# Patient Record
Sex: MALE | Race: WHITE | HISPANIC OR LATINO | Employment: OTHER | URBAN - METROPOLITAN AREA
[De-identification: names, ages, dates, MRNs, and addresses within clinical notes are randomized per-mention and may not be internally consistent; named-entity substitution may affect disease eponyms.]

---

## 2021-12-21 ENCOUNTER — HOSPITAL ENCOUNTER (OUTPATIENT)
Facility: HOSPITAL | Age: 36
Discharge: HOME | End: 2021-12-23
Attending: FAMILY MEDICINE | Admitting: INTERNAL MEDICINE
Payer: MEDICAID

## 2021-12-21 DIAGNOSIS — I10 ACCELERATED HYPERTENSION: ICD-10-CM

## 2021-12-21 DIAGNOSIS — I21.4 NSTEMI (NON-ST ELEVATED MYOCARDIAL INFARCTION): ICD-10-CM

## 2021-12-21 DIAGNOSIS — R79.89 ELEVATED TROPONIN: Primary | ICD-10-CM

## 2021-12-21 DIAGNOSIS — I10 HTN (HYPERTENSION): ICD-10-CM

## 2021-12-21 LAB
ALBUMIN SERPL BCP-MCNC: 4.5 G/DL (ref 3.5–5.2)
ALP SERPL-CCNC: 94 U/L (ref 55–135)
ALT SERPL W/O P-5'-P-CCNC: 40 U/L (ref 10–44)
ANION GAP SERPL CALC-SCNC: 11 MMOL/L (ref 8–16)
AST SERPL-CCNC: 44 U/L (ref 10–40)
BASOPHILS # BLD AUTO: 0.04 K/UL (ref 0–0.2)
BASOPHILS NFR BLD: 0.4 % (ref 0–1.9)
BILIRUB SERPL-MCNC: 0.4 MG/DL (ref 0.1–1)
BUN SERPL-MCNC: 14 MG/DL (ref 6–20)
CALCIUM SERPL-MCNC: 9 MG/DL (ref 8.7–10.5)
CHLORIDE SERPL-SCNC: 102 MMOL/L (ref 95–110)
CO2 SERPL-SCNC: 24 MMOL/L (ref 23–29)
CREAT SERPL-MCNC: 0.9 MG/DL (ref 0.5–1.4)
CTP QC/QA: YES
CTP QC/QA: YES
DIFFERENTIAL METHOD: ABNORMAL
EOSINOPHIL # BLD AUTO: 0.1 K/UL (ref 0–0.5)
EOSINOPHIL NFR BLD: 1 % (ref 0–8)
ERYTHROCYTE [DISTWIDTH] IN BLOOD BY AUTOMATED COUNT: 13.1 % (ref 11.5–14.5)
EST. GFR  (AFRICAN AMERICAN): >60 ML/MIN/1.73 M^2
EST. GFR  (NON AFRICAN AMERICAN): >60 ML/MIN/1.73 M^2
GLUCOSE SERPL-MCNC: 91 MG/DL (ref 70–110)
GROUP A STREP, MOLECULAR: NEGATIVE
HCT VFR BLD AUTO: 47.6 % (ref 40–54)
HGB BLD-MCNC: 16.3 G/DL (ref 14–18)
IMM GRANULOCYTES # BLD AUTO: 0.05 K/UL (ref 0–0.04)
IMM GRANULOCYTES NFR BLD AUTO: 0.5 % (ref 0–0.5)
LYMPHOCYTES # BLD AUTO: 2.1 K/UL (ref 1–4.8)
LYMPHOCYTES NFR BLD: 19.1 % (ref 18–48)
MCH RBC QN AUTO: 29.8 PG (ref 27–31)
MCHC RBC AUTO-ENTMCNC: 34.2 G/DL (ref 32–36)
MCV RBC AUTO: 87 FL (ref 82–98)
MONOCYTES # BLD AUTO: 0.9 K/UL (ref 0.3–1)
MONOCYTES NFR BLD: 8.4 % (ref 4–15)
NEUTROPHILS # BLD AUTO: 7.8 K/UL (ref 1.8–7.7)
NEUTROPHILS NFR BLD: 70.6 % (ref 38–73)
NRBC BLD-RTO: 0 /100 WBC
PLATELET # BLD AUTO: 271 K/UL (ref 150–450)
PMV BLD AUTO: 9.8 FL (ref 9.2–12.9)
POC MOLECULAR INFLUENZA A AGN: NEGATIVE
POC MOLECULAR INFLUENZA B AGN: NEGATIVE
POTASSIUM SERPL-SCNC: 4 MMOL/L (ref 3.5–5.1)
PROT SERPL-MCNC: 8.2 G/DL (ref 6–8.4)
RBC # BLD AUTO: 5.47 M/UL (ref 4.6–6.2)
SARS-COV-2 RDRP RESP QL NAA+PROBE: NEGATIVE
SODIUM SERPL-SCNC: 137 MMOL/L (ref 136–145)
TROPONIN I SERPL DL<=0.01 NG/ML-MCNC: 0.05 NG/ML (ref 0–0.03)
WBC # BLD AUTO: 11.08 K/UL (ref 3.9–12.7)

## 2021-12-21 PROCEDURE — 99291 CRITICAL CARE FIRST HOUR: CPT | Mod: 25

## 2021-12-21 PROCEDURE — 84484 ASSAY OF TROPONIN QUANT: CPT | Performed by: FAMILY MEDICINE

## 2021-12-21 PROCEDURE — G0378 HOSPITAL OBSERVATION PER HR: HCPCS

## 2021-12-21 PROCEDURE — 93010 EKG 12-LEAD: ICD-10-PCS | Mod: ,,, | Performed by: INTERNAL MEDICINE

## 2021-12-21 PROCEDURE — 25000003 PHARM REV CODE 250: Performed by: NURSE PRACTITIONER

## 2021-12-21 PROCEDURE — U0002 COVID-19 LAB TEST NON-CDC: HCPCS | Performed by: FAMILY MEDICINE

## 2021-12-21 PROCEDURE — 87651 STREP A DNA AMP PROBE: CPT | Performed by: FAMILY MEDICINE

## 2021-12-21 PROCEDURE — 93005 ELECTROCARDIOGRAM TRACING: CPT

## 2021-12-21 PROCEDURE — 25000003 PHARM REV CODE 250: Performed by: FAMILY MEDICINE

## 2021-12-21 PROCEDURE — 80053 COMPREHEN METABOLIC PANEL: CPT | Performed by: FAMILY MEDICINE

## 2021-12-21 PROCEDURE — 93010 ELECTROCARDIOGRAM REPORT: CPT | Mod: ,,, | Performed by: INTERNAL MEDICINE

## 2021-12-21 PROCEDURE — 85025 COMPLETE CBC W/AUTO DIFF WBC: CPT | Performed by: FAMILY MEDICINE

## 2021-12-21 RX ORDER — ASPIRIN 81 MG/1
81 TABLET ORAL DAILY
Status: DISCONTINUED | OUTPATIENT
Start: 2021-12-22 | End: 2021-12-23 | Stop reason: HOSPADM

## 2021-12-21 RX ORDER — ACETAMINOPHEN 325 MG/1
650 TABLET ORAL EVERY 6 HOURS PRN
Status: DISCONTINUED | OUTPATIENT
Start: 2021-12-21 | End: 2021-12-23 | Stop reason: HOSPADM

## 2021-12-21 RX ORDER — ONDANSETRON 4 MG/1
4 TABLET, ORALLY DISINTEGRATING ORAL EVERY 6 HOURS PRN
Status: DISCONTINUED | OUTPATIENT
Start: 2021-12-21 | End: 2021-12-23 | Stop reason: HOSPADM

## 2021-12-21 RX ORDER — DIPHENHYDRAMINE HCL 25 MG
25 CAPSULE ORAL EVERY 6 HOURS PRN
Status: DISCONTINUED | OUTPATIENT
Start: 2021-12-21 | End: 2021-12-23 | Stop reason: HOSPADM

## 2021-12-21 RX ORDER — ENOXAPARIN SODIUM 100 MG/ML
40 INJECTION SUBCUTANEOUS EVERY 24 HOURS
Status: DISCONTINUED | OUTPATIENT
Start: 2021-12-22 | End: 2021-12-23 | Stop reason: HOSPADM

## 2021-12-21 RX ORDER — CLONIDINE HYDROCHLORIDE 0.2 MG/1
0.2 TABLET ORAL
Status: COMPLETED | OUTPATIENT
Start: 2021-12-21 | End: 2021-12-21

## 2021-12-21 RX ORDER — ASPIRIN 325 MG
325 TABLET, DELAYED RELEASE (ENTERIC COATED) ORAL ONCE
Status: COMPLETED | OUTPATIENT
Start: 2021-12-21 | End: 2021-12-21

## 2021-12-21 RX ORDER — NITROGLYCERIN 0.4 MG/1
0.4 TABLET SUBLINGUAL EVERY 5 MIN PRN
Status: DISCONTINUED | OUTPATIENT
Start: 2021-12-21 | End: 2021-12-23 | Stop reason: HOSPADM

## 2021-12-21 RX ORDER — CLONIDINE HYDROCHLORIDE 0.1 MG/1
0.1 TABLET ORAL EVERY 6 HOURS PRN
Status: DISCONTINUED | OUTPATIENT
Start: 2021-12-21 | End: 2021-12-23 | Stop reason: HOSPADM

## 2021-12-21 RX ADMIN — CLONIDINE HYDROCHLORIDE 0.1 MG: 0.1 TABLET ORAL at 11:12

## 2021-12-21 RX ADMIN — NITROGLYCERIN 1 INCH: 20 OINTMENT TOPICAL at 09:12

## 2021-12-21 RX ADMIN — ASPIRIN 325 MG: 325 TABLET, COATED ORAL at 11:12

## 2021-12-21 RX ADMIN — CLONIDINE HYDROCHLORIDE 0.2 MG: 0.2 TABLET ORAL at 08:12

## 2021-12-22 LAB
CHOLEST SERPL-MCNC: 204 MG/DL (ref 120–199)
CHOLEST/HDLC SERPL: 5.5 {RATIO} (ref 2–5)
HDLC SERPL-MCNC: 37 MG/DL (ref 40–75)
HDLC SERPL: 18.1 % (ref 20–50)
LDLC SERPL CALC-MCNC: 148.8 MG/DL (ref 63–159)
NONHDLC SERPL-MCNC: 167 MG/DL
TRIGL SERPL-MCNC: 91 MG/DL (ref 30–150)
TROPONIN I SERPL DL<=0.01 NG/ML-MCNC: 0.04 NG/ML (ref 0–0.03)
TROPONIN I SERPL DL<=0.01 NG/ML-MCNC: 0.04 NG/ML (ref 0–0.03)

## 2021-12-22 PROCEDURE — 80061 LIPID PANEL: CPT | Performed by: NURSE PRACTITIONER

## 2021-12-22 PROCEDURE — G0378 HOSPITAL OBSERVATION PER HR: HCPCS

## 2021-12-22 PROCEDURE — 36415 COLL VENOUS BLD VENIPUNCTURE: CPT | Performed by: NURSE PRACTITIONER

## 2021-12-22 PROCEDURE — 25000003 PHARM REV CODE 250: Performed by: INTERNAL MEDICINE

## 2021-12-22 PROCEDURE — 63600175 PHARM REV CODE 636 W HCPCS: Performed by: NURSE PRACTITIONER

## 2021-12-22 PROCEDURE — 25000003 PHARM REV CODE 250: Performed by: NURSE PRACTITIONER

## 2021-12-22 PROCEDURE — 84484 ASSAY OF TROPONIN QUANT: CPT | Mod: 91 | Performed by: NURSE PRACTITIONER

## 2021-12-22 PROCEDURE — 99220 PR INITIAL OBSERVATION CARE,LEVL III: ICD-10-PCS | Mod: ,,, | Performed by: INTERNAL MEDICINE

## 2021-12-22 PROCEDURE — 99220 PR INITIAL OBSERVATION CARE,LEVL III: CPT | Mod: ,,, | Performed by: INTERNAL MEDICINE

## 2021-12-22 PROCEDURE — 96372 THER/PROPH/DIAG INJ SC/IM: CPT

## 2021-12-22 RX ORDER — HYDROCHLOROTHIAZIDE 25 MG/1
25 TABLET ORAL DAILY
Status: DISCONTINUED | OUTPATIENT
Start: 2021-12-22 | End: 2021-12-23 | Stop reason: HOSPADM

## 2021-12-22 RX ORDER — LOSARTAN POTASSIUM 50 MG/1
50 TABLET ORAL DAILY
Status: DISCONTINUED | OUTPATIENT
Start: 2021-12-22 | End: 2021-12-23 | Stop reason: HOSPADM

## 2021-12-22 RX ORDER — AMLODIPINE BESYLATE 10 MG/1
10 TABLET ORAL DAILY
Status: DISCONTINUED | OUTPATIENT
Start: 2021-12-22 | End: 2021-12-23 | Stop reason: HOSPADM

## 2021-12-22 RX ORDER — AMLODIPINE BESYLATE 10 MG/1
10 TABLET ORAL DAILY
Status: DISCONTINUED | OUTPATIENT
Start: 2021-12-22 | End: 2021-12-22

## 2021-12-22 RX ADMIN — ENOXAPARIN SODIUM 40 MG: 40 INJECTION SUBCUTANEOUS at 05:12

## 2021-12-22 RX ADMIN — ACETAMINOPHEN 650 MG: 325 TABLET ORAL at 07:12

## 2021-12-22 RX ADMIN — ASPIRIN 81 MG: 81 TABLET, COATED ORAL at 09:12

## 2021-12-22 RX ADMIN — LOSARTAN POTASSIUM 50 MG: 50 TABLET, FILM COATED ORAL at 06:12

## 2021-12-22 RX ADMIN — HYDROCHLOROTHIAZIDE 25 MG: 25 TABLET ORAL at 06:12

## 2021-12-22 RX ADMIN — ACETAMINOPHEN 650 MG: 325 TABLET ORAL at 02:12

## 2021-12-22 RX ADMIN — AMLODIPINE BESYLATE 10 MG: 10 TABLET ORAL at 05:12

## 2021-12-23 VITALS
WEIGHT: 216 LBS | BODY MASS INDEX: 35.99 KG/M2 | TEMPERATURE: 98 F | HEIGHT: 65 IN | SYSTOLIC BLOOD PRESSURE: 164 MMHG | RESPIRATION RATE: 20 BRPM | DIASTOLIC BLOOD PRESSURE: 95 MMHG | OXYGEN SATURATION: 95 % | HEART RATE: 72 BPM

## 2021-12-23 PROBLEM — I10 ACCELERATED HYPERTENSION: Status: RESOLVED | Noted: 2021-12-21 | Resolved: 2021-12-23

## 2021-12-23 PROBLEM — R79.89 ELEVATED TROPONIN: Status: RESOLVED | Noted: 2021-12-21 | Resolved: 2021-12-23

## 2021-12-23 LAB
AORTIC ROOT ANNULUS: 4.12 CM
ASCENDING AORTA: 3.91 CM
AV INDEX (PROSTH): 0.65
AV MEAN GRADIENT: 9 MMHG
AV PEAK GRADIENT: 15 MMHG
AV REGURGITATION PRESSURE HALF TIME: 659.11 MS
AV VALVE AREA: 2.37 CM2
AV VELOCITY RATIO: 0.6
BSA FOR ECHO PROCEDURE: 2.12 M2
CV ECHO LV RWT: 0.64 CM
DOP CALC AO PEAK VEL: 1.94 M/S
DOP CALC AO VTI: 33.8 CM
DOP CALC LVOT AREA: 3.6 CM2
DOP CALC LVOT DIAMETER: 2.15 CM
DOP CALC LVOT PEAK VEL: 1.17 M/S
DOP CALC LVOT STROKE VOLUME: 80.19 CM3
DOP CALC RVOT PEAK VEL: 0.93 M/S
DOP CALC RVOT VTI: 18.5 CM
DOP CALCLVOT PEAK VEL VTI: 22.1 CM
E WAVE DECELERATION TIME: 262.59 MSEC
E/A RATIO: 1.1
E/E' RATIO: 15.08 M/S
ECHO EF ESTIMATED: 31 %
ECHO LV POSTERIOR WALL: 1.71 CM (ref 0.6–1.1)
EJECTION FRACTION: 50 %
FRACTIONAL SHORTENING: 15 % (ref 28–44)
INTERVENTRICULAR SEPTUM: 1.57 CM (ref 0.6–1.1)
IVC DIAMETER: 1.81 CM
IVRT: 94.2 MSEC
LA MAJOR: 5.97 CM
LA MINOR: 3.67 CM
LA WIDTH: 3.26 CM
LEFT ATRIUM SIZE: 4.2 CM
LEFT ATRIUM VOLUME INDEX: 25.9 ML/M2
LEFT ATRIUM VOLUME: 52.9 CM3
LEFT INTERNAL DIMENSION IN SYSTOLE: 4.56 CM (ref 2.1–4)
LEFT VENTRICLE DIASTOLIC VOLUME INDEX: 68.15 ML/M2
LEFT VENTRICLE DIASTOLIC VOLUME: 139.03 ML
LEFT VENTRICLE MASS INDEX: 201 G/M2
LEFT VENTRICLE SYSTOLIC VOLUME INDEX: 46.9 ML/M2
LEFT VENTRICLE SYSTOLIC VOLUME: 95.59 ML
LEFT VENTRICULAR INTERNAL DIMENSION IN DIASTOLE: 5.36 CM (ref 3.5–6)
LEFT VENTRICULAR MASS: 409.1 G
LV LATERAL E/E' RATIO: 14 M/S
LV SEPTAL E/E' RATIO: 16.33 M/S
LVOT MG: 3.17 MMHG
LVOT MV: 0.85 CM/S
MV PEAK A VEL: 0.89 M/S
MV PEAK E VEL: 0.98 M/S
MV STENOSIS PRESSURE HALF TIME: 76.15 MS
MV VALVE AREA P 1/2 METHOD: 2.89 CM2
PISA AR MAX VEL: 5.95 M/S
PISA TR MAX VEL: 3.53 M/S
PV MEAN GRADIENT: 1.84 MMHG
RA MAJOR: 4.12 CM
RA PRESSURE: 3 MMHG
RA WIDTH: 3 CM
SINUS: 4.26 CM
STJ: 3.87 CM
TDI LATERAL: 0.07 M/S
TDI SEPTAL: 0.06 M/S
TDI: 0.07 M/S
TR MAX PG: 50 MMHG
TRICUSPID ANNULAR PLANE SYSTOLIC EXCURSION: 3.01 CM
TV REST PULMONARY ARTERY PRESSURE: 53 MMHG

## 2021-12-23 PROCEDURE — 25000003 PHARM REV CODE 250: Performed by: INTERNAL MEDICINE

## 2021-12-23 PROCEDURE — 99224 PR SUBSEQUENT OBSERVATION CARE,LEVEL I: ICD-10-PCS | Mod: 25,,, | Performed by: INTERNAL MEDICINE

## 2021-12-23 PROCEDURE — 99224 PR SUBSEQUENT OBSERVATION CARE,LEVEL I: CPT | Mod: 25,,, | Performed by: INTERNAL MEDICINE

## 2021-12-23 PROCEDURE — 25000003 PHARM REV CODE 250: Performed by: NURSE PRACTITIONER

## 2021-12-23 PROCEDURE — G0378 HOSPITAL OBSERVATION PER HR: HCPCS

## 2021-12-23 RX ORDER — AMLODIPINE BESYLATE 10 MG/1
10 TABLET ORAL DAILY
Qty: 30 TABLET | Refills: 1 | Status: SHIPPED | OUTPATIENT
Start: 2021-12-24 | End: 2022-12-24

## 2021-12-23 RX ORDER — LOSARTAN POTASSIUM 50 MG/1
100 TABLET ORAL DAILY
Qty: 30 TABLET | Refills: 1 | Status: SHIPPED | OUTPATIENT
Start: 2021-12-24 | End: 2022-12-24

## 2021-12-23 RX ORDER — ASPIRIN 81 MG/1
81 TABLET ORAL DAILY
Qty: 30 TABLET | Refills: 1 | Status: SHIPPED | OUTPATIENT
Start: 2021-12-24 | End: 2022-12-24

## 2021-12-23 RX ORDER — HYDROCHLOROTHIAZIDE 25 MG/1
25 TABLET ORAL DAILY
Qty: 30 TABLET | Refills: 1 | Status: SHIPPED | OUTPATIENT
Start: 2021-12-24 | End: 2022-12-24

## 2021-12-23 RX ADMIN — LOSARTAN POTASSIUM 50 MG: 50 TABLET, FILM COATED ORAL at 09:12

## 2021-12-23 RX ADMIN — AMLODIPINE BESYLATE 10 MG: 10 TABLET ORAL at 09:12

## 2021-12-23 RX ADMIN — HYDROCHLOROTHIAZIDE 25 MG: 25 TABLET ORAL at 09:12

## 2021-12-23 RX ADMIN — ACETAMINOPHEN 650 MG: 325 TABLET ORAL at 06:12

## 2021-12-23 RX ADMIN — ASPIRIN 81 MG: 81 TABLET, COATED ORAL at 09:12

## 2022-01-26 ENCOUNTER — TELEPHONE (OUTPATIENT)
Dept: CARDIOLOGY | Facility: CLINIC | Age: 37
End: 2022-01-26

## 2022-01-26 NOTE — TELEPHONE ENCOUNTER
I attempted to contact the patient and left a voicemail for him to call back to reschedule his appointment.

## 2025-05-27 ENCOUNTER — HOSPITAL ENCOUNTER (INPATIENT)
Facility: HOSPITAL | Age: 40
LOS: 3 days | Discharge: HOME | DRG: 286 | End: 2025-05-30
Attending: EMERGENCY MEDICINE | Admitting: HOSPITALIST
Payer: MEDICAID

## 2025-05-27 ENCOUNTER — DOCUMENTATION ONLY (OUTPATIENT)
Dept: CARDIOLOGY | Facility: CLINIC | Age: 40
End: 2025-05-27
Payer: MEDICAID

## 2025-05-27 DIAGNOSIS — I16.0 HYPERTENSIVE URGENCY: ICD-10-CM

## 2025-05-27 DIAGNOSIS — I10 HYPERTENSION, UNSPECIFIED TYPE: ICD-10-CM

## 2025-05-27 DIAGNOSIS — I50.43 ACUTE ON CHRONIC COMBINED SYSTOLIC AND DIASTOLIC CONGESTIVE HEART FAILURE: ICD-10-CM

## 2025-05-27 DIAGNOSIS — R79.89 ELEVATED TROPONIN: ICD-10-CM

## 2025-05-27 DIAGNOSIS — I50.9 ACUTE ON CHRONIC CONGESTIVE HEART FAILURE, UNSPECIFIED HEART FAILURE TYPE: Primary | ICD-10-CM

## 2025-05-27 DIAGNOSIS — R06.02 SHORTNESS OF BREATH: ICD-10-CM

## 2025-05-27 DIAGNOSIS — E87.6 HYPOKALEMIA: ICD-10-CM

## 2025-05-27 DIAGNOSIS — R94.39 ABNORMAL STRESS TEST: ICD-10-CM

## 2025-05-27 DIAGNOSIS — I50.9 CHF EXACERBATION: ICD-10-CM

## 2025-05-27 LAB
ABSOLUTE EOSINOPHIL (OHS): 0.37 K/UL
ABSOLUTE MONOCYTE (OHS): 0.76 K/UL (ref 0.3–1)
ABSOLUTE NEUTROPHIL COUNT (OHS): 6.07 K/UL (ref 1.8–7.7)
ALBUMIN SERPL BCP-MCNC: 3.8 G/DL (ref 3.5–5.2)
ALP SERPL-CCNC: 87 UNIT/L (ref 40–150)
ALT SERPL W/O P-5'-P-CCNC: 30 UNIT/L (ref 10–44)
AMM URATE CRY UR QL COMP ASSIST: NORMAL
AMORPH CRY UR QL COMP ASSIST: NORMAL
AMPHET UR QL SCN: NEGATIVE
ANION GAP (OHS): 11 MMOL/L (ref 8–16)
ANION GAP (OHS): 12 MMOL/L (ref 8–16)
AORTIC ROOT ANNULUS: 4 CM
AORTIC SIZE INDEX: 1.9 CM/M2
APICAL FOUR CHAMBER EJECTION FRACTION: 43 %
ASCENDING AORTA: 3.9 CM
AST SERPL-CCNC: 26 UNIT/L (ref 11–45)
AV INDEX (PROSTH): 0.64
AV MEAN GRADIENT: 11 MMHG
AV PEAK GRADIENT: 16 MMHG
AV REGURGITATION PRESSURE HALF TIME: 454 MS
AV VALVE AREA BY VELOCITY RATIO: 2.7 CM²
AV VALVE AREA: 2.7 CM²
AV VELOCITY RATIO: 0.65
BACTERIA #/AREA URNS AUTO: NORMAL /HPF
BARBITURATE SCN PRESENT UR: NEGATIVE
BASOPHILS # BLD AUTO: 0.07 K/UL
BASOPHILS NFR BLD AUTO: 0.7 %
BENZODIAZ UR QL SCN: NEGATIVE
BILIRUB SERPL-MCNC: 0.6 MG/DL (ref 0.1–1)
BILIRUB UR QL STRIP.AUTO: NEGATIVE
BNP SERPL-MCNC: 236 PG/ML (ref 0–99)
BSA FOR ECHO PROCEDURE: 2.14 M2
BUN SERPL-MCNC: 13 MG/DL (ref 6–20)
BUN SERPL-MCNC: 13 MG/DL (ref 6–20)
CA CARBONATE CRY UR QL COMP ASSIST: NORMAL
CA PHOS CRY UR QL COMP ASSIST: NORMAL
CALCIUM SERPL-MCNC: 8.5 MG/DL (ref 8.7–10.5)
CALCIUM SERPL-MCNC: 8.7 MG/DL (ref 8.7–10.5)
CANNABINOIDS UR QL SCN: NEGATIVE
CAOX CRY UR QL COMP ASSIST: NORMAL
CHLORIDE SERPL-SCNC: 102 MMOL/L (ref 95–110)
CHLORIDE SERPL-SCNC: 103 MMOL/L (ref 95–110)
CLARITY UR: CLEAR
CO2 SERPL-SCNC: 23 MMOL/L (ref 23–29)
CO2 SERPL-SCNC: 25 MMOL/L (ref 23–29)
COCAINE UR QL SCN: NEGATIVE
COLOR UR AUTO: COLORLESS
CREAT SERPL-MCNC: 1 MG/DL (ref 0.5–1.4)
CREAT SERPL-MCNC: 1 MG/DL (ref 0.5–1.4)
CREAT UR-MCNC: 31.4 MG/DL (ref 23–375)
CV ECHO LV RWT: 0.62 CM
DOP CALC AO PEAK VEL: 2 M/S
DOP CALC AO VTI: 35.1 CM
DOP CALC LVOT AREA: 4.2 CM2
DOP CALC LVOT DIAMETER: 2.3 CM
DOP CALC LVOT PEAK VEL: 1.3 M/S
DOP CALC LVOT STROKE VOLUME: 93.4 CM3
DOP CALC MV VTI: 28.6 CM
DOP CALC RVOT PEAK VEL: 0.9 M/S
DOP CALC RVOT VTI: 14.7 CM
DOP CALCLVOT PEAK VEL VTI: 22.5 CM
E WAVE DECELERATION TIME: 142 MSEC
E/A RATIO: 0.88
E/E' RATIO: 25 M/S
EAG (OHS): 105 MG/DL (ref 68–131)
ECHO LV POSTERIOR WALL: 1.8 CM (ref 0.6–1.1)
EJECTION FRACTION: 45 %
EPITH CASTS #/AREA UR COMP ASSIST: 0 /LPF (ref ?–0)
ERYTHROCYTE [DISTWIDTH] IN BLOOD BY AUTOMATED COUNT: 13.6 % (ref 11.5–14.5)
FATTY CASTS UR QL COMP ASSIST: 0 /LPF (ref ?–0)
FRACTIONAL SHORTENING: 15.5 % (ref 28–44)
GFR SERPLBLD CREATININE-BSD FMLA CKD-EPI: >60 ML/MIN/1.73/M2
GFR SERPLBLD CREATININE-BSD FMLA CKD-EPI: >60 ML/MIN/1.73/M2
GLUCOSE SERPL-MCNC: 112 MG/DL (ref 70–110)
GLUCOSE SERPL-MCNC: 96 MG/DL (ref 70–110)
GLUCOSE UR QL STRIP: NEGATIVE
GRAN CASTS UR QL COMP ASSIST: 0 /LPF (ref ?–0)
HBA1C MFR BLD: 5.3 % (ref 4–5.6)
HCT VFR BLD AUTO: 45.8 % (ref 40–54)
HCV AB SERPL QL IA: NEGATIVE
HGB BLD-MCNC: 15.7 GM/DL (ref 14–18)
HGB UR QL STRIP: NEGATIVE
HIV 1+2 AB+HIV1 P24 AG SERPL QL IA: NEGATIVE
HOLD SPECIMEN: NORMAL
HYALINE CASTS UR QL AUTO: 1 /LPF (ref 0–1)
IMM GRANULOCYTES # BLD AUTO: 0.08 K/UL (ref 0–0.04)
IMM GRANULOCYTES NFR BLD AUTO: 0.8 % (ref 0–0.5)
INFLUENZA A MOLECULAR (OHS): NEGATIVE
INFLUENZA B MOLECULAR (OHS): NEGATIVE
INTERVENTRICULAR SEPTUM: 1.3 CM (ref 0.6–1.1)
IVC DIAMETER: 1.89 CM
IVRT: 86 MSEC
KETONES UR QL STRIP: NEGATIVE
LA MAJOR: 7.2 CM
LA MINOR: 6.9 CM
LA WIDTH: 4.2 CM
LACTATE SERPL-SCNC: 1.1 MMOL/L (ref 0.5–2.2)
LEFT ATRIUM AREA SYSTOLIC (APICAL 2 CHAMBER): 29.94 CM2
LEFT ATRIUM AREA SYSTOLIC (APICAL 4 CHAMBER): 23.4 CM2
LEFT ATRIUM SIZE: 4.3 CM
LEFT ATRIUM VOLUME INDEX MOD: 43 ML/M2
LEFT ATRIUM VOLUME INDEX: 53 ML/M2
LEFT ATRIUM VOLUME MOD: 88 ML
LEFT ATRIUM VOLUME: 108 CM3
LEFT INTERNAL DIMENSION IN SYSTOLE: 4.9 CM (ref 2.1–4)
LEFT VENTRICLE DIASTOLIC VOLUME INDEX: 81.07 ML/M2
LEFT VENTRICLE DIASTOLIC VOLUME: 167 ML
LEFT VENTRICLE END DIASTOLIC VOLUME APICAL 4 CHAMBER: 134.14 ML
LEFT VENTRICLE END SYSTOLIC VOLUME APICAL 2 CHAMBER: 114.04 ML
LEFT VENTRICLE END SYSTOLIC VOLUME APICAL 4 CHAMBER: 67.54 ML
LEFT VENTRICLE MASS INDEX: 206.2 G/M2
LEFT VENTRICLE SYSTOLIC VOLUME INDEX: 54.9 ML/M2
LEFT VENTRICLE SYSTOLIC VOLUME: 113 ML
LEFT VENTRICULAR INTERNAL DIMENSION IN DIASTOLE: 5.8 CM (ref 3.5–6)
LEFT VENTRICULAR MASS: 424.8 G
LEUKOCYTE ESTERASE UR QL STRIP: NEGATIVE
LV LATERAL E/E' RATIO: 33.3 M/S
LV SEPTAL E/E' RATIO: 20 M/S
LVED V (TEICH): 166.68 ML
LVES V (TEICH): 113.29 ML
LVOT MG: 3.84 MMHG
LVOT MV: 0.93 CM/S
LYMPHOCYTES # BLD AUTO: 3.08 K/UL (ref 1–4.8)
MAGNESIUM SERPL-MCNC: 1.8 MG/DL (ref 1.6–2.6)
MCH RBC QN AUTO: 30.1 PG (ref 27–31)
MCHC RBC AUTO-ENTMCNC: 34.3 G/DL (ref 32–36)
MCV RBC AUTO: 88 FL (ref 82–98)
METHADONE UR QL SCN: NEGATIVE
MICROSCOPIC COMMENT: NORMAL
MV MEAN GRADIENT: 4 MMHG
MV PEAK A VEL: 1.14 M/S
MV PEAK E VEL: 1 M/S
MV PEAK GRADIENT: 8 MMHG
MV STENOSIS PRESSURE HALF TIME: 42.72 MS
MV VALVE AREA BY CONTINUITY EQUATION: 3.27 CM2
MV VALVE AREA P 1/2 METHOD: 5.15 CM2
NITRITE UR QL STRIP: NEGATIVE
NON-SQ EPI CELLS #/AREA URNS AUTO: 0 /HPF
NUCLEATED RBC (/100WBC) (OHS): 0 /100 WBC
OHS CV RV/LV RATIO: 0.67 CM
OPIATES UR QL SCN: NEGATIVE
OTHER ELEMENTS URNS MICRO: NORMAL
PCP UR QL: NEGATIVE
PH UR STRIP: 7 [PH]
PISA AR MAX VEL: 5.37 M/S
PISA TR MAX VEL: 2.8 M/S
PLATELET # BLD AUTO: 233 K/UL (ref 150–450)
PMV BLD AUTO: 9.8 FL (ref 9.2–12.9)
POTASSIUM SERPL-SCNC: 2.9 MMOL/L (ref 3.5–5.1)
POTASSIUM SERPL-SCNC: 3.3 MMOL/L (ref 3.5–5.1)
PROT SERPL-MCNC: 7.2 GM/DL (ref 6–8.4)
PROT UR QL STRIP: ABNORMAL
PV MEAN GRADIENT: 2 MMHG
PV MV: 0.92 M/S
PV PEAK GRADIENT: 9 MMHG
PV PEAK VELOCITY: 1.49 M/S
RA MAJOR: 6.52 CM
RA PRESSURE ESTIMATED: 3 MMHG
RA WIDTH: 4.5 CM
RBC # BLD AUTO: 5.21 M/UL (ref 4.6–6.2)
RBC #/AREA URNS AUTO: 0 /HPF (ref 0–4)
RBC CASTS UR QL COMP ASSIST: 0 /LPF (ref ?–0)
RELATIVE EOSINOPHIL (OHS): 3.5 %
RELATIVE LYMPHOCYTE (OHS): 29.5 % (ref 18–48)
RELATIVE MONOCYTE (OHS): 7.3 % (ref 4–15)
RELATIVE NEUTROPHIL (OHS): 58.2 % (ref 38–73)
RIGHT VENTRICLE DIASTOLIC BASEL DIMENSION: 3.9 CM
RV TB RVSP: 6 MMHG
SARS-COV-2 RDRP RESP QL NAA+PROBE: NEGATIVE
SODIUM SERPL-SCNC: 138 MMOL/L (ref 136–145)
SODIUM SERPL-SCNC: 138 MMOL/L (ref 136–145)
SP GR UR STRIP: 1.01
SQUAMOUS #/AREA URNS AUTO: 0 /HPF
STJ: 2.6 CM
T4 FREE SERPL-MCNC: 0.99 NG/DL (ref 0.71–1.51)
TDI LATERAL: 0.03 M/S
TDI SEPTAL: 0.05 M/S
TDI: 0.04 M/S
TR MAX PG: 30 MMHG
TRI-PHOS CRY UR QL COMP ASSIST: NORMAL
TRICHOMONAS UR QL COMP ASSIST: NORMAL /HPF
TRICUSPID ANNULAR PLANE SYSTOLIC EXCURSION: 2.9 CM
TROPONIN I SERPL DL<=0.01 NG/ML-MCNC: 0.11 NG/ML
TROPONIN I SERPL DL<=0.01 NG/ML-MCNC: 0.14 NG/ML
TSH SERPL-ACNC: 4.24 UIU/ML (ref 0.4–4)
TV REST PULMONARY ARTERY PRESSURE: 34 MMHG
UNSPECIFIED CRY UR QL COMP ASSIST: NORMAL
URATE CRY UR QL COMP ASSIST: NORMAL
UROBILINOGEN UR STRIP-ACNC: NEGATIVE EU/DL
WAXY CASTS UR QL COMP ASSIST: 0 /LPF (ref ?–0)
WBC # BLD AUTO: 10.43 K/UL (ref 3.9–12.7)
WBC #/AREA URNS AUTO: 0 /HPF (ref 0–5)
WBC CASTS UR QL COMP ASSIST: 0 /LPF (ref ?–0)
WBC CLUMPS UR QL AUTO: NORMAL
YEAST UR QL AUTO: NORMAL /HPF
Z-SCORE OF LEFT VENTRICULAR DIMENSION IN END DIASTOLE: -0.7
Z-SCORE OF LEFT VENTRICULAR DIMENSION IN END SYSTOLE: 1.99

## 2025-05-27 PROCEDURE — 63600175 PHARM REV CODE 636 W HCPCS: Performed by: HOSPITALIST

## 2025-05-27 PROCEDURE — 80307 DRUG TEST PRSMV CHEM ANLYZR: CPT | Performed by: EMERGENCY MEDICINE

## 2025-05-27 PROCEDURE — 84484 ASSAY OF TROPONIN QUANT: CPT | Performed by: NURSE PRACTITIONER

## 2025-05-27 PROCEDURE — 87389 HIV-1 AG W/HIV-1&-2 AB AG IA: CPT | Performed by: EMERGENCY MEDICINE

## 2025-05-27 PROCEDURE — 94761 N-INVAS EAR/PLS OXIMETRY MLT: CPT

## 2025-05-27 PROCEDURE — 25000003 PHARM REV CODE 250: Performed by: HOSPITALIST

## 2025-05-27 PROCEDURE — 81001 URINALYSIS AUTO W/SCOPE: CPT | Performed by: EMERGENCY MEDICINE

## 2025-05-27 PROCEDURE — 96376 TX/PRO/DX INJ SAME DRUG ADON: CPT

## 2025-05-27 PROCEDURE — 93005 ELECTROCARDIOGRAM TRACING: CPT

## 2025-05-27 PROCEDURE — 25000242 PHARM REV CODE 250 ALT 637 W/ HCPCS: Performed by: EMERGENCY MEDICINE

## 2025-05-27 PROCEDURE — 21400001 HC TELEMETRY ROOM

## 2025-05-27 PROCEDURE — 63600175 PHARM REV CODE 636 W HCPCS: Performed by: PHYSICIAN ASSISTANT

## 2025-05-27 PROCEDURE — 25000003 PHARM REV CODE 250: Performed by: PHYSICIAN ASSISTANT

## 2025-05-27 PROCEDURE — 80051 ELECTROLYTE PANEL: CPT | Performed by: EMERGENCY MEDICINE

## 2025-05-27 PROCEDURE — 99223 1ST HOSP IP/OBS HIGH 75: CPT | Mod: 25,,, | Performed by: INTERNAL MEDICINE

## 2025-05-27 PROCEDURE — 36415 COLL VENOUS BLD VENIPUNCTURE: CPT | Performed by: NURSE PRACTITIONER

## 2025-05-27 PROCEDURE — 99900035 HC TECH TIME PER 15 MIN (STAT)

## 2025-05-27 PROCEDURE — 63600175 PHARM REV CODE 636 W HCPCS: Performed by: EMERGENCY MEDICINE

## 2025-05-27 PROCEDURE — 87502 INFLUENZA DNA AMP PROBE: CPT | Performed by: EMERGENCY MEDICINE

## 2025-05-27 PROCEDURE — 85025 COMPLETE CBC W/AUTO DIFF WBC: CPT | Performed by: EMERGENCY MEDICINE

## 2025-05-27 PROCEDURE — 84484 ASSAY OF TROPONIN QUANT: CPT | Performed by: EMERGENCY MEDICINE

## 2025-05-27 PROCEDURE — 83605 ASSAY OF LACTIC ACID: CPT | Performed by: EMERGENCY MEDICINE

## 2025-05-27 PROCEDURE — 25000003 PHARM REV CODE 250: Performed by: EMERGENCY MEDICINE

## 2025-05-27 PROCEDURE — 96375 TX/PRO/DX INJ NEW DRUG ADDON: CPT

## 2025-05-27 PROCEDURE — 87040 BLOOD CULTURE FOR BACTERIA: CPT | Performed by: EMERGENCY MEDICINE

## 2025-05-27 PROCEDURE — 83036 HEMOGLOBIN GLYCOSYLATED A1C: CPT | Performed by: EMERGENCY MEDICINE

## 2025-05-27 PROCEDURE — 99291 CRITICAL CARE FIRST HOUR: CPT

## 2025-05-27 PROCEDURE — 96374 THER/PROPH/DIAG INJ IV PUSH: CPT

## 2025-05-27 PROCEDURE — U0002 COVID-19 LAB TEST NON-CDC: HCPCS | Performed by: EMERGENCY MEDICINE

## 2025-05-27 PROCEDURE — 84439 ASSAY OF FREE THYROXINE: CPT | Performed by: EMERGENCY MEDICINE

## 2025-05-27 PROCEDURE — 86803 HEPATITIS C AB TEST: CPT | Performed by: EMERGENCY MEDICINE

## 2025-05-27 PROCEDURE — 83735 ASSAY OF MAGNESIUM: CPT | Performed by: EMERGENCY MEDICINE

## 2025-05-27 PROCEDURE — 93010 ELECTROCARDIOGRAM REPORT: CPT | Mod: ,,, | Performed by: INTERNAL MEDICINE

## 2025-05-27 PROCEDURE — 83880 ASSAY OF NATRIURETIC PEPTIDE: CPT | Performed by: EMERGENCY MEDICINE

## 2025-05-27 PROCEDURE — 27000221 HC OXYGEN, UP TO 24 HOURS

## 2025-05-27 RX ORDER — PROMETHAZINE HYDROCHLORIDE 25 MG/1
25 TABLET ORAL EVERY 6 HOURS PRN
Status: DISCONTINUED | OUTPATIENT
Start: 2025-05-27 | End: 2025-05-30 | Stop reason: HOSPADM

## 2025-05-27 RX ORDER — ASPIRIN 325 MG
325 TABLET ORAL
Status: COMPLETED | OUTPATIENT
Start: 2025-05-27 | End: 2025-05-27

## 2025-05-27 RX ORDER — LOSARTAN POTASSIUM 50 MG/1
100 TABLET ORAL DAILY
Status: DISCONTINUED | OUTPATIENT
Start: 2025-05-27 | End: 2025-05-30 | Stop reason: HOSPADM

## 2025-05-27 RX ORDER — HYDRALAZINE HYDROCHLORIDE 20 MG/ML
20 INJECTION INTRAMUSCULAR; INTRAVENOUS
Status: COMPLETED | OUTPATIENT
Start: 2025-05-27 | End: 2025-05-27

## 2025-05-27 RX ORDER — ONDANSETRON 8 MG/1
8 TABLET, ORALLY DISINTEGRATING ORAL EVERY 8 HOURS PRN
Status: DISCONTINUED | OUTPATIENT
Start: 2025-05-27 | End: 2025-05-30 | Stop reason: HOSPADM

## 2025-05-27 RX ORDER — HYDRALAZINE HYDROCHLORIDE 20 MG/ML
10 INJECTION INTRAMUSCULAR; INTRAVENOUS
Status: COMPLETED | OUTPATIENT
Start: 2025-05-27 | End: 2025-05-27

## 2025-05-27 RX ORDER — NITROGLYCERIN 0.4 MG/1
0.4 TABLET SUBLINGUAL EVERY 5 MIN PRN
Status: DISCONTINUED | OUTPATIENT
Start: 2025-05-27 | End: 2025-05-30 | Stop reason: HOSPADM

## 2025-05-27 RX ORDER — SODIUM CHLORIDE 0.9 % (FLUSH) 0.9 %
10 SYRINGE (ML) INJECTION
Status: DISCONTINUED | OUTPATIENT
Start: 2025-05-27 | End: 2025-05-30 | Stop reason: HOSPADM

## 2025-05-27 RX ORDER — ACETAMINOPHEN 325 MG/1
650 TABLET ORAL
Status: COMPLETED | OUTPATIENT
Start: 2025-05-27 | End: 2025-05-27

## 2025-05-27 RX ORDER — ACETAMINOPHEN 325 MG/1
650 TABLET ORAL EVERY 4 HOURS PRN
Status: DISCONTINUED | OUTPATIENT
Start: 2025-05-27 | End: 2025-05-29 | Stop reason: SDUPTHER

## 2025-05-27 RX ORDER — POLYETHYLENE GLYCOL 3350 17 G/17G
17 POWDER, FOR SOLUTION ORAL DAILY PRN
Status: DISCONTINUED | OUTPATIENT
Start: 2025-05-27 | End: 2025-05-30 | Stop reason: HOSPADM

## 2025-05-27 RX ORDER — ACETAMINOPHEN 325 MG/1
650 TABLET ORAL EVERY 8 HOURS PRN
Status: DISCONTINUED | OUTPATIENT
Start: 2025-05-27 | End: 2025-05-30 | Stop reason: HOSPADM

## 2025-05-27 RX ORDER — FUROSEMIDE 10 MG/ML
60 INJECTION INTRAMUSCULAR; INTRAVENOUS
Status: COMPLETED | OUTPATIENT
Start: 2025-05-27 | End: 2025-05-27

## 2025-05-27 RX ORDER — FUROSEMIDE 10 MG/ML
40 INJECTION INTRAMUSCULAR; INTRAVENOUS 2 TIMES DAILY
Status: DISCONTINUED | OUTPATIENT
Start: 2025-05-27 | End: 2025-05-28

## 2025-05-27 RX ORDER — ENOXAPARIN SODIUM 100 MG/ML
40 INJECTION SUBCUTANEOUS EVERY 24 HOURS
Status: DISCONTINUED | OUTPATIENT
Start: 2025-05-27 | End: 2025-05-30 | Stop reason: HOSPADM

## 2025-05-27 RX ADMIN — ACETAMINOPHEN 650 MG: 325 TABLET ORAL at 11:05

## 2025-05-27 RX ADMIN — ACETAMINOPHEN 650 MG: 325 TABLET ORAL at 03:05

## 2025-05-27 RX ADMIN — FUROSEMIDE 40 MG: 10 INJECTION, SOLUTION INTRAMUSCULAR; INTRAVENOUS at 01:05

## 2025-05-27 RX ADMIN — HYDRALAZINE HYDROCHLORIDE 10 MG: 20 INJECTION INTRAMUSCULAR; INTRAVENOUS at 03:05

## 2025-05-27 RX ADMIN — LOSARTAN POTASSIUM 100 MG: 50 TABLET, FILM COATED ORAL at 02:05

## 2025-05-27 RX ADMIN — FUROSEMIDE 40 MG: 10 INJECTION, SOLUTION INTRAMUSCULAR; INTRAVENOUS at 09:05

## 2025-05-27 RX ADMIN — HYDRALAZINE HYDROCHLORIDE 20 MG: 20 INJECTION INTRAMUSCULAR; INTRAVENOUS at 04:05

## 2025-05-27 RX ADMIN — FUROSEMIDE 60 MG: 10 INJECTION, SOLUTION INTRAMUSCULAR; INTRAVENOUS at 02:05

## 2025-05-27 RX ADMIN — ASPIRIN 325 MG: 325 TABLET ORAL at 04:05

## 2025-05-27 RX ADMIN — POTASSIUM BICARBONATE 50 MEQ: 978 TABLET, EFFERVESCENT ORAL at 03:05

## 2025-05-27 RX ADMIN — ENOXAPARIN SODIUM 40 MG: 40 INJECTION SUBCUTANEOUS at 05:05

## 2025-05-27 RX ADMIN — NITROGLYCERIN 0.4 MG: 0.4 TABLET SUBLINGUAL at 02:05

## 2025-05-27 NOTE — H&P
[facility] - Emergency Dept.  [facility]  History & Physical    Patient Name: Henry Harmon  MRN: [id number]  Patient Class: IP- Inpatient  Admission Date: 5/27/2025  Attending Physician: Darius Schuler MD  Primary Care Provider: Catie, Primary Doctor         Patient information was obtained from patient, past medical records, and ER records.     Subjective:     Principal Problem:CHF exacerbation    Chief Complaint:   Chief Complaint   Patient presents with    Shortness of Breath     Pt c/o SOB, L sided CP started yest & worsening today. +cough. Hx of HTN, not currently on meds        HPI: Henry Harmon is a 40 y.o. male with a PMH  has a past medical history of Hypertension.presented to the Emergency Department for evaluation of SOB which began 1 day ago. Pt reports L-sided chest pain and his sxs have worsened today that prompted his ER visit. Pt reports that the doctor he sees took him off of his BP medications years ago. Symptoms are constant and moderate in severity.  Patient reports his shortness of breath worsens with exertion and with lying flat.  Patient states he has been having asleep with 2 pillows in mostly in the upright position. Shortness of breath improves when not exerting himself.  Associated sxs include nonproductive cough.  Denies history of heart failure.  Patient denies any nausea. No prior Tx specified.  No further complaints or concerns at this time.      ER workup revealed CBC to be unremarkable.  CMP revealed potassium of 2.9.  .  Troponin 0.110.  Lactic acid normal.  TSH 4.238.  Free T4 0.99.  Urine and drug UA negative.  Flu and COVID negative.  Chest x-ray revealed cardiomegaly with evidence of bilateral pulmonary edema per wet read.  EKG revealed normal sinus rhythm with biatrial enlargement and right word axis with ventricular rate of 97 beats per minute and a QT/QTC of 386/490.  BP initially elevated to 233/149 in ER, but improved to of 132/70 in ER after  receiving 60 mg Lasix IV, 10 mg hydralazine IV, 20 mg hydralazine IV.  Patient also received 50 mEq potassium bicarbonate disintegrating tablet in addition to 325 mg aspirin 650 mg Tylenol in ER.  Hospital Medicine consulted to admit patient for further workup for CHF.  Patient in agreement with treatment plan.  Patient admitted under inpatient status.    PCP: No, Primary Doctor      Past Medical History:   Diagnosis Date    Hypertension        History reviewed. No pertinent surgical history.    Review of patient's allergies indicates:   Allergen Reactions    Penicillins Rash       No current facility-administered medications on file prior to encounter.     Current Outpatient Medications on File Prior to Encounter   Medication Sig    amLODIPine (NORVASC) 10 MG tablet Take 1 tablet (10 mg total) by mouth once daily.    aspirin (ECOTRIN) 81 MG EC tablet Take 1 tablet (81 mg total) by mouth once daily.    hydroCHLOROthiazide (HYDRODIURIL) 25 MG tablet Take 1 tablet (25 mg total) by mouth once daily.    losartan (COZAAR) 50 MG tablet Take 2 tablets (100 mg total) by mouth once daily.     Family History    None       Tobacco Use    Smoking status: Never    Smokeless tobacco: Never   Vaping Use    Vaping status: Never Used   Substance and Sexual Activity    Alcohol use: Not Currently     Comment: Occasional use    Drug use: Never    Sexual activity: Not on file     Review of Systems   Constitutional:  Negative for activity change, appetite change, chills, diaphoresis and fatigue.   Respiratory:  Positive for cough and shortness of breath.    Cardiovascular:  Positive for palpitations. Negative for chest pain and leg swelling.   Gastrointestinal:  Negative for abdominal pain, diarrhea, nausea and vomiting.   Genitourinary:  Negative for dysuria, flank pain and hematuria.   Neurological:  Positive for headaches. Negative for dizziness and light-headedness.   All other systems reviewed and are negative.    Objective:      Vital Signs (Most Recent):  Temp: 99.1 °F (37.3 °C) (05/27/25 0146)  Pulse: 97 (05/27/25 0600)  Resp: (!) 33 (05/27/25 0600)  BP: (!) 169/91 (05/27/25 0600)  SpO2: 100 % (05/27/25 0600) Vital Signs (24h Range):  Temp:  [99.1 °F (37.3 °C)] 99.1 °F (37.3 °C)  Pulse:  [71-97] 97  Resp:  [15-33] 33  SpO2:  [91 %-100 %] 100 %  BP: (132-233)/() 169/91     Weight: 100 kg (220 lb 7.4 oz)  Body mass index is 36.69 kg/m².     Physical Exam  Vitals and nursing note reviewed.   Constitutional:       General: He is awake. He is not in acute distress.     Appearance: Normal appearance. He is well-developed and well-groomed. He is not ill-appearing, toxic-appearing or diaphoretic.   HENT:      Head: Normocephalic and atraumatic.      Mouth/Throat:      Mouth: Mucous membranes are moist.      Pharynx: Oropharynx is clear.   Eyes:      Extraocular Movements: Extraocular movements intact.      Conjunctiva/sclera: Conjunctivae normal.      Pupils: Pupils are equal, round, and reactive to light.   Cardiovascular:      Rate and Rhythm: Normal rate and regular rhythm.      Pulses: Normal pulses.      Heart sounds: Normal heart sounds. No murmur heard.  Pulmonary:      Effort: Pulmonary effort is normal.      Breath sounds: Rales present.   Abdominal:      General: Bowel sounds are normal.      Palpations: Abdomen is soft.      Tenderness: There is no abdominal tenderness. There is no right CVA tenderness, left CVA tenderness, guarding or rebound.   Musculoskeletal:      Cervical back: Normal range of motion and neck supple.      Right lower leg: No edema.      Left lower leg: No edema.      Comments: 5/5 strength throughout   Skin:     General: Skin is warm and dry.      Capillary Refill: Capillary refill takes less than 2 seconds.   Neurological:      General: No focal deficit present.      Mental Status: He is alert and oriented to person, place, and time. Mental status is at baseline.      GCS: GCS eye subscore is 4. GCS  verbal subscore is 5. GCS motor subscore is 6.      Cranial Nerves: Cranial nerves 2-12 are intact.      Sensory: Sensation is intact.      Motor: Motor function is intact.      Deep Tendon Reflexes: Reflexes are normal and symmetric.   Psychiatric:         Mood and Affect: Mood normal.         Behavior: Behavior normal. Behavior is cooperative.              CRANIAL NERVES     CN III, IV, VI   Pupils are equal, round, and reactive to light.     LABS:  Recent Results (from the past 24 hours)   Hepatitis C Antibody    Collection Time: 05/27/25  2:05 AM   Result Value Ref Range    Hep C Ab Interp Negative Negative   HCV Virus Hold Specimen    Collection Time: 05/27/25  2:05 AM   Result Value Ref Range    Extra Tube Hold for add-ons.    HIV 1/2 Ag/Ab (4th Gen)    Collection Time: 05/27/25  2:05 AM   Result Value Ref Range    HIV 1/2 Ag/Ab Negative Negative   Comprehensive metabolic panel    Collection Time: 05/27/25  2:05 AM   Result Value Ref Range    Sodium 138 136 - 145 mmol/L    Potassium 2.9 (L) 3.5 - 5.1 mmol/L    Chloride 103 95 - 110 mmol/L    CO2 23 23 - 29 mmol/L    Glucose 96 70 - 110 mg/dL    BUN 13 6 - 20 mg/dL    Creatinine 1.0 0.5 - 1.4 mg/dL    Calcium 8.5 (L) 8.7 - 10.5 mg/dL    Protein Total 7.2 6.0 - 8.4 gm/dL    Albumin 3.8 3.5 - 5.2 g/dL    Bilirubin Total 0.6 0.1 - 1.0 mg/dL    ALP 87 40 - 150 unit/L    AST 26 11 - 45 unit/L    ALT 30 10 - 44 unit/L    Anion Gap 12 8 - 16 mmol/L    eGFR >60 >60 mL/min/1.73/m2   Troponin I    Collection Time: 05/27/25  2:05 AM   Result Value Ref Range    Troponin-I 0.110 (H) <=0.026 ng/mL   Brain natriuretic peptide    Collection Time: 05/27/25  2:05 AM   Result Value Ref Range     (H) 0 - 99 pg/mL   Lactic Acid, Plasma    Collection Time: 05/27/25  2:05 AM   Result Value Ref Range    Lactic Acid Level 1.1 0.5 - 2.2 mmol/L   CBC with Differential    Collection Time: 05/27/25  2:05 AM   Result Value Ref Range    WBC 10.43 3.90 - 12.70 K/uL    RBC 5.21 4.60 -  6.20 M/uL    HGB 15.7 14.0 - 18.0 gm/dL    HCT 45.8 40.0 - 54.0 %    MCV 88 82 - 98 fL    MCH 30.1 27.0 - 31.0 pg    MCHC 34.3 32.0 - 36.0 g/dL    RDW 13.6 11.5 - 14.5 %    Platelet Count 233 150 - 450 K/uL    MPV 9.8 9.2 - 12.9 fL    Nucleated RBC 0 <=0 /100 WBC    Neut % 58.2 38 - 73 %    Lymph % 29.5 18 - 48 %    Mono % 7.3 4 - 15 %    Eos % 3.5 <=8 %    Basophil % 0.7 <=1.9 %    Imm Grans % 0.8 (H) 0.0 - 0.5 %    Neut # 6.07 1.8 - 7.7 K/uL    Lymph # 3.08 1 - 4.8 K/uL    Mono # 0.76 0.3 - 1 K/uL    Eos # 0.37 <=0.5 K/uL    Baso # 0.07 <=0.2 K/uL    Imm Grans # 0.08 (H) 0.00 - 0.04 K/uL   TSH    Collection Time: 05/27/25  2:05 AM   Result Value Ref Range    TSH 4.238 (H) 0.400 - 4.000 uIU/mL    Free T4 0.99 0.71 - 1.51 ng/dL   Light Blue Top Hold    Collection Time: 05/27/25  2:05 AM   Result Value Ref Range    Extra Tube Hold for add-ons.    Light Green Top Hold    Collection Time: 05/27/25  2:05 AM   Result Value Ref Range    Extra Tube Hold for add-ons.    Magnesium    Collection Time: 05/27/25  2:05 AM   Result Value Ref Range    Magnesium  1.8 1.6 - 2.6 mg/dL   COVID-19 Rapid Screening    Collection Time: 05/27/25  2:12 AM   Result Value Ref Range    SARS COV-2 Molecular Negative Negative   Influenza A & B by Molecular    Collection Time: 05/27/25  2:12 AM    Specimen: Nasal Swab   Result Value Ref Range    INFLUENZA A MOLECULAR Negative Negative    INFLUENZA B MOLECULAR  Negative Negative   Drug screen panel, emergency    Collection Time: 05/27/25  2:45 AM   Result Value Ref Range    Benzodiazepine, Urine Negative Negative    Methadone, Urine Negative Negative    Cocaine, Urine Negative Negative    Opiates, Urine Negative Negative    Barbiturates, Urine Negative Negative    Amphetamines, Urine Negative Negative    THC Negative Negative    Phencyclidine, Urine Negative Negative    Urine Creatinine 31.4 23.0 - 375.0 mg/dL   Urinalysis, Reflex to Urine Culture Urine, Clean Catch    Collection Time: 05/27/25   2:45 AM    Specimen: Urine, Clean Catch   Result Value Ref Range    Color, UA Colorless (A) Straw, Roxy, Yellow, Light-Orange    Appearance, UA Clear Clear    pH, UA 7.0 5.0 - 8.0    Spec Grav UA 1.010 1.005 - 1.030    Protein, UA 1+ (A) Negative    Glucose, UA Negative Negative    Ketones, UA Negative Negative    Bilirubin, UA Negative Negative    Blood, UA Negative Negative    Nitrites, UA Negative Negative    Urobilinogen, UA Negative <2.0 EU/dL    Leukocyte Esterase, UA Negative Negative   GREY TOP URINE HOLD    Collection Time: 05/27/25  2:45 AM   Result Value Ref Range    Extra Tube Hold for add-ons.    Urinalysis Microscopic    Collection Time: 05/27/25  2:45 AM   Result Value Ref Range    RBC, UA 0 0 - 4 /HPF    WBC, UA 0 0 - 5 /HPF    WBC Clumps, UA None None, Rare    Bacteria, UA None None, Rare, Occasional /HPF    Yeast, UA None None /HPF    Squamous Epithelial Cells, UA 0 /HPF    Non-Squamous Epithelial Cells 0 <=0 /HPF    Hyaline Casts, UA 1 0 - 1 /LPF    Epithelial Casts 0 None /lpf    WBC Casts 0 None /LPF    RBC Casts 0 None /LPF    Granular Casts 0 None  /LPF    Fatty Casts, UA 0 None /LPF    Waxy Casts, UA 0 None /lpf    Triple Phosphate Crystals, UA None None, Rare, Occasional, Few, Moderate    Calcium Oxalate Crystals, UA None None, Rare, Occasional, Few, Moderate    Calcium Phosphate Crystal None Rare, None, Moderate, Few, Occasional    Calcium Carbonate Crystal None Occasional, Rare, Few, None, Moderate    Ammonium Urate Crystals None Moderate, Occasional, Rare, Few, None    Uric Acid Crystals, UA None None, Rare, Occasional, Few, Moderate    Amorphous, UA None None, Occasional, Few, Moderate, Rare    Unclassified Crystals None None, Rare, Occasional, Few, Moderate    Trichomonas, UA None None /HPF    Other, UA None None    Microscopic Comment         RADIOLOGY  No results found.    EKG    MICROBIOLOGY    MDM     Amount and/or Complexity of Data Reviewed  Clinical lab tests: reviewed  Tests in  the radiology section of CPT®: reviewed  Tests in the medicine section of CPT®: reviewed  Discussion of test results with the performing providers: yes  Decide to obtain previous medical records or to obtain history from someone other than the patient: yes  Obtain history from someone other than the patient: yes  Review and summarize past medical records: yes  Discuss the patient with other providers: yes  Independent visualization of images, tracings, or specimens: yes        Assessment/Plan:     Assessment & Plan  CHF exacerbation  Patient has unspecified heart failure that is Acute. On presentation their CHF was decompensated. Evidence of decompensated CHF on presentation includes: crackles on lung auscultation, orthopnea, paroxysmal nocturnal dyspnea (PND), dyspnea on exertion (ESTRADA), and shortness of breath. The etiology of their decompensation is likely dietary indiscretion and increased fluid intake. Most recent BNP and echo results are listed below.  Recent Labs     05/27/25  0205   *     Latest ECHO  Results for orders placed during the hospital encounter of 12/21/21    Echo    Interpretation Summary  · Concentric hypertrophy and low normal systolic function.  · The estimated ejection fraction is 50%.  · Grade II left ventricular diastolic dysfunction.  · With normal right ventricular systolic function.  · Normal central venous pressure (3 mmHg).  · The estimated PA systolic pressure is 53 mmHg.  · There is pulmonary hypertension.  · The sinuses of Valsalva is mildly dilated.    Current Heart Failure Medications       Plan  - Monitor strict I&Os and daily weights.    - Place on telemetry  - Low sodium diet  - Place on fluid restriction of 2 L.   - Cardiology has been consulted  - The patient's volume status is improving but not at their baseline as indicated by crackles on lung auscultation, orthopnea, paroxysmal nocturnal dyspnea (PND), dyspnea on exertion (ESTRADA), and shortness of breath  Hypertensive  urgency  Patient has a current diagnosis of hypertensive urgency (without evidence of end organ damage) which is controlled.  Latest blood pressure and vitals reviewed-   Temp:  [99.1 °F (37.3 °C)]   Pulse:  [71-97]   Resp:  [15-33]   BP: (132-233)/()   SpO2:  [91 %-100 %] .   Patient currently off IV antihypertensives.   Home meds for hypertension were reviewed and noted below.   Hypertension Medications              amLODIPine (NORVASC) 10 MG tablet Take 1 tablet (10 mg total) by mouth once daily.    hydroCHLOROthiazide (HYDRODIURIL) 25 MG tablet Take 1 tablet (25 mg total) by mouth once daily.    losartan (COZAAR) 50 MG tablet Take 2 tablets (100 mg total) by mouth once daily.            Medication adjustment for hospital antihypertensives is as follows- resume HTN meds and utilize prn hydralazine     Will aim for controlled BP reduction by medications noted above. Monitor and mitigate end organ damage as indicated.  Hypokalemia  Patient's most recent potassium results are listed below.   Recent Labs     05/27/25  0205   K 2.9*     Plan  - Replete potassium per protocol  - Monitor potassium Daily  - Patient's hypokalemia is being treated      VTE Risk Mitigation (From admission, onward)           Ordered     enoxaparin injection 40 mg  Daily         05/27/25 0449     IP VTE HIGH RISK PATIENT  Once         05/27/25 0449     Place sequential compression device  Until discontinued         05/27/25 0449                  //Core Measures   -DVT proph: SCDs, Lovenox  -Code status Full    -Surrogate:none present    Components of this note were documented using a voice recognition system and are subject to errors not corrected at the time the document was proof read. Please contact the author for any clarifications.       Wayne Schuler NP  Department of Hospital Medicine  [facility] - Emergency Dept.

## 2025-05-27 NOTE — HPI
Henry Harmon is a 40 y.o. male with a PMH  has a past medical history of Hypertension.presented to the Emergency Department for evaluation of SOB which began 1 day ago. Pt reports L-sided chest pain and his sxs have worsened today that prompted his ER visit. Pt reports that the doctor he sees took him off of his BP medications years ago. Symptoms are constant and moderate in severity.  Patient reports his shortness of breath worsens with exertion and with lying flat.  Patient states he has been having asleep with 2 pillows in mostly in the upright position. Shortness of breath improves when not exerting himself.  Associated sxs include nonproductive cough.  Denies history of heart failure.  Patient denies any nausea. No prior Tx specified.  No further complaints or concerns at this time.      ER workup revealed CBC to be unremarkable.  CMP revealed potassium of 2.9.  .  Troponin 0.110.  Lactic acid normal.  TSH 4.238.  Free T4 0.99.  Urine and drug UA negative.  Flu and COVID negative.  Chest x-ray revealed cardiomegaly with evidence of bilateral pulmonary edema per wet read.  EKG revealed normal sinus rhythm with biatrial enlargement and right word axis with ventricular rate of 97 beats per minute and a QT/QTC of 386/490.  BP initially elevated to 233/149 in ER, but improved to of 132/70 in ER after receiving 60 mg Lasix IV, 10 mg hydralazine IV, 20 mg hydralazine IV.  Patient also received 50 mEq potassium bicarbonate disintegrating tablet in addition to 325 mg aspirin 650 mg Tylenol in ER.  Hospital Medicine consulted to admit patient for further workup for CHF.  Patient in agreement with treatment plan.  Patient admitted under inpatient status.    PCP: No, Primary Doctor

## 2025-05-27 NOTE — ASSESSMENT & PLAN NOTE
-Presents with decompensated CHF in setting of uncontrolled HTN  -Continue IV diuresis  -ARB added  -Will plan to add Coreg tmw  -TTE pending  -Strict I's/O's  -Counseled on low salt diet

## 2025-05-27 NOTE — ED NOTES
Patient denies CP at this time. Pain to occipital region of head, denies change in vision. Dr Gallo notified. Remains Hypertensive. See new MD orders.

## 2025-05-27 NOTE — ASSESSMENT & PLAN NOTE
-Due to medication non-compliance  -ARB initiated  -Acosta   -Will plan to add BB tomorrow  -TTE pending

## 2025-05-27 NOTE — ASSESSMENT & PLAN NOTE
-Troponin 0.110>0.143, continue to trend  -Likely due to demand ischemia from uncontrolled HTN/CHF  -ASA  -ARB  -Statin if indicated  -BB tmw  -TTE pending; stress test IP vs OP

## 2025-05-27 NOTE — ASSESSMENT & PLAN NOTE
Patient has unspecified heart failure that is Acute. On presentation their CHF was decompensated. Evidence of decompensated CHF on presentation includes: crackles on lung auscultation, orthopnea, paroxysmal nocturnal dyspnea (PND), dyspnea on exertion (ESTRADA), and shortness of breath. The etiology of their decompensation is likely dietary indiscretion and increased fluid intake. Most recent BNP and echo results are listed below.  Recent Labs     05/27/25  0205   *     Latest ECHO  Results for orders placed during the hospital encounter of 12/21/21    Echo    Interpretation Summary  · Concentric hypertrophy and low normal systolic function.  · The estimated ejection fraction is 50%.  · Grade II left ventricular diastolic dysfunction.  · With normal right ventricular systolic function.  · Normal central venous pressure (3 mmHg).  · The estimated PA systolic pressure is 53 mmHg.  · There is pulmonary hypertension.  · The sinuses of Valsalva is mildly dilated.    Current Heart Failure Medications       Plan  - Monitor strict I&Os and daily weights.    - Place on telemetry  - Low sodium diet  - Place on fluid restriction of 2 L.   - Cardiology has been consulted  - The patient's volume status is improving but not at their baseline as indicated by crackles on lung auscultation, orthopnea, paroxysmal nocturnal dyspnea (PND), dyspnea on exertion (ESTRADA), and shortness of breath

## 2025-05-27 NOTE — Clinical Note
Consulted Filomena, Pharmacist. According to her, ancef is safe to administer despite PCN allergy.  HPI    SUBJECTIVE:   Nolberto Rivers is a 9 year old male who presents to clinic today for the following   health issues:    Acute Illness   Acute illness concerns: cough  Onset: 5 days ago     Fever: YES-subjective    Chills/Sweats: YES    Headache (location?): no    Sinus Pressure:no    Conjunctivitis:  YES- feel irritated, slightly  itchy    Ear Pain: no    Rhinorrhea: YES    Congestion: YES    Sore Throat: YES- postnasal drainage     Cough: YES-non-productive    Wheeze: YES- intermittent    Decreased Appetite: YES    Nausea: YES    Vomiting: YES- one episode of post-tussive emesis    Diarrhea:  no    Dysuria/Freq.: no    Fatigue/Achiness: YES    Sick/Strep Exposure:unsure     Therapies Tried and outcome: OTC cough suppressant    No eye pain, vision changes, or FB sensation/exposure.   Pt has hx seasonal allergies (fall & spring) & asthma associated with these allergies. Takes Singulair & Zyrtec as needed for this, as well as Pulmicort and albuterol nebs/albuterol inhaler PRN. Currently doesn't have any of these medications.    Chart Review:  No flowsheet data found.  No flowsheet data found.    Patient Active Problem List   Diagnosis     Childhood obesity     History reviewed. No pertinent surgical history.  Family History   Problem Relation Age of Onset     Family History Negative Mother      Family History Negative Father       Social History     Tobacco Use     Smoking status: Never Smoker     Smokeless tobacco: Never Used   Substance Use Topics     Alcohol use: No        Problem list, Medication list, Allergies, Medical/Social/Surg hx reviewed in [facility] eto, updated as appropriate.      Review of Systems   Constitutional: Positive for chills and fever.   HENT: Positive for congestion and sore throat.         Postnasal drip, rhinorrhea   Eyes: Negative for blurred vision, pain, discharge and redness.        Eye itching/irritation   Respiratory: Positive for cough, shortness of breath and wheezing.   "  Cardiovascular: Negative for chest pain.   Gastrointestinal: Negative for abdominal pain, diarrhea, nausea and vomiting (post-tussive).   Skin: Negative for rash.   Neurological: Negative for focal weakness and headaches.   All other systems reviewed and are negative.        Physical Exam   HENT:   Head: Normocephalic and atraumatic.   Right Ear: Tympanic membrane and external ear normal.   Left Ear: Tympanic membrane and external ear normal.   Nose: Nose normal.   Mouth/Throat: Mucous membranes are moist. Oropharynx is clear.   Eyes: Pupils are equal, round, and reactive to light. Conjunctivae and EOM are normal. No periorbital edema on the right side. No periorbital edema on the left side.   Cardiovascular: Normal rate and regular rhythm.   Pulmonary/Chest: Effort normal and breath sounds normal.   Musculoskeletal: Normal range of motion.   Neurological: He is alert.   Skin: Skin is warm and dry.   Nursing note and vitals reviewed.    Vital Signs  /63 (BP Location: Right arm, Patient Position: Chair, Cuff Size: Adult Regular)   Pulse 95   Temp 97.5  F (36.4  C) (Oral)   Ht 1.556 m (5' 1.25\")   Wt 67.7 kg (149 lb 3.2 oz)   SpO2 97%   BMI 27.96 kg/m     Body mass index is 27.96 kg/m .    Diagnostic Test Results:  none     ASSESSMENT/PLAN:                                                        ICD-10-CM    1. Moderate persistent asthma with allergic rhinitis without complication J45.40 albuterol (PROVENTIL) (2.5 MG/3ML) 0.083% neb solution     albuterol (PROAIR HFA/PROVENTIL HFA/VENTOLIN HFA) 108 (90 Base) MCG/ACT inhaler     budesonide (PULMICORT) 0.5 MG/2ML neb solution     cetirizine (ZYRTEC) 10 MG tablet     montelukast (SINGULAIR) 5 MG chewable tablet   Lungs CTAB, afebrile, NAD.   Suspect symptoms due to seasonal allergies/asthma. Refills of meds given- use Pulmicort while symptomatic and for a few days after. Albuterol PRN if Pulmicort not helping with wheezing/SOB. Take Singulair and Zyrtec " daily through the season change.    I have discussed any lab or imaging results, the patient's diagnosis, and my plan of treatment with the patient and/or family. Patient is aware to come back in if with worsening symptoms or if no relief despite treatment plan.  Patient voiced understanding and had no further questions.       Follow Up: Return in about 2 weeks (around 5/9/2019) for Follow up w/ PCP if not better.    CINTIA Dos Santos, PA-C  [facility]

## 2025-05-27 NOTE — Clinical Note
The PA catheter was repositioned to the main pulmonary artery. Hemodynamics were performed. CO = 4.39

## 2025-05-27 NOTE — MEDICAL/APP STUDENT
[facility] - Telemetry (Hospital)  Consult Note    Patient Name: Henry Harmon  MRN: [id number]  Admission Date: 5/27/2025  Hospital Length of Stay: 0 days  Attending Physician: Spenser Lyons MD   Primary Care Provider: Catie, Primary Doctor     Patient information was obtained from patient and ER records.     Consults  Inpatient consult to Cardiology  Consult ordered by: Darius Schuler MD  Reason for consult: CHF      Subjective:   Chief Complaint:  Chief Complaint   Patient presents with    Shortness of Breath     Pt c/o SOB, L sided CP started yest & worsening today. +cough. Hx of HTN, not currently on meds     HPI:  Henry Harmon  is a 40 y.o. male w/ PMHx of HTN who presented to the ED with SOB, left-sided chest pain, and cough that started 1 day ago. Symptoms have worsen which led patient to come to the ED. Reports SOB worsens on exertion and when lying flat. States he has been having asleep with 2 pillows in mostly in the upright position. Denies history of heart failure.    Initial labs in the ED revealed K 2.9, , troponin 0.110, lactic acid 1.1. Initial BP was 231/125. EKG showed NSR, biatrial enlargement, prolonged QT interval. CXR showed pulmonary vascular congestion with moderate interstitial/pulmonary edema, moderate cardiomegaly, no pneumothorax or pleural effusion. Patient was given 60 mg Lasix IV, 10 mg hydralazine IV, 20 mg hydralazine IV, 325 mg aspirin. Patient was admitted by Hospital Medicine for further workup for CHF. Cardiology consulted to assist in management and treatment.    Hospital Course:  5/27/2025 - Patient was seen and examined today, laying in bed. Feeling better. SOB improved. No ESTRADA from getting up and using the restroom. Denies chest pain. Reports of minimal coughing. Reports the last time he took his blood pressure medications was about 6-12 months ago. States he was unable to get his medications refilled. Per chart review, patient was prescribed  amlodipine, aspirin, hydrochlorothiazide, and losartan. Has not seen his PCP in over a year. Reports of occasional alcohol consumption on the weekends. States he is not watchful of his diet or salt intake. Output of 2700 mL. BP still elevated. Labs reviewed. Troponin 0.143. TTE pending.       Review of Systems   Respiratory:  Positive for cough. Negative for shortness of breath.    Cardiovascular:  Negative for chest pain and leg swelling.     Objective:     Vital Signs (Most Recent):  Temp: 98.1 °F (36.7 °C) (05/27/25 1243)  Pulse: 87 (05/27/25 1243)  Resp: 18 (05/27/25 1243)  BP: (!) 164/95 (05/27/25 1415)  SpO2: 96 % (05/27/25 1243) Vital Signs (24h Range):  Temp:  [97.6 °F (36.4 °C)-99.1 °F (37.3 °C)] 98.1 °F (36.7 °C)  Pulse:  [71-97] 87  Resp:  [15-33] 18  SpO2:  [91 %-100 %] 96 %  BP: (132-233)/() 164/95     Weight: 100 kg (220 lb 7.4 oz)  Body mass index is 36.69 kg/m².    Intake/Output Summary (Last 24 hours) at 5/27/2025 1425  Last data filed at 5/27/2025 1417  Gross per 24 hour   Intake --   Output 3400 ml   Net -3400 ml         Physical Exam  Vitals and nursing note reviewed.   Constitutional:       General: He is not in acute distress.     Appearance: Normal appearance. He is not ill-appearing or diaphoretic.   HENT:      Head: Normocephalic and atraumatic.   Eyes:      Pupils: Pupils are equal, round, and reactive to light.   Cardiovascular:      Rate and Rhythm: Normal rate and regular rhythm.      Heart sounds: No murmur heard.  Pulmonary:      Effort: Pulmonary effort is normal.      Breath sounds: Rales present.   Musculoskeletal:      Right lower leg: No edema.      Left lower leg: No edema.   Skin:     General: Skin is warm and dry.   Neurological:      Mental Status: He is alert and oriented to person, place, and time.   Psychiatric:         Mood and Affect: Mood normal.         Behavior: Behavior normal.               Significant Labs: All pertinent labs within the past 24 hours have been  reviewed.  CBC:   Recent Labs   Lab 05/27/25 0205   WBC 10.43   HGB 15.7   HCT 45.8        CMP:   Recent Labs   Lab 05/27/25 0205 05/27/25  0643    138   K 2.9* 3.3*    102   CO2 23 25   GLU 96 112*   BUN 13 13   CREATININE 1.0 1.0   CALCIUM 8.5* 8.7   PROT 7.2  --    ALBUMIN 3.8  --    BILITOT 0.6  --    ALKPHOS 87  --    AST 26  --    ALT 30  --    ANIONGAP 12 11     Cardiac Markers:   Recent Labs   Lab 05/27/25 0205   *     Lactic Acid:   Recent Labs   Lab 05/27/25 0205   LACTATE 1.1     Troponin:   Recent Labs   Lab 05/27/25 0205 05/27/25 0643   TROPONINI 0.110* 0.143*     TSH:   Recent Labs   Lab 05/27/25 0205   TSH 4.238*       Significant Imaging: I have reviewed all pertinent imaging results/findings within the past 24 hours.    Imaging Results              X-Ray Chest AP Portable (Final result)  Result time 05/27/25 07:45:51      Final result by Taran Stauffer MD (05/27/25 07:45:51)                   Impression:      As above.      Electronically signed by: Taran Stauffer  Date:    05/27/2025  Time:    07:45               Narrative:    EXAMINATION:  XR CHEST AP PORTABLE    CLINICAL HISTORY:  sob;.    TECHNIQUE:  Single frontal portable view of the chest was performed.    COMPARISON:  None    FINDINGS:  Pulmonary vascular congestion with moderate interstitial/pulmonary edema.  Moderate cardiomegaly.  No pneumothorax or pleural effusion.                                  EKG 5/27/2025: normal sinus rhythm, biatrial enlargement, prolonged QT interval.     TTE 5/27/2025: pending.    Assessment/Plan:   Mr. Harmon presents with SOB and left-sided chest pain due to CHF exacerbation and hypertensive urgency. Output of 2700 mL. Continue to diuresis. TTE pending. Discussed with patient the importance of taking their medications. Educated patient about alcohol consumption and salt intake. F/u with outpatient cardiology upon discharge.    * CHF exacerbation  - SOB improved  - Denies SOB,  ESTRADA, and chest pain  - Continue to diuresis w/ furosemide 40 mg IV  - Will add losartan 100 mg po daily  - TTE pending  - Monitor I&O's  - Educated on alcohol consumption and salt intake  - Discussed getting a stress test done     Hypertensive urgency  - Reports of not taking medications for about 6-12 months  - BP still elevated  - Will add losartan 100 mg po daily  - Continue to optimize medications  - Continue to diuresis  - TTE pending     Hypokalemia  - Repletion deferred to      Elevated troponin  - Troponin 0.110 -> 0.143  - Elevation likely due to increased cardiac demand  - Continue to monitor    VTE Risk Mitigation (From admission, onward)           Ordered     enoxaparin injection 40 mg  Daily         05/27/25 0449     IP VTE HIGH RISK PATIENT  Once         05/27/25 0449     Place sequential compression device  Until discontinued         05/27/25 0449                    Thank you for your consult. We will follow-up with patient. Please contact us if you have any additional questions.    APRIL Alejandre - Telemetry ([facility])

## 2025-05-27 NOTE — PROGRESS NOTES
"  [facility]) nurse navigator notified of [facility] candidate in need of education and introduction to 4-6 week program.      PT aao x 3 while lying in bed  . Introduced self to pt as [facility] nurse navigator.     Patient given "Home Care Guide for Heart Failure Patients" , "[facility]" flyer and "Daily weight and symptom tracker".  Encouraged pt to review information.      Reviewed the following key points of H[facility] program with pt and family:   1.) Take your medications as directed.    2.) Weight yourself daily   3.) Follow low salt and limited fluid diet.    4.) Stop smoking and start exercising   5.) Go to your appointments and call your team.      Pt reminded to follow Symptom tracker and to call at the onset of symptoms according to tracker.     Reviewed plan for follow up once discharged to include phone calls, in person and virtual visits to assist pt optimizing their heart failure medication regimen and encouraging healthy lifestyle modifications.  Reminded pt that program will assist them over the next 4-6 weeks and then patient will be transferred to long term care provider .  Reminded pt how to contact [facility] navigator via phone and or via [employer].     Pt currently uninsured. Medicaid application submitted this admission and now pending. Pt given direct contact info for [facility] and will call to schedule appt once medicaid approved.     Pt also reminded HF nurse will call 48-72 hours after discharge to check on them.     PT  verbalize read back of information given.  Encouraged pt and family to read over information often and contact team with any questions or concerns.     "

## 2025-05-27 NOTE — Clinical Note
The PA catheter was inserted into the right atrium. Hemodynamics were performed. inserted over sv5 wire.

## 2025-05-27 NOTE — ED PROVIDER NOTES
SCRIBE #1 NOTE: I, Magdi Borges, am scribing for, and in the presence of, Nuno Gallo DO. I have scribed the entire note.       History     Chief Complaint   Patient presents with    Shortness of Breath     Pt c/o SOB, L sided CP started yest & worsening today. +cough. Hx of HTN, not currently on meds     Review of patient's allergies indicates:   Allergen Reactions    Penicillins Rash         History of Present Illness     HPI    5/27/2025, 2:11 AM  History obtained from the patient      History of Present Illness: Henry Harmon is a 40 y.o. male patient with a PMHx of HTN who presents to the Emergency Department for evaluation of SOB which began 1 day ago.Pt reports L-sided chest pain and his sxs have worsened today that prompted his ER visit. Pt reports that the doctor he sees took him off of his BP medications. Symptoms are constant and moderate in severity. No mitigating or exacerbating factors reported. Associated sxs include cough. Patient denies any nausea. No prior Tx specified.  No further complaints or concerns at this time.       Arrival mode: Personal Transportation    PCP: Catie, Primary Doctor        Past Medical History:  Past Medical History:   Diagnosis Date    Hypertension        Past Surgical History:  History reviewed. No pertinent surgical history.      Family History:  No family history on file.    Social History:  Social History     Tobacco Use    Smoking status: Never    Smokeless tobacco: Never   Vaping Use    Vaping status: Never Used   Substance and Sexual Activity    Alcohol use: Not Currently     Comment: Occasional use    Drug use: Never    Sexual activity: Not on file        Review of Systems     As noted in HPI.  Review of Systems     Physical Exam     Initial Vitals [05/27/25 0146]   BP Pulse Resp Temp SpO2   (!) 231/125 95 (!) 26 99.1 °F (37.3 °C) (!) 91 %      MAP       --          Physical Exam  Constitutional:       General: He is in acute distress.       Appearance: He is well-developed.   Cardiovascular:      Rate and Rhythm: Normal rate and regular rhythm.      Heart sounds: No murmur heard.  Pulmonary:      Comments: Tachypnea with coarse breath sounds bilaterally  Abdominal:      General: There is no distension.      Palpations: Abdomen is soft.      Tenderness: There is no abdominal tenderness.   Musculoskeletal:         General: Normal range of motion.   Skin:     General: Skin is warm and dry.      Findings: No rash.   Neurological:      General: No focal deficit present.      Mental Status: He is alert and oriented to person, place, and time.      Cranial Nerves: No cranial nerve deficit.      Motor: No weakness.         Nursing Notes and Vital Signs Reviewed.     ED Course   Critical Care    Date/Time: 5/27/2025 4:30 AM    Performed by: Nuno Gallo DO  Authorized by: Nuno Gallo DO  Direct patient critical care time: 20 minutes  Ordering / reviewing critical care time: 5 minutes  Documentation critical care time: 5 minutes  Consulting other physicians critical care time: 5 minutes  Total critical care time (exclusive of procedural time) : 35 minutes  Critical care time was exclusive of separately billable procedures and treating other patients.  Critical care was necessary to treat or prevent imminent or life-threatening deterioration of the following conditions: cardiac failure.  Critical care was time spent personally by me on the following activities: development of treatment plan with patient or surrogate, discussions with consultants, interpretation of cardiac output measurements, evaluation of patient's response to treatment, obtaining history from patient or surrogate, examination of patient, ordering and performing treatments and interventions, ordering and review of laboratory studies, ordering and review of radiographic studies, pulse oximetry, re-evaluation of patient's condition and review of old charts.        ED Vital  "Signs:  Vitals:    05/27/25 0545 05/27/25 0600 05/27/25 0623 05/27/25 0831   BP: (!) 161/86 (!) 169/91 (!) 176/107    Pulse: 93 97 93    Resp: (!) 26 (!) 33 18    Temp:   97.6 °F (36.4 °C)    TempSrc:       SpO2: 99% 100% 96%    Weight:    100 kg (220 lb 7.4 oz)   Height:    5' 5" (1.651 m)    05/27/25 0900 05/27/25 0907 05/27/25 0943 05/27/25 1113   BP:       Pulse: 84   88   Resp:       Temp:       TempSrc:       SpO2:  99% 98%    Weight:       Height:        05/27/25 1243 05/27/25 1415 05/27/25 1600 05/27/25 2023   BP: (!) 164/95 (!) 164/95 (!) 144/92 (!) 157/83   Pulse: 87  73 71   Resp: 18  18 20   Temp: 98.1 °F (36.7 °C)  98.1 °F (36.7 °C) 98.2 °F (36.8 °C)   TempSrc: Oral  Oral Oral   SpO2: 96%  96% (!) 94%   Weight:       Height:        05/27/25 2158 05/28/25 0022 05/28/25 0440   BP:  (!) 164/101 (!) 145/93   Pulse: 65 69 61   Resp:  20 20   Temp:  97.6 °F (36.4 °C) 97.7 °F (36.5 °C)   TempSrc:  Oral Oral   SpO2:  96% 98%   Weight:      Height:          Abnormal Lab Results:  Labs Reviewed   COMPREHENSIVE METABOLIC PANEL - Abnormal       Result Value    Sodium 138      Potassium 2.9 (*)     Chloride 103      CO2 23      Glucose 96      BUN 13      Creatinine 1.0      Calcium 8.5 (*)     Protein Total 7.2      Albumin 3.8      Bilirubin Total 0.6      ALP 87      AST 26      ALT 30      Anion Gap 12      eGFR >60     TROPONIN I - Abnormal    Troponin-I 0.110 (*)    B-TYPE NATRIURETIC PEPTIDE - Abnormal     (*)    CBC WITH DIFFERENTIAL - Abnormal    WBC 10.43      RBC 5.21      HGB 15.7      HCT 45.8      MCV 88      MCH 30.1      MCHC 34.3      RDW 13.6      Platelet Count 233      MPV 9.8      Nucleated RBC 0      Neut % 58.2      Lymph % 29.5      Mono % 7.3      Eos % 3.5      Basophil % 0.7      Imm Grans % 0.8 (*)     Neut # 6.07      Lymph # 3.08      Mono # 0.76      Eos # 0.37      Baso # 0.07      Imm Grans # 0.08 (*)    URINALYSIS, REFLEX TO URINE CULTURE - Abnormal    Color, UA Colorless (*) " "    Appearance, UA Clear      pH, UA 7.0      Spec Grav UA 1.010      Protein, UA 1+ (*)     Glucose, UA Negative      Ketones, UA Negative      Bilirubin, UA Negative      Blood, UA Negative      Nitrites, UA Negative      Urobilinogen, UA Negative      Leukocyte Esterase, UA Negative     TSH - Abnormal    TSH 4.238 (*)     Free T4 0.99     INFLUENZA A & B BY MOLECULAR - Normal    INFLUENZA A MOLECULAR Negative      INFLUENZA B MOLECULAR  Negative     HEPATITIS C ANTIBODY - Normal    Hep C Ab Interp Negative     HIV 1 / 2 ANTIBODY - Normal    HIV 1/2 Ag/Ab Negative     LACTIC ACID, PLASMA - Normal    Lactic Acid Level 1.1      Narrative:     Falsely low lactic acid results can be found in samples containing >=13.0 mg/dL total bilirubin and/or >=3.5 mg/dL direct bilirubin.    SARS-COV-2 RNA AMPLIFICATION, QUAL - Normal    SARS COV-2 Molecular Negative     DRUG SCREEN PANEL, URINE EMERGENCY - Normal    Benzodiazepine, Urine Negative      Methadone, Urine Negative      Cocaine, Urine Negative      Opiates, Urine Negative      Barbiturates, Urine Negative      Amphetamines, Urine Negative      THC Negative      Phencyclidine, Urine Negative      Urine Creatinine 31.4      Narrative:     This screen includes the following classes of drugs at the listed cut-off:     Benzodiazepines:        200 ng/ml   Methadone:              300 ng/ml   Cocaine metabolite:     300 ng/ml   Opiates:                300 ng/ml   Barbiturates:           200 ng/ml   Amphetamines:           1000 ng/ml   Marijuana metabs (THC): 50 ng/ml   Phencyclidine (PCP):    25 ng/ml     This is a screening test. If results do not correlate with clinical presentation, then a confirmatory send out test is advised.    This report is intended for use in clinical monitoring and management of patients. It is not intended for use in employment related drug testing."   MAGNESIUM - Normal    Magnesium  1.8     HEP C VIRUS HOLD SPECIMEN    Extra Tube Hold for add-ons. "     CBC W/ AUTO DIFFERENTIAL    Narrative:     The following orders were created for panel order CBC auto differential.  Procedure                               Abnormality         Status                     ---------                               -----------         ------                     CBC with Differential[[id number]]       Abnormal            Final result                 Please view results for these tests on the individual orders.   EXTRA TUBES    Narrative:     The following orders were created for panel order EXTRA TUBES.  Procedure                               Abnormality         Status                     ---------                               -----------         ------                     Light Blue Top Hold[[id number]]                             Final result               Light Green Top Hold[[id number]]                            Final result                 Please view results for these tests on the individual orders.   LIGHT BLUE TOP HOLD    Extra Tube Hold for add-ons.     LIGHT GREEN TOP HOLD    Extra Tube Hold for add-ons.     GREY TOP URINE HOLD    Extra Tube Hold for add-ons.     URINALYSIS MICROSCOPIC    RBC, UA 0      WBC, UA 0      WBC Clumps, UA None      Bacteria, UA None      Yeast, UA None      Squamous Epithelial Cells, UA 0      Non-Squamous Epithelial Cells 0      Hyaline Casts, UA 1      Epithelial Casts 0      WBC Casts 0      RBC Casts 0      Granular Casts 0      Fatty Casts, UA 0      Waxy Casts, UA 0      Triple Phosphate Crystals, UA None      Calcium Oxalate Crystals, UA None      Calcium Phosphate Crystal None      Calcium Carbonate Crystal None      Ammonium Urate Crystals None      Uric Acid Crystals, UA None      Amorphous, UA None      Unclassified Crystals None      Trichomonas, UA None      Other, UA None      Microscopic Comment       LIPID PANEL        All Lab Results:  Results for orders placed or performed during the hospital encounter of 05/27/25   EKG  12-lead    Collection Time: 05/27/25  1:53 AM   Result Value Ref Range    QRS Duration 98 ms    [facility] QTC Calculation 490 ms   Blood culture #1 **CANNOT BE ORDERED STAT**    Collection Time: 05/27/25  2:05 AM    Specimen: Peripheral, Forearm, Left; Blood   Result Value Ref Range    Blood Culture No Growth After 6 Hours    Hepatitis C Antibody    Collection Time: 05/27/25  2:05 AM   Result Value Ref Range    Hep C Ab Interp Negative Negative   HCV Virus Hold Specimen    Collection Time: 05/27/25  2:05 AM   Result Value Ref Range    Extra Tube Hold for add-ons.    HIV 1/2 Ag/Ab (4th Gen)    Collection Time: 05/27/25  2:05 AM   Result Value Ref Range    HIV 1/2 Ag/Ab Negative Negative   Comprehensive metabolic panel    Collection Time: 05/27/25  2:05 AM   Result Value Ref Range    Sodium 138 136 - 145 mmol/L    Potassium 2.9 (L) 3.5 - 5.1 mmol/L    Chloride 103 95 - 110 mmol/L    CO2 23 23 - 29 mmol/L    Glucose 96 70 - 110 mg/dL    BUN 13 6 - 20 mg/dL    Creatinine 1.0 0.5 - 1.4 mg/dL    Calcium 8.5 (L) 8.7 - 10.5 mg/dL    Protein Total 7.2 6.0 - 8.4 gm/dL    Albumin 3.8 3.5 - 5.2 g/dL    Bilirubin Total 0.6 0.1 - 1.0 mg/dL    ALP 87 40 - 150 unit/L    AST 26 11 - 45 unit/L    ALT 30 10 - 44 unit/L    Anion Gap 12 8 - 16 mmol/L    eGFR >60 >60 mL/min/1.73/m2   Troponin I    Collection Time: 05/27/25  2:05 AM   Result Value Ref Range    Troponin-I 0.110 (H) <=0.026 ng/mL   Brain natriuretic peptide    Collection Time: 05/27/25  2:05 AM   Result Value Ref Range     (H) 0 - 99 pg/mL   Lactic Acid, Plasma    Collection Time: 05/27/25  2:05 AM   Result Value Ref Range    Lactic Acid Level 1.1 0.5 - 2.2 mmol/L   CBC with Differential    Collection Time: 05/27/25  2:05 AM   Result Value Ref Range    WBC 10.43 3.90 - 12.70 K/uL    RBC 5.21 4.60 - 6.20 M/uL    HGB 15.7 14.0 - 18.0 gm/dL    HCT 45.8 40.0 - 54.0 %    MCV 88 82 - 98 fL    MCH 30.1 27.0 - 31.0 pg    MCHC 34.3 32.0 - 36.0 g/dL    RDW 13.6 11.5 - 14.5 %     Platelet Count 233 150 - 450 K/uL    MPV 9.8 9.2 - 12.9 fL    Nucleated RBC 0 <=0 /100 WBC    Neut % 58.2 38 - 73 %    Lymph % 29.5 18 - 48 %    Mono % 7.3 4 - 15 %    Eos % 3.5 <=8 %    Basophil % 0.7 <=1.9 %    Imm Grans % 0.8 (H) 0.0 - 0.5 %    Neut # 6.07 1.8 - 7.7 K/uL    Lymph # 3.08 1 - 4.8 K/uL    Mono # 0.76 0.3 - 1 K/uL    Eos # 0.37 <=0.5 K/uL    Baso # 0.07 <=0.2 K/uL    Imm Grans # 0.08 (H) 0.00 - 0.04 K/uL   TSH    Collection Time: 05/27/25  2:05 AM   Result Value Ref Range    TSH 4.238 (H) 0.400 - 4.000 uIU/mL    Free T4 0.99 0.71 - 1.51 ng/dL   Light Blue Top Hold    Collection Time: 05/27/25  2:05 AM   Result Value Ref Range    Extra Tube Hold for add-ons.    Light Green Top Hold    Collection Time: 05/27/25  2:05 AM   Result Value Ref Range    Extra Tube Hold for add-ons.    Magnesium    Collection Time: 05/27/25  2:05 AM   Result Value Ref Range    Magnesium  1.8 1.6 - 2.6 mg/dL   Blood culture #2 **CANNOT BE ORDERED STAT**    Collection Time: 05/27/25  2:08 AM    Specimen: Peripheral, Forearm, Right; Blood   Result Value Ref Range    Blood Culture No Growth After 6 Hours    Influenza A & B by Molecular    Collection Time: 05/27/25  2:12 AM    Specimen: Nasal Swab   Result Value Ref Range    INFLUENZA A MOLECULAR Negative Negative    INFLUENZA B MOLECULAR  Negative Negative   COVID-19 Rapid Screening    Collection Time: 05/27/25  2:12 AM   Result Value Ref Range    SARS COV-2 Molecular Negative Negative   Drug screen panel, emergency    Collection Time: 05/27/25  2:45 AM   Result Value Ref Range    Benzodiazepine, Urine Negative Negative    Methadone, Urine Negative Negative    Cocaine, Urine Negative Negative    Opiates, Urine Negative Negative    Barbiturates, Urine Negative Negative    Amphetamines, Urine Negative Negative    THC Negative Negative    Phencyclidine, Urine Negative Negative    Urine Creatinine 31.4 23.0 - 375.0 mg/dL   Urinalysis, Reflex to Urine Culture Urine, Clean Catch     Collection Time: 05/27/25  2:45 AM    Specimen: Urine, Clean Catch   Result Value Ref Range    Color, UA Colorless (A) Straw, Roxy, Yellow, Light-Orange    Appearance, UA Clear Clear    pH, UA 7.0 5.0 - 8.0    Spec Grav UA 1.010 1.005 - 1.030    Protein, UA 1+ (A) Negative    Glucose, UA Negative Negative    Ketones, UA Negative Negative    Bilirubin, UA Negative Negative    Blood, UA Negative Negative    Nitrites, UA Negative Negative    Urobilinogen, UA Negative <2.0 EU/dL    Leukocyte Esterase, UA Negative Negative   GREY TOP URINE HOLD    Collection Time: 05/27/25  2:45 AM   Result Value Ref Range    Extra Tube Hold for add-ons.    Urinalysis Microscopic    Collection Time: 05/27/25  2:45 AM   Result Value Ref Range    RBC, UA 0 0 - 4 /HPF    WBC, UA 0 0 - 5 /HPF    WBC Clumps, UA None None, Rare    Bacteria, UA None None, Rare, Occasional /HPF    Yeast, UA None None /HPF    Squamous Epithelial Cells, UA 0 /HPF    Non-Squamous Epithelial Cells 0 <=0 /HPF    Hyaline Casts, UA 1 0 - 1 /LPF    Epithelial Casts 0 None /lpf    WBC Casts 0 None /LPF    RBC Casts 0 None /LPF    Granular Casts 0 None  /LPF    Fatty Casts, UA 0 None /LPF    Waxy Casts, UA 0 None /lpf    Triple Phosphate Crystals, UA None None, Rare, Occasional, Few, Moderate    Calcium Oxalate Crystals, UA None None, Rare, Occasional, Few, Moderate    Calcium Phosphate Crystal None Rare, None, Moderate, Few, Occasional    Calcium Carbonate Crystal None Occasional, Rare, Few, None, Moderate    Ammonium Urate Crystals None Moderate, Occasional, Rare, Few, None    Uric Acid Crystals, UA None None, Rare, Occasional, Few, Moderate    Amorphous, UA None None, Occasional, Few, Moderate, Rare    Unclassified Crystals None None, Rare, Occasional, Few, Moderate    Trichomonas, UA None None /HPF    Other, UA None None    Microscopic Comment     Hemoglobin A1C    Collection Time: 05/27/25  6:43 AM   Result Value Ref Range    Hemoglobin A1c 5.3 4.0 - 5.6 %     Estimated Average Glucose 105 68 - 131 mg/dL   Basic metabolic panel    Collection Time: 05/27/25  6:43 AM   Result Value Ref Range    Sodium 138 136 - 145 mmol/L    Potassium 3.3 (L) 3.5 - 5.1 mmol/L    Chloride 102 95 - 110 mmol/L    CO2 25 23 - 29 mmol/L    Glucose 112 (H) 70 - 110 mg/dL    BUN 13 6 - 20 mg/dL    Creatinine 1.0 0.5 - 1.4 mg/dL    Calcium 8.7 8.7 - 10.5 mg/dL    Anion Gap 11 8 - 16 mmol/L    eGFR >60 >60 mL/min/1.73/m2   Troponin I    Collection Time: 05/27/25  6:43 AM   Result Value Ref Range    Troponin-I 0.143 (H) <=0.026 ng/mL   Echo    Collection Time: 05/27/25  8:56 AM   Result Value Ref Range    BSA 2.14 m2    LA WIDTH 4.2 cm    RA Width 4.5 cm    A4C EF 43 %    LVOT stroke volume 93.4 cm3    LVIDd 5.8 3.5 - 6.0 cm    LV Systolic Volume 113 mL    LV Systolic Volume Index 54.9 mL/m2    LVIDs 4.9 (A) 2.1 - 4.0 cm    LV ESV A2C 114.04 mL    LV Diastolic Volume 167 mL    LV ESV A4C 67.54 mL    LV Diastolic Volume Index 81.07 mL/m2    LV EDV A4C 134.14 mL    Left Ventricular End Systolic Volume by Teichholz Method 113.29 mL    Left Ventricular End Diastolic Volume by Teichholz Method 166.68 mL    IVS 1.3 (A) 0.6 - 1.1 cm    LVOT diameter 2.3 cm    LVOT area 4.2 cm2    FS 15.5 (A) 28 - 44 %    Left Ventricle Relative Wall Thickness 0.62 cm    PW 1.8 (A) 0.6 - 1.1 cm    LV mass 424.8 g    LV Mass Index 206.2 g/m2    MV Peak E Keon 1.00 m/s    TDI LATERAL 0.03 m/s    TDI SEPTAL 0.05 m/s    E/E' ratio 25 m/s    MV Peak A Keon 1.14 m/s    TR Max Keon 2.8 m/s    E/A ratio 0.88     IVRT 86 msec    E wave deceleration time 142 msec    LV SEPTAL E/E' RATIO 20.0 m/s    JOSH 53 mL/m2    LV LATERAL E/E' RATIO 33.3 m/s    LA Vol 108 cm3    LVOT peak keon 1.3 m/s    Left Ventricular Outflow Tract Mean Velocity 0.93 cm/s    Left Ventricular Outflow Tract Mean Gradient 3.84 mmHg    RV- caraballo basal diam 3.9 cm    RVOT peak VTI 14.7 cm    TAPSE 2.9 cm    RV/LV Ratio 0.67 cm    LA size 4.3 cm    Left Atrium Minor Axis  6.9 cm    Left Atrium Major Axis 7.2 cm    LA Vol (MOD) 88 mL    JOSH (MOD) 43 mL/m2    RA Major Axis 6.52 cm    AV regurgitation pressure 1/2 time 454 ms    AR Max Keon 5.37 m/s    AV mean gradient 11 mmHg    AV peak gradient 16 mmHg    Ao peak keon 2.0 m/s    Ao VTI 35.1 cm    LVOT peak VTI 22.5 cm    AV valve area 2.7 cm²    AV Velocity Ratio 0.65     AV index (prosthetic) 0.64     GODWIN by Velocity Ratio 2.7 cm²    MV mean gradient 4 mmHg    MV peak gradient 8 mmHg    MV stenosis pressure 1/2 time 42.72 ms    MV valve area p 1/2 method 5.15 cm2    MV valve area by continuity eq 3.27 cm2    MV VTI 28.6 cm    Triscuspid Valve Regurgitation Peak Gradient 30 mmHg    PV mean gradient 2 mmHg    PV PEAK VELOCITY 1.49 m/s    PV peak gradient 9 mmHg    Pulmonary Valve Mean Velocity 0.92 m/s    RVOT peak keon 0.90 m/s    Ao root annulus 4.0 cm    STJ 2.6 cm    Ascending aorta 3.9 cm    ASI 1.9 cm/m2    IVC diameter 1.89 cm    Mean e' 0.04 m/s    ZLVIDS 1.99     ZLVIDD -0.70     LA area A4C 23.40 cm2    LA area A2C 29.94 cm2    EF 45 %    TV resting pulmonary artery pressure 34 mmHg    RV TB RVSP 6 mmHg    Est. RA pres 3 mmHg       Imaging Results:  Imaging Results              X-Ray Chest AP Portable (Final result)  Result time 05/27/25 07:45:51      Final result by Taran Stauffer MD (05/27/25 07:45:51)                   Impression:      As above.      Electronically signed by: Taran Stauffer  Date:    05/27/2025  Time:    07:45               Narrative:    EXAMINATION:  XR CHEST AP PORTABLE    CLINICAL HISTORY:  sob;.    TECHNIQUE:  Single frontal portable view of the chest was performed.    COMPARISON:  None    FINDINGS:  Pulmonary vascular congestion with moderate interstitial/pulmonary edema.  Moderate cardiomegaly.  No pneumothorax or pleural effusion.                                       The EKG was ordered, reviewed, and independently interpreted by the ED provider.  Interpretation time: 01:53  Rate: 97 BPM  Rhythm:  normal sinus rhythm  Interpretation: Biatrial enlargement. Rightward axis. Biventricular hypertrophy with repolarization abnormality. Prolonged QT. No STEMI.         The Emergency Provider reviewed the vital signs and test results, which are outlined above.     ED Discussion     4:38 AM: Discussed case with Wayne Schuler NP ([facility]). Dr. Schuler agrees with current care and management of pt and accepts admission.   Admitting Service: [facility]  Admitting Physician: Dr. Schuler  Admit to: Inpatient    4:38 AM: Re-evaluated pt. I have discussed test results, shared treatment plan, and the need for admission with patient/family/caretaker at bedside. Pt and/or family/caretaker express understanding at this time and agree with all information. All questions answered. Pt/caretaker/family member(s) have no further questions or concerns at this time. Pt is ready for admit.      ED Course as of 05/28/25 0511   Tue May 27, 2025   0400 Drug screen panel, emergency  Within normal limits [CD]   0401 Urinalysis, Reflex to Urine Culture Urine, Clean Catch(!)  No UTI [CD]   0401 COVID-19 Rapid Screening  Negative [CD]   0401 Blood culture #2 **CANNOT BE ORDERED STAT**  Pending [CD]   0401 Troponin I(!)  Elevated [CD]   0401 Hepatitis C Antibody  Negative [CD]   0401 Brain natriuretic peptide(!)  Elevated [CD]   0401 Lactic Acid, Plasma  Within normal limits [CD]   0401 CBC auto differential(!)  Nonspecific [CD]   0401 Comprehensive metabolic panel(!)  Hypokalemia and other nonspecific findings [CD]   0402 Magnesium  Within normal limits [CD]   0402 HIV 1/2 Ag/Ab (4th Gen)  Negative [CD]   0402 TSH(!)  Within normal limits [CD]   0402 Influenza A & B by Molecular  Negative [CD]      ED Course User Index  [CD] Nuno Gallo, DO     Medical Decision Making  Amount and/or Complexity of Data Reviewed  Labs: ordered. Decision-making details documented in ED Course.  Radiology: ordered. Decision-making  details documented in ED Course.  ECG/medicine tests: ordered and independent interpretation performed. Decision-making details documented in ED Course.    Risk  OTC drugs.  Prescription drug management.  Decision regarding hospitalization.  Risk Details: Differential diagnosis includes but is not limited to: ACS, heart failure, hypertensive emergency, noncompliance with medications, dysrhythmia                ED Medication(s):  Medications   nitroGLYCERIN SL tablet 0.4 mg (0.4 mg Sublingual Given 5/27/25 0238)   sodium chloride 0.9% flush 10 mL (has no administration in time range)   enoxaparin injection 40 mg (40 mg Subcutaneous Given 5/27/25 1705)   ondansetron disintegrating tablet 8 mg (has no administration in time range)   promethazine tablet 25 mg (has no administration in time range)   polyethylene glycol packet 17 g (has no administration in time range)   acetaminophen tablet 650 mg (650 mg Oral Given 5/27/25 1134)   acetaminophen tablet 650 mg (has no administration in time range)   losartan tablet 100 mg (100 mg Oral Given 5/27/25 1415)   furosemide injection 40 mg (40 mg Intravenous Given 5/27/25 2141)   furosemide injection 60 mg (60 mg Intravenous Given 5/27/25 0217)   potassium bicarbonate disintegrating tablet 50 mEq (50 mEq Oral Given 5/27/25 0310)   acetaminophen tablet 650 mg (650 mg Oral Given 5/27/25 0321)   hydrALAZINE injection 10 mg (10 mg Intravenous Given 5/27/25 0321)   hydrALAZINE injection 20 mg (20 mg Intravenous Given 5/27/25 0414)   aspirin tablet 325 mg (325 mg Oral Given 5/27/25 0448)       Current Discharge Medication List                  Scribe Attestation:   Scribe #1: I performed the above scribed service and the documentation accurately describes the services I performed. I attest to the accuracy of the note.     Attending:   Physician Attestation Statement for Scribe #1: I, Nuno Gallo DO., personally performed the services described in this documentation, as  scribed by Magdi Borges, in my presence, and it is both accurate and complete.           Clinical Impression       ICD-10-CM ICD-9-CM   1. Acute on chronic congestive heart failure, unspecified heart failure type  I50.9 428.0   2. Shortness of breath  R06.02 786.05   3. Hypertension, unspecified type  I10 401.9   4. CHF exacerbation  I50.9 428.0       Disposition:   Disposition: Admitted  Condition: Nuno Crespo, DO  05/28/25 0516

## 2025-05-27 NOTE — Clinical Note
The DP pulses were 2+ bilaterally. The PT pulses were 2+ bilaterally. The radial pulses were +2 bilaterally. The brachial pulses were 2+ bilaterally.

## 2025-05-27 NOTE — PLAN OF CARE
A254/A254 JOSE J  Henry Harmon is a 40 y.o.male admitted on 5/27/2025 for CHF exacerbation   Code Status: Full Code MRN: [id number]   Review of patient's allergies indicates:   Allergen Reactions    Penicillins Rash     Past Medical History:   Diagnosis Date    Hypertension       PRN meds    acetaminophen, 650 mg, Q8H PRN  acetaminophen, 650 mg, Q4H PRN  nitroGLYCERIN, 0.4 mg, Q5 Min PRN  ondansetron, 8 mg, Q8H PRN  polyethylene glycol, 17 g, Daily PRN  promethazine, 25 mg, Q6H PRN  sodium chloride 0.9%, 10 mL, PRN         Pt oriented x4.  VSS.  Pt remained afebrile throughout this shift.   All meds administered per order.   Pt remained free of falls this shift.   Plan of care reviewed. Patient verbalizes understanding.   Pt moving/turning independently.   Bed low, side rails up x 2, wheels locked, call light in reach.   Patient instructed to call for assistance.  Patient education provided      Chart check completed. Will continue plan of care.         Cabot Coma Scale Score: 15     Lead Monitored: Lead II Rhythm: normal sinus rhythm       VTE Core Measure: Pharmacological prophylaxis initiated/maintained Last Bowel Movement: 05/26/25  Diet Low Sodium (2 gm) Fluid - 1500mL     Fredi Score: 23  Fall Risk Score: 6  Accucheck []   Freq?      Lines/Drains/Airways       Peripheral Intravenous Line  Duration                  Peripheral IV - Single Lumen 05/27/25 0205 18 G Anterior;Left Forearm <1 day

## 2025-05-27 NOTE — CONSULTS
Food & Nutrition Education    Diet Education: Cardiac, Fluid restrcition diet    Learners: Patient    Nutrition Education provided with handouts:  Heart Healthy Nutrition Therapy  Fluid Restricted Nutrition Therapy  (nutritioncaremanual.org)    Comments:  PMH: Elevated troponin, Hypokalemia and Hypertensive emergency    40 y.o. male admitted for CHF Exacerbation. RD consulted for a low sodium, fluid restriction (<1500 ml) diet education. Pt is currently getting a low sodium, 1500 ml fluid restricted diet. RD intern spoke to pt at bedside. Pt reported having a low appetite with around 0-25% PO intake d/t swelling in the abdomen. PTA appetite was normal, eating 2-3 meals per day with snacks. Provided pt with a menu to help encourage pt preferred food choices. No current NVDC or ONS at this time. UBW stated as 214#, # 5/27.    RD educated patient on low sodium, general healthful diet r/t recent hospital diagnosis. Recommended a well-balanced diet with a variety of fresh foods, fruits and vegetables (5 cups/day), whole grains (3 oz/day), and fat-free or low-fat dairy. Discussed reading food packages, food labels, and nutrition facts labels to identify nutrient content of foods.    Discussed the importance of limiting sodium to less than 2,000 mg per day. Recommended salt free seasonings and other herbs and spices in meals to enhance flavor without additional sodium.    Discussed dietary sources of cholesterol, the importance of incorporating healthy fats into the diet, and avoiding saturated and trans fats for heart health. For a generally healthy diet, aim for total fat less than 25-35% of calories.  Discussed the importance of fiber (especially soluble fiber), dietary sources, and a goal intake of >20-30g/day.    Discussed 1500 ml fluid restriction per MD and dietary sources of fluid. RD recommended using a cup with measurements for fluids and to try to consume small sips spread throughout the day rather  than a lot at one time.    Pt expressed understanding and appreciation for RD and diet education material provided. Encouraged pt to read handouts and use RD contact information with any further questions/concerns that he may have.     Nutrition Related Social Determinants of Health: SDOH: Unable to afford healthy foods    Visual NFPE performed, pt appears nourished.  All questions and concerns answered.  Provided handout with dietitian's contact information.  *Please re-consult as needed.    Thank you,  Delores Mohan, Dietetic Intern  JAKY Bonds, RDN, LDN

## 2025-05-27 NOTE — CONSULTS
[facility] Tele[facility] ([facility])  Cardiology  Consult Note    Patient Name: Henry Harmon  MRN: [id number]  Admission Date: 5/27/2025  Hospital Length of Stay: 0 days  Code Status: Full Code   Attending Provider: Spenser Lyons MD   Consulting Provider: Nieves Carballo PA-C  Primary Care Physician: Catie, Primary Doctor  Principal Problem:CHF exacerbation    Patient information was obtained from patient, past medical records, and ER records.     Inpatient consult to Cardiology  Consult performed by: Nieves Carballo PA-C  Consult ordered by: Darius Schuler MD        Subjective:     Chief Complaint:  CP/SOB    HPI:   Mr. Harmon is a 40 year old male patient whose current medical conditions include HTN who presented to [facility] ED overnight due to increased SOB over the past 1-2 days. Associated symptoms included left-sided chest pressure, orthopnea, non-productive cough, and PND. He denied any associated nausea, BLE edema, vomiting, palpitations, near syncope, or syncope. Reported he had been off of BP medications for past 6 months-1 year. Initial workup in ED revealed uncontrolled BP (233/149), BNP of 236, and troponin of 0.110. CXR with pulmonary edema. Patient subsequently given IV Lasix and IV hydralazine and admitted for further evaluation and treatment. Cardiology consulted to assist with management. Patient seen and examined today, resting in bed. Feeling better, less SOB. Excellent diuresis. CP free. No piror history of CHF. Denies familial history of CAD. Chart reviewed. Troponin 0.110>0.143. TTE pending. EKG with LVH.    Past Medical History:   Diagnosis Date    Hypertension        History reviewed. No pertinent surgical history.    Review of patient's allergies indicates:   Allergen Reactions    Penicillins Rash       No current facility-administered medications on file prior to encounter.     Current Outpatient Medications on File Prior to Encounter   Medication Sig    amLODIPine (NORVASC) 10  MG tablet Take 1 tablet (10 mg total) by mouth once daily.    aspirin (ECOTRIN) 81 MG EC tablet Take 1 tablet (81 mg total) by mouth once daily.    hydroCHLOROthiazide (HYDRODIURIL) 25 MG tablet Take 1 tablet (25 mg total) by mouth once daily.    losartan (COZAAR) 50 MG tablet Take 2 tablets (100 mg total) by mouth once daily.     Family History    None       Tobacco Use    Smoking status: Never    Smokeless tobacco: Never   Vaping Use    Vaping status: Never Used   Substance and Sexual Activity    Alcohol use: Not Currently     Comment: Occasional use    Drug use: Never    Sexual activity: Not on file     Review of Systems   Constitutional: Positive for malaise/fatigue.   HENT: Negative.     Eyes: Negative.    Cardiovascular:  Positive for chest pain (resolved), dyspnea on exertion, leg swelling and orthopnea.   Respiratory: Negative.     Endocrine: Negative.    Hematologic/Lymphatic: Negative.    Skin: Negative.    Musculoskeletal: Negative.    Gastrointestinal: Negative.    Genitourinary: Negative.    Neurological: Negative.    Psychiatric/Behavioral: Negative.     Allergic/Immunologic: Negative.      Objective:     Vital Signs (Most Recent):  Temp: 97.6 °F (36.4 °C) (05/27/25 0623)  Pulse: 88 (05/27/25 1113)  Resp: 18 (05/27/25 0623)  BP: (!) 176/107 (05/27/25 0623)  SpO2: 98 % (05/27/25 0943) Vital Signs (24h Range):  Temp:  [97.6 °F (36.4 °C)-99.1 °F (37.3 °C)] 97.6 °F (36.4 °C)  Pulse:  [71-97] 88  Resp:  [15-33] 18  SpO2:  [91 %-100 %] 98 %  BP: (132-233)/() 176/107     Weight: 100 kg (220 lb 7.4 oz)  Body mass index is 36.69 kg/m².    SpO2: 98 %         Intake/Output Summary (Last 24 hours) at 5/27/2025 1236  Last data filed at 5/27/2025 0657  Gross per 24 hour   Intake --   Output 2700 ml   Net -2700 ml       Lines/Drains/Airways       Peripheral Intravenous Line  Duration                  Peripheral IV - Single Lumen 05/27/25 0205 18 G Anterior;Left Forearm <1 day                     Physical  "Exam  Vitals and nursing note reviewed.   Constitutional:       General: He is not in acute distress.     Appearance: Normal appearance. He is well-developed. He is not diaphoretic.   HENT:      Head: Normocephalic and atraumatic.   Eyes:      General:         Right eye: No discharge.         Left eye: No discharge.      Pupils: Pupils are equal, round, and reactive to light.   Cardiovascular:      Rate and Rhythm: Normal rate and regular rhythm.      Heart sounds: Normal heart sounds, S1 normal and S2 normal. No murmur heard.  Pulmonary:      Effort: Pulmonary effort is normal. No respiratory distress.      Breath sounds: Rales present.   Musculoskeletal:      Right lower leg: No edema.      Left lower leg: No edema.   Skin:     General: Skin is warm and dry.      Findings: No erythema.   Neurological:      Mental Status: He is alert and oriented to person, place, and time.   Psychiatric:         Mood and Affect: Mood normal.         Behavior: Behavior normal.         Thought Content: Thought content normal.          Significant Labs: CMP   Recent Labs   Lab 05/27/25  0205 05/27/25  0643    138   K 2.9* 3.3*    102   CO2 23 25   GLU 96 112*   BUN 13 13   CREATININE 1.0 1.0   CALCIUM 8.5* 8.7   PROT 7.2  --    ALBUMIN 3.8  --    BILITOT 0.6  --    ALKPHOS 87  --    AST 26  --    ALT 30  --    ANIONGAP 12 11   , CBC   Recent Labs   Lab 05/27/25  0205   WBC 10.43   HGB 15.7   HCT 45.8      , Troponin No results for input(s): "TROPONINIHS" in the last 48 hours., and All pertinent lab results from the last 24 hours have been reviewed.    Significant Imaging: Echocardiogram: Transthoracic echo (TTE) complete (Cupid Only):   Results for orders placed or performed during the hospital encounter of 05/27/25   Echo   Result Value Ref Range    BSA 2.14 m2    LA WIDTH 4.2 cm    RA Width 4.5 cm    A4C EF 43 %    LVOT stroke volume 93.4 cm3    LVIDd 5.8 3.5 - 6.0 cm    LV Systolic Volume 113 mL    LV Systolic " Volume Index 54.9 mL/m2    LVIDs 4.9 (A) 2.1 - 4.0 cm    LV ESV A2C 114.04 mL    LV Diastolic Volume 167 mL    LV ESV A4C 67.54 mL    LV Diastolic Volume Index 81.07 mL/m2    LV EDV A4C 134.14 mL    Left Ventricular End Systolic Volume by Teichholz Method 113.29 mL    Left Ventricular End Diastolic Volume by Teichholz Method 166.68 mL    IVS 1.3 (A) 0.6 - 1.1 cm    LVOT diameter 2.3 cm    LVOT area 4.2 cm2    FS 15.5 (A) 28 - 44 %    Left Ventricle Relative Wall Thickness 0.62 cm    PW 1.8 (A) 0.6 - 1.1 cm    LV mass 424.8 g    LV Mass Index 206.2 g/m2    MV Peak E Keon 1.00 m/s    TDI LATERAL 0.03 m/s    TDI SEPTAL 0.05 m/s    E/E' ratio 25 m/s    MV Peak A Keon 1.14 m/s    TR Max Keon 2.8 m/s    E/A ratio 0.88     IVRT 86 msec    E wave deceleration time 142 msec    LV SEPTAL E/E' RATIO 20.0 m/s    JOSH 53 mL/m2    LV LATERAL E/E' RATIO 33.3 m/s    LA Vol 108 cm3    LVOT peak keon 1.3 m/s    Left Ventricular Outflow Tract Mean Velocity 0.93 cm/s    Left Ventricular Outflow Tract Mean Gradient 3.84 mmHg    RV- caraballo basal diam 3.9 cm    RVOT peak VTI 14.7 cm    TAPSE 2.9 cm    RV/LV Ratio 0.67 cm    LA size 4.3 cm    Left Atrium Minor Axis 6.9 cm    Left Atrium Major Axis 7.2 cm    LA Vol (MOD) 88 mL    JOSH (MOD) 43 mL/m2    RA Major Axis 6.52 cm    AV regurgitation pressure 1/2 time 454 ms    AR Max Keon 5.37 m/s    AV mean gradient 11 mmHg    AV peak gradient 16 mmHg    Ao peak keon 2.0 m/s    Ao VTI 35.1 cm    LVOT peak VTI 22.5 cm    AV valve area 2.7 cm²    AV Velocity Ratio 0.65     AV index (prosthetic) 0.64     GODWIN by Velocity Ratio 2.7 cm²    MV mean gradient 4 mmHg    MV peak gradient 8 mmHg    MV stenosis pressure 1/2 time 42.72 ms    MV valve area p 1/2 method 5.15 cm2    MV valve area by continuity eq 3.27 cm2    MV VTI 28.6 cm    Triscuspid Valve Regurgitation Peak Gradient 30 mmHg    PV mean gradient 2 mmHg    PV PEAK VELOCITY 1.49 m/s    PV peak gradient 3 mmHg    Pulmonary Valve Mean Velocity 0.92 m/s     RVOT peak yandel 0.90 m/s    Ao root annulus 4.0 cm    STJ 2.6 cm    Ascending aorta 3.9 cm    ASI 1.9 cm/m2    IVC diameter 1.89 cm    Mean e' 0.04 m/s    ZLVIDS 1.99     ZLVIDD -0.70     LA area A4C 23.40 cm2    LA area A2C 29.94 cm2    and EKG: reviewed  Assessment and Plan:   Patient who presents with decompensated CHF/HTN urgency. Improving. Meds being optimized, continue IV diuresis. Elevated troponin likely due to demand, continue to trend. TTE pending. MPI stress test IP vs OP.    * CHF exacerbation  -Presents with decompensated CHF in setting of uncontrolled HTN  -Continue IV diuresis  -ARB added  -Will plan to add Coreg tmw  -TTE pending  -Strict I's/O's  -Counseled on low salt diet    Hypertensive urgency  -Due to medication non-compliance  -ARB initiated  -Diurese   -Will plan to add BB tomorrow  -TTE pending    Hypokalemia  -Being repleted    Elevated troponin  -Troponin 0.110>0.143, continue to trend  -Likely due to demand ischemia from uncontrolled HTN/CHF  -ASA  -ARB  -Statin if indicated  -BB tmw  -TTE pending; stress test IP vs OP        VTE Risk Mitigation (From admission, onward)           Ordered     enoxaparin injection 40 mg  Daily         05/27/25 0449     IP VTE HIGH RISK PATIENT  Once         05/27/25 0449     Place sequential compression device  Until discontinued         05/27/25 0449                    Thank you for your consult. I will follow-up with patient. Please contact us if you have any additional questions.    Nieves Carballo PA-C  Cardiology   [facility] - Telemetry ([facility])

## 2025-05-27 NOTE — H&P (VIEW-ONLY)
[facility] - Telemetry ([facility])  Cardiology  Consult Note    Patient Name: Henry Harmon  MRN: [id number]  Admission Date: 5/27/2025  Hospital Length of Stay: 0 days  Code Status: Full Code   Attending Provider: Spenser Lyons MD   Consulting Provider: Nieves Carballo PA-C  Primary Care Physician: Catie, Primary Doctor  Principal Problem:CHF exacerbation    Patient information was obtained from patient, past medical records, and ER records.     Inpatient consult to Cardiology  Consult performed by: Nieves Carballo PA-C  Consult ordered by: Darius Schuler MD        Subjective:     Chief Complaint:  CP/SOB    HPI:   Mr. Harmon is a 40 year old male patient whose current medical conditions include HTN who presented to [facility] ED overnight due to increased SOB over the past 1-2 days. Associated symptoms included left-sided chest pressure, orthopnea, non-productive cough, and PND. He denied any associated nausea, BLE edema, vomiting, palpitations, near syncope, or syncope. Reported he had been off of BP medications for past 6 months-1 year. Initial workup in ED revealed uncontrolled BP (233/149), BNP of 236, and troponin of 0.110. CXR with pulmonary edema. Patient subsequently given IV Lasix and IV hydralazine and admitted for further evaluation and treatment. Cardiology consulted to assist with management. Patient seen and examined today, resting in bed. Feeling better, less SOB. Excellent diuresis. CP free. No piror history of CHF. Denies familial history of CAD. Chart reviewed. Troponin 0.110>0.143. TTE pending. EKG with LVH.    Past Medical History:   Diagnosis Date    Hypertension        History reviewed. No pertinent surgical history.    Review of patient's allergies indicates:   Allergen Reactions    Penicillins Rash       No current facility-administered medications on file prior to encounter.     Current Outpatient Medications on File Prior to Encounter   Medication Sig    amLODIPine (NORVASC) 10  MG tablet Take 1 tablet (10 mg total) by mouth once daily.    aspirin (ECOTRIN) 81 MG EC tablet Take 1 tablet (81 mg total) by mouth once daily.    hydroCHLOROthiazide (HYDRODIURIL) 25 MG tablet Take 1 tablet (25 mg total) by mouth once daily.    losartan (COZAAR) 50 MG tablet Take 2 tablets (100 mg total) by mouth once daily.     Family History    None       Tobacco Use    Smoking status: Never    Smokeless tobacco: Never   Vaping Use    Vaping status: Never Used   Substance and Sexual Activity    Alcohol use: Not Currently     Comment: Occasional use    Drug use: Never    Sexual activity: Not on file     Review of Systems   Constitutional: Positive for malaise/fatigue.   HENT: Negative.     Eyes: Negative.    Cardiovascular:  Positive for chest pain (resolved), dyspnea on exertion, leg swelling and orthopnea.   Respiratory: Negative.     Endocrine: Negative.    Hematologic/Lymphatic: Negative.    Skin: Negative.    Musculoskeletal: Negative.    Gastrointestinal: Negative.    Genitourinary: Negative.    Neurological: Negative.    Psychiatric/Behavioral: Negative.     Allergic/Immunologic: Negative.      Objective:     Vital Signs (Most Recent):  Temp: 97.6 °F (36.4 °C) (05/27/25 0623)  Pulse: 88 (05/27/25 1113)  Resp: 18 (05/27/25 0623)  BP: (!) 176/107 (05/27/25 0623)  SpO2: 98 % (05/27/25 0943) Vital Signs (24h Range):  Temp:  [97.6 °F (36.4 °C)-99.1 °F (37.3 °C)] 97.6 °F (36.4 °C)  Pulse:  [71-97] 88  Resp:  [15-33] 18  SpO2:  [91 %-100 %] 98 %  BP: (132-233)/() 176/107     Weight: 100 kg (220 lb 7.4 oz)  Body mass index is 36.69 kg/m².    SpO2: 98 %         Intake/Output Summary (Last 24 hours) at 5/27/2025 1236  Last data filed at 5/27/2025 0657  Gross per 24 hour   Intake --   Output 2700 ml   Net -2700 ml       Lines/Drains/Airways       Peripheral Intravenous Line  Duration                  Peripheral IV - Single Lumen 05/27/25 0205 18 G Anterior;Left Forearm <1 day                     Physical  "Exam  Vitals and nursing note reviewed.   Constitutional:       General: He is not in acute distress.     Appearance: Normal appearance. He is well-developed. He is not diaphoretic.   HENT:      Head: Normocephalic and atraumatic.   Eyes:      General:         Right eye: No discharge.         Left eye: No discharge.      Pupils: Pupils are equal, round, and reactive to light.   Cardiovascular:      Rate and Rhythm: Normal rate and regular rhythm.      Heart sounds: Normal heart sounds, S1 normal and S2 normal. No murmur heard.  Pulmonary:      Effort: Pulmonary effort is normal. No respiratory distress.      Breath sounds: Rales present.   Musculoskeletal:      Right lower leg: No edema.      Left lower leg: No edema.   Skin:     General: Skin is warm and dry.      Findings: No erythema.   Neurological:      Mental Status: He is alert and oriented to person, place, and time.   Psychiatric:         Mood and Affect: Mood normal.         Behavior: Behavior normal.         Thought Content: Thought content normal.          Significant Labs: CMP   Recent Labs   Lab 05/27/25  0205 05/27/25  0643    138   K 2.9* 3.3*    102   CO2 23 25   GLU 96 112*   BUN 13 13   CREATININE 1.0 1.0   CALCIUM 8.5* 8.7   PROT 7.2  --    ALBUMIN 3.8  --    BILITOT 0.6  --    ALKPHOS 87  --    AST 26  --    ALT 30  --    ANIONGAP 12 11   , CBC   Recent Labs   Lab 05/27/25  0205   WBC 10.43   HGB 15.7   HCT 45.8      , Troponin No results for input(s): "TROPONINIHS" in the last 48 hours., and All pertinent lab results from the last 24 hours have been reviewed.    Significant Imaging: Echocardiogram: Transthoracic echo (TTE) complete (Cupid Only):   Results for orders placed or performed during the hospital encounter of 05/27/25   Echo   Result Value Ref Range    BSA 2.14 m2    LA WIDTH 4.2 cm    RA Width 4.5 cm    A4C EF 43 %    LVOT stroke volume 93.4 cm3    LVIDd 5.8 3.5 - 6.0 cm    LV Systolic Volume 113 mL    LV Systolic " Volume Index 54.9 mL/m2    LVIDs 4.9 (A) 2.1 - 4.0 cm    LV ESV A2C 114.04 mL    LV Diastolic Volume 167 mL    LV ESV A4C 67.54 mL    LV Diastolic Volume Index 81.07 mL/m2    LV EDV A4C 134.14 mL    Left Ventricular End Systolic Volume by Teichholz Method 113.29 mL    Left Ventricular End Diastolic Volume by Teichholz Method 166.68 mL    IVS 1.3 (A) 0.6 - 1.1 cm    LVOT diameter 2.3 cm    LVOT area 4.2 cm2    FS 15.5 (A) 28 - 44 %    Left Ventricle Relative Wall Thickness 0.62 cm    PW 1.8 (A) 0.6 - 1.1 cm    LV mass 424.8 g    LV Mass Index 206.2 g/m2    MV Peak E Keon 1.00 m/s    TDI LATERAL 0.03 m/s    TDI SEPTAL 0.05 m/s    E/E' ratio 25 m/s    MV Peak A Keon 1.14 m/s    TR Max Keon 2.8 m/s    E/A ratio 0.88     IVRT 86 msec    E wave deceleration time 142 msec    LV SEPTAL E/E' RATIO 20.0 m/s    JOSH 53 mL/m2    LV LATERAL E/E' RATIO 33.3 m/s    LA Vol 108 cm3    LVOT peak keon 1.3 m/s    Left Ventricular Outflow Tract Mean Velocity 0.93 cm/s    Left Ventricular Outflow Tract Mean Gradient 3.84 mmHg    RV- caraballo basal diam 3.9 cm    RVOT peak VTI 14.7 cm    TAPSE 2.9 cm    RV/LV Ratio 0.67 cm    LA size 4.3 cm    Left Atrium Minor Axis 6.9 cm    Left Atrium Major Axis 7.2 cm    LA Vol (MOD) 88 mL    JOSH (MOD) 43 mL/m2    RA Major Axis 6.52 cm    AV regurgitation pressure 1/2 time 454 ms    AR Max Keon 5.37 m/s    AV mean gradient 11 mmHg    AV peak gradient 16 mmHg    Ao peak keon 2.0 m/s    Ao VTI 35.1 cm    LVOT peak VTI 22.5 cm    AV valve area 2.7 cm²    AV Velocity Ratio 0.65     AV index (prosthetic) 0.64     GODWIN by Velocity Ratio 2.7 cm²    MV mean gradient 4 mmHg    MV peak gradient 8 mmHg    MV stenosis pressure 1/2 time 42.72 ms    MV valve area p 1/2 method 5.15 cm2    MV valve area by continuity eq 3.27 cm2    MV VTI 28.6 cm    Triscuspid Valve Regurgitation Peak Gradient 30 mmHg    PV mean gradient 2 mmHg    PV PEAK VELOCITY 1.49 m/s    PV peak gradient 3 mmHg    Pulmonary Valve Mean Velocity 0.92 m/s     RVOT peak yandel 0.90 m/s    Ao root annulus 4.0 cm    STJ 2.6 cm    Ascending aorta 3.9 cm    ASI 1.9 cm/m2    IVC diameter 1.89 cm    Mean e' 0.04 m/s    ZLVIDS 1.99     ZLVIDD -0.70     LA area A4C 23.40 cm2    LA area A2C 29.94 cm2    and EKG: reviewed  Assessment and Plan:   Patient who presents with decompensated CHF/HTN urgency. Improving. Meds being optimized, continue IV diuresis. Elevated troponin likely due to demand, continue to trend. TTE pending. MPI stress test IP vs OP.    * CHF exacerbation  -Presents with decompensated CHF in setting of uncontrolled HTN  -Continue IV diuresis  -ARB added  -Will plan to add Coreg tmw  -TTE pending  -Strict I's/O's  -Counseled on low salt diet    Hypertensive urgency  -Due to medication non-compliance  -ARB initiated  -Diurese   -Will plan to add BB tomorrow  -TTE pending    Hypokalemia  -Being repleted    Elevated troponin  -Troponin 0.110>0.143, continue to trend  -Likely due to demand ischemia from uncontrolled HTN/CHF  -ASA  -ARB  -Statin if indicated  -BB tmw  -TTE pending; stress test IP vs OP        VTE Risk Mitigation (From admission, onward)           Ordered     enoxaparin injection 40 mg  Daily         05/27/25 0449     IP VTE HIGH RISK PATIENT  Once         05/27/25 0449     Place sequential compression device  Until discontinued         05/27/25 0449                    Thank you for your consult. I will follow-up with patient. Please contact us if you have any additional questions.    Nieves Carballo PA-C  Cardiology   [facility] - Telemetry ([facility])

## 2025-05-27 NOTE — HPI
Mr. Harmon is a 40 year old male patient whose current medical conditions include HTN who presented to [facility] ED overnight due to increased SOB over the past 1-2 days. Associated symptoms included left-sided chest pressure, orthopnea, non-productive cough, and PND. He denied any associated nausea, BLE edema, vomiting, palpitations, near syncope, or syncope. Reported he had been off of BP medications for past 6 months-1 year. Initial workup in ED revealed uncontrolled BP (233/149), BNP of 236, and troponin of 0.110. CXR with pulmonary edema. Patient subsequently given IV Lasix and IV hydralazine and admitted for further evaluation and treatment. Cardiology consulted to assist with management. Patient seen and examined today, resting in bed. Feeling better, less SOB. Excellent diuresis. CP free. No piror history of CHF. Denies familial history of CAD. Chart reviewed. Troponin 0.110>0.143. TTE pending. EKG with LVH.

## 2025-05-27 NOTE — ASSESSMENT & PLAN NOTE
Patient's most recent potassium results are listed below.   Recent Labs     05/27/25  0205   K 2.9*     Plan  - Replete potassium per protocol  - Monitor potassium Daily  - Patient's hypokalemia is being treated

## 2025-05-27 NOTE — SUBJECTIVE & OBJECTIVE
Past Medical History:   Diagnosis Date    Hypertension        History reviewed. No pertinent surgical history.    Review of patient's allergies indicates:   Allergen Reactions    Penicillins Rash       No current facility-administered medications on file prior to encounter.     Current Outpatient Medications on File Prior to Encounter   Medication Sig    amLODIPine (NORVASC) 10 MG tablet Take 1 tablet (10 mg total) by mouth once daily.    aspirin (ECOTRIN) 81 MG EC tablet Take 1 tablet (81 mg total) by mouth once daily.    hydroCHLOROthiazide (HYDRODIURIL) 25 MG tablet Take 1 tablet (25 mg total) by mouth once daily.    losartan (COZAAR) 50 MG tablet Take 2 tablets (100 mg total) by mouth once daily.     Family History    None       Tobacco Use    Smoking status: Never    Smokeless tobacco: Never   Vaping Use    Vaping status: Never Used   Substance and Sexual Activity    Alcohol use: Not Currently     Comment: Occasional use    Drug use: Never    Sexual activity: Not on file     Review of Systems   Constitutional:  Negative for activity change, appetite change, chills, diaphoresis and fatigue.   Respiratory:  Positive for cough and shortness of breath.    Cardiovascular:  Positive for palpitations. Negative for chest pain and leg swelling.   Gastrointestinal:  Negative for abdominal pain, diarrhea, nausea and vomiting.   Genitourinary:  Negative for dysuria, flank pain and hematuria.   Neurological:  Positive for headaches. Negative for dizziness and light-headedness.   All other systems reviewed and are negative.    Objective:     Vital Signs (Most Recent):  Temp: 99.1 °F (37.3 °C) (05/27/25 0146)  Pulse: 97 (05/27/25 0600)  Resp: (!) 33 (05/27/25 0600)  BP: (!) 169/91 (05/27/25 0600)  SpO2: 100 % (05/27/25 0600) Vital Signs (24h Range):  Temp:  [99.1 °F (37.3 °C)] 99.1 °F (37.3 °C)  Pulse:  [71-97] 97  Resp:  [15-33] 33  SpO2:  [91 %-100 %] 100 %  BP: (132-233)/() 169/91     Weight: 100 kg (220 lb 7.4  oz)  Body mass index is 36.69 kg/m².     Physical Exam  Vitals and nursing note reviewed.   Constitutional:       General: He is awake. He is not in acute distress.     Appearance: Normal appearance. He is well-developed and well-groomed. He is not ill-appearing, toxic-appearing or diaphoretic.   HENT:      Head: Normocephalic and atraumatic.      Mouth/Throat:      Mouth: Mucous membranes are moist.      Pharynx: Oropharynx is clear.   Eyes:      Extraocular Movements: Extraocular movements intact.      Conjunctiva/sclera: Conjunctivae normal.      Pupils: Pupils are equal, round, and reactive to light.   Cardiovascular:      Rate and Rhythm: Normal rate and regular rhythm.      Pulses: Normal pulses.      Heart sounds: Normal heart sounds. No murmur heard.  Pulmonary:      Effort: Pulmonary effort is normal.      Breath sounds: Rales present.   Abdominal:      General: Bowel sounds are normal.      Palpations: Abdomen is soft.      Tenderness: There is no abdominal tenderness. There is no right CVA tenderness, left CVA tenderness, guarding or rebound.   Musculoskeletal:      Cervical back: Normal range of motion and neck supple.      Right lower leg: No edema.      Left lower leg: No edema.      Comments: 5/5 strength throughout   Skin:     General: Skin is warm and dry.      Capillary Refill: Capillary refill takes less than 2 seconds.   Neurological:      General: No focal deficit present.      Mental Status: He is alert and oriented to person, place, and time. Mental status is at baseline.      GCS: GCS eye subscore is 4. GCS verbal subscore is 5. GCS motor subscore is 6.      Cranial Nerves: Cranial nerves 2-12 are intact.      Sensory: Sensation is intact.      Motor: Motor function is intact.      Deep Tendon Reflexes: Reflexes are normal and symmetric.   Psychiatric:         Mood and Affect: Mood normal.         Behavior: Behavior normal. Behavior is cooperative.              CRANIAL NERVES     CN III, IV,  VI   Pupils are equal, round, and reactive to light.     LABS:  Recent Results (from the past 24 hours)   Hepatitis C Antibody    Collection Time: 05/27/25  2:05 AM   Result Value Ref Range    Hep C Ab Interp Negative Negative   HCV Virus Hold Specimen    Collection Time: 05/27/25  2:05 AM   Result Value Ref Range    Extra Tube Hold for add-ons.    HIV 1/2 Ag/Ab (4th Gen)    Collection Time: 05/27/25  2:05 AM   Result Value Ref Range    HIV 1/2 Ag/Ab Negative Negative   Comprehensive metabolic panel    Collection Time: 05/27/25  2:05 AM   Result Value Ref Range    Sodium 138 136 - 145 mmol/L    Potassium 2.9 (L) 3.5 - 5.1 mmol/L    Chloride 103 95 - 110 mmol/L    CO2 23 23 - 29 mmol/L    Glucose 96 70 - 110 mg/dL    BUN 13 6 - 20 mg/dL    Creatinine 1.0 0.5 - 1.4 mg/dL    Calcium 8.5 (L) 8.7 - 10.5 mg/dL    Protein Total 7.2 6.0 - 8.4 gm/dL    Albumin 3.8 3.5 - 5.2 g/dL    Bilirubin Total 0.6 0.1 - 1.0 mg/dL    ALP 87 40 - 150 unit/L    AST 26 11 - 45 unit/L    ALT 30 10 - 44 unit/L    Anion Gap 12 8 - 16 mmol/L    eGFR >60 >60 mL/min/1.73/m2   Troponin I    Collection Time: 05/27/25  2:05 AM   Result Value Ref Range    Troponin-I 0.110 (H) <=0.026 ng/mL   Brain natriuretic peptide    Collection Time: 05/27/25  2:05 AM   Result Value Ref Range     (H) 0 - 99 pg/mL   Lactic Acid, Plasma    Collection Time: 05/27/25  2:05 AM   Result Value Ref Range    Lactic Acid Level 1.1 0.5 - 2.2 mmol/L   CBC with Differential    Collection Time: 05/27/25  2:05 AM   Result Value Ref Range    WBC 10.43 3.90 - 12.70 K/uL    RBC 5.21 4.60 - 6.20 M/uL    HGB 15.7 14.0 - 18.0 gm/dL    HCT 45.8 40.0 - 54.0 %    MCV 88 82 - 98 fL    MCH 30.1 27.0 - 31.0 pg    MCHC 34.3 32.0 - 36.0 g/dL    RDW 13.6 11.5 - 14.5 %    Platelet Count 233 150 - 450 K/uL    MPV 9.8 9.2 - 12.9 fL    Nucleated RBC 0 <=0 /100 WBC    Neut % 58.2 38 - 73 %    Lymph % 29.5 18 - 48 %    Mono % 7.3 4 - 15 %    Eos % 3.5 <=8 %    Basophil % 0.7 <=1.9 %    Imm  Grans % 0.8 (H) 0.0 - 0.5 %    Neut # 6.07 1.8 - 7.7 K/uL    Lymph # 3.08 1 - 4.8 K/uL    Mono # 0.76 0.3 - 1 K/uL    Eos # 0.37 <=0.5 K/uL    Baso # 0.07 <=0.2 K/uL    Imm Grans # 0.08 (H) 0.00 - 0.04 K/uL   TSH    Collection Time: 05/27/25  2:05 AM   Result Value Ref Range    TSH 4.238 (H) 0.400 - 4.000 uIU/mL    Free T4 0.99 0.71 - 1.51 ng/dL   Light Blue Top Hold    Collection Time: 05/27/25  2:05 AM   Result Value Ref Range    Extra Tube Hold for add-ons.    Light Green Top Hold    Collection Time: 05/27/25  2:05 AM   Result Value Ref Range    Extra Tube Hold for add-ons.    Magnesium    Collection Time: 05/27/25  2:05 AM   Result Value Ref Range    Magnesium  1.8 1.6 - 2.6 mg/dL   COVID-19 Rapid Screening    Collection Time: 05/27/25  2:12 AM   Result Value Ref Range    SARS COV-2 Molecular Negative Negative   Influenza A & B by Molecular    Collection Time: 05/27/25  2:12 AM    Specimen: Nasal Swab   Result Value Ref Range    INFLUENZA A MOLECULAR Negative Negative    INFLUENZA B MOLECULAR  Negative Negative   Drug screen panel, emergency    Collection Time: 05/27/25  2:45 AM   Result Value Ref Range    Benzodiazepine, Urine Negative Negative    Methadone, Urine Negative Negative    Cocaine, Urine Negative Negative    Opiates, Urine Negative Negative    Barbiturates, Urine Negative Negative    Amphetamines, Urine Negative Negative    THC Negative Negative    Phencyclidine, Urine Negative Negative    Urine Creatinine 31.4 23.0 - 375.0 mg/dL   Urinalysis, Reflex to Urine Culture Urine, Clean Catch    Collection Time: 05/27/25  2:45 AM    Specimen: Urine, Clean Catch   Result Value Ref Range    Color, UA Colorless (A) Straw, Roxy, Yellow, Light-Orange    Appearance, UA Clear Clear    pH, UA 7.0 5.0 - 8.0    Spec Grav UA 1.010 1.005 - 1.030    Protein, UA 1+ (A) Negative    Glucose, UA Negative Negative    Ketones, UA Negative Negative    Bilirubin, UA Negative Negative    Blood, UA Negative Negative     Nitrites, UA Negative Negative    Urobilinogen, UA Negative <2.0 EU/dL    Leukocyte Esterase, UA Negative Negative   GREY TOP URINE HOLD    Collection Time: 05/27/25  2:45 AM   Result Value Ref Range    Extra Tube Hold for add-ons.    Urinalysis Microscopic    Collection Time: 05/27/25  2:45 AM   Result Value Ref Range    RBC, UA 0 0 - 4 /HPF    WBC, UA 0 0 - 5 /HPF    WBC Clumps, UA None None, Rare    Bacteria, UA None None, Rare, Occasional /HPF    Yeast, UA None None /HPF    Squamous Epithelial Cells, UA 0 /HPF    Non-Squamous Epithelial Cells 0 <=0 /HPF    Hyaline Casts, UA 1 0 - 1 /LPF    Epithelial Casts 0 None /lpf    WBC Casts 0 None /LPF    RBC Casts 0 None /LPF    Granular Casts 0 None  /LPF    Fatty Casts, UA 0 None /LPF    Waxy Casts, UA 0 None /lpf    Triple Phosphate Crystals, UA None None, Rare, Occasional, Few, Moderate    Calcium Oxalate Crystals, UA None None, Rare, Occasional, Few, Moderate    Calcium Phosphate Crystal None Rare, None, Moderate, Few, Occasional    Calcium Carbonate Crystal None Occasional, Rare, Few, None, Moderate    Ammonium Urate Crystals None Moderate, Occasional, Rare, Few, None    Uric Acid Crystals, UA None None, Rare, Occasional, Few, Moderate    Amorphous, UA None None, Occasional, Few, Moderate, Rare    Unclassified Crystals None None, Rare, Occasional, Few, Moderate    Trichomonas, UA None None /HPF    Other, UA None None    Microscopic Comment         RADIOLOGY  No results found.    EKG    MICROBIOLOGY    MDM     Amount and/or Complexity of Data Reviewed  Clinical lab tests: reviewed  Tests in the radiology section of CPT®: reviewed  Tests in the medicine section of CPT®: reviewed  Discussion of test results with the performing providers: yes  Decide to obtain previous medical records or to obtain history from someone other than the patient: yes  Obtain history from someone other than the patient: yes  Review and summarize past medical records: yes  Discuss the patient  with other providers: yes  Independent visualization of images, tracings, or specimens: yes

## 2025-05-27 NOTE — SUBJECTIVE & OBJECTIVE
Past Medical History:   Diagnosis Date    Hypertension        History reviewed. No pertinent surgical history.    Review of patient's allergies indicates:   Allergen Reactions    Penicillins Rash       No current facility-administered medications on file prior to encounter.     Current Outpatient Medications on File Prior to Encounter   Medication Sig    amLODIPine (NORVASC) 10 MG tablet Take 1 tablet (10 mg total) by mouth once daily.    aspirin (ECOTRIN) 81 MG EC tablet Take 1 tablet (81 mg total) by mouth once daily.    hydroCHLOROthiazide (HYDRODIURIL) 25 MG tablet Take 1 tablet (25 mg total) by mouth once daily.    losartan (COZAAR) 50 MG tablet Take 2 tablets (100 mg total) by mouth once daily.     Family History    None       Tobacco Use    Smoking status: Never    Smokeless tobacco: Never   Vaping Use    Vaping status: Never Used   Substance and Sexual Activity    Alcohol use: Not Currently     Comment: Occasional use    Drug use: Never    Sexual activity: Not on file     Review of Systems   Constitutional: Positive for malaise/fatigue.   HENT: Negative.     Eyes: Negative.    Cardiovascular:  Positive for chest pain (resolved), dyspnea on exertion, leg swelling and orthopnea.   Respiratory: Negative.     Endocrine: Negative.    Hematologic/Lymphatic: Negative.    Skin: Negative.    Musculoskeletal: Negative.    Gastrointestinal: Negative.    Genitourinary: Negative.    Neurological: Negative.    Psychiatric/Behavioral: Negative.     Allergic/Immunologic: Negative.      Objective:     Vital Signs (Most Recent):  Temp: 97.6 °F (36.4 °C) (05/27/25 0623)  Pulse: 88 (05/27/25 1113)  Resp: 18 (05/27/25 0623)  BP: (!) 176/107 (05/27/25 0623)  SpO2: 98 % (05/27/25 0943) Vital Signs (24h Range):  Temp:  [97.6 °F (36.4 °C)-99.1 °F (37.3 °C)] 97.6 °F (36.4 °C)  Pulse:  [71-97] 88  Resp:  [15-33] 18  SpO2:  [91 %-100 %] 98 %  BP: (132-233)/() 176/107     Weight: 100 kg (220 lb 7.4 oz)  Body mass index is 36.69  "kg/m².    SpO2: 98 %         Intake/Output Summary (Last 24 hours) at 5/27/2025 1236  Last data filed at 5/27/2025 0657  Gross per 24 hour   Intake --   Output 2700 ml   Net -2700 ml       Lines/Drains/Airways       Peripheral Intravenous Line  Duration                  Peripheral IV - Single Lumen 05/27/25 0205 18 G Anterior;Left Forearm <1 day                     Physical Exam  Vitals and nursing note reviewed.   Constitutional:       General: He is not in acute distress.     Appearance: Normal appearance. He is well-developed. He is not diaphoretic.   HENT:      Head: Normocephalic and atraumatic.   Eyes:      General:         Right eye: No discharge.         Left eye: No discharge.      Pupils: Pupils are equal, round, and reactive to light.   Cardiovascular:      Rate and Rhythm: Normal rate and regular rhythm.      Heart sounds: Normal heart sounds, S1 normal and S2 normal. No murmur heard.  Pulmonary:      Effort: Pulmonary effort is normal. No respiratory distress.      Breath sounds: Rales present.   Musculoskeletal:      Right lower leg: No edema.      Left lower leg: No edema.   Skin:     General: Skin is warm and dry.      Findings: No erythema.   Neurological:      Mental Status: He is alert and oriented to person, place, and time.   Psychiatric:         Mood and Affect: Mood normal.         Behavior: Behavior normal.         Thought Content: Thought content normal.          Significant Labs: CMP   Recent Labs   Lab 05/27/25  0205 05/27/25  0643    138   K 2.9* 3.3*    102   CO2 23 25   GLU 96 112*   BUN 13 13   CREATININE 1.0 1.0   CALCIUM 8.5* 8.7   PROT 7.2  --    ALBUMIN 3.8  --    BILITOT 0.6  --    ALKPHOS 87  --    AST 26  --    ALT 30  --    ANIONGAP 12 11   , CBC   Recent Labs   Lab 05/27/25  0205   WBC 10.43   HGB 15.7   HCT 45.8      , Troponin No results for input(s): "TROPONINIHS" in the last 48 hours., and All pertinent lab results from the last 24 hours have been " reviewed.    Significant Imaging: Echocardiogram: Transthoracic echo (TTE) complete (Cupid Only):   Results for orders placed or performed during the hospital encounter of 05/27/25   Echo   Result Value Ref Range    BSA 2.14 m2    LA WIDTH 4.2 cm    RA Width 4.5 cm    A4C EF 43 %    LVOT stroke volume 93.4 cm3    LVIDd 5.8 3.5 - 6.0 cm    LV Systolic Volume 113 mL    LV Systolic Volume Index 54.9 mL/m2    LVIDs 4.9 (A) 2.1 - 4.0 cm    LV ESV A2C 114.04 mL    LV Diastolic Volume 167 mL    LV ESV A4C 67.54 mL    LV Diastolic Volume Index 81.07 mL/m2    LV EDV A4C 134.14 mL    Left Ventricular End Systolic Volume by Teichholz Method 113.29 mL    Left Ventricular End Diastolic Volume by Teichholz Method 166.68 mL    IVS 1.3 (A) 0.6 - 1.1 cm    LVOT diameter 2.3 cm    LVOT area 4.2 cm2    FS 15.5 (A) 28 - 44 %    Left Ventricle Relative Wall Thickness 0.62 cm    PW 1.8 (A) 0.6 - 1.1 cm    LV mass 424.8 g    LV Mass Index 206.2 g/m2    MV Peak E Keon 1.00 m/s    TDI LATERAL 0.03 m/s    TDI SEPTAL 0.05 m/s    E/E' ratio 25 m/s    MV Peak A Keon 1.14 m/s    TR Max Keon 2.8 m/s    E/A ratio 0.88     IVRT 86 msec    E wave deceleration time 142 msec    LV SEPTAL E/E' RATIO 20.0 m/s    JOSH 53 mL/m2    LV LATERAL E/E' RATIO 33.3 m/s    LA Vol 108 cm3    LVOT peak keon 1.3 m/s    Left Ventricular Outflow Tract Mean Velocity 0.93 cm/s    Left Ventricular Outflow Tract Mean Gradient 3.84 mmHg    RV- caraballo basal diam 3.9 cm    RVOT peak VTI 14.7 cm    TAPSE 2.9 cm    RV/LV Ratio 0.67 cm    LA size 4.3 cm    Left Atrium Minor Axis 6.9 cm    Left Atrium Major Axis 7.2 cm    LA Vol (MOD) 88 mL    JOSH (MOD) 43 mL/m2    RA Major Axis 6.52 cm    AV regurgitation pressure 1/2 time 454 ms    AR Max Keon 5.37 m/s    AV mean gradient 11 mmHg    AV peak gradient 16 mmHg    Ao peak keon 2.0 m/s    Ao VTI 35.1 cm    LVOT peak VTI 22.5 cm    AV valve area 2.7 cm²    AV Velocity Ratio 0.65     AV index (prosthetic) 0.64     GODWIN by Velocity Ratio 2.7  cm²    MV mean gradient 4 mmHg    MV peak gradient 8 mmHg    MV stenosis pressure 1/2 time 42.72 ms    MV valve area p 1/2 method 5.15 cm2    MV valve area by continuity eq 3.27 cm2    MV VTI 28.6 cm    Triscuspid Valve Regurgitation Peak Gradient 30 mmHg    PV mean gradient 2 mmHg    PV PEAK VELOCITY 1.49 m/s    PV peak gradient 3 mmHg    Pulmonary Valve Mean Velocity 0.92 m/s    RVOT peak yandel 0.90 m/s    Ao root annulus 4.0 cm    STJ 2.6 cm    Ascending aorta 3.9 cm    ASI 1.9 cm/m2    IVC diameter 1.89 cm    Mean e' 0.04 m/s    ZLVIDS 1.99     ZLVIDD -0.70     LA area A4C 23.40 cm2    LA area A2C 29.94 cm2    and EKG: reviewed

## 2025-05-27 NOTE — ASSESSMENT & PLAN NOTE
Patient has a current diagnosis of hypertensive urgency (without evidence of end organ damage) which is controlled.  Latest blood pressure and vitals reviewed-   Temp:  [99.1 °F (37.3 °C)]   Pulse:  [71-97]   Resp:  [15-33]   BP: (132-233)/()   SpO2:  [91 %-100 %] .   Patient currently off IV antihypertensives.   Home meds for hypertension were reviewed and noted below.   Hypertension Medications              amLODIPine (NORVASC) 10 MG tablet Take 1 tablet (10 mg total) by mouth once daily.    hydroCHLOROthiazide (HYDRODIURIL) 25 MG tablet Take 1 tablet (25 mg total) by mouth once daily.    losartan (COZAAR) 50 MG tablet Take 2 tablets (100 mg total) by mouth once daily.            Medication adjustment for hospital antihypertensives is as follows- resume HTN meds and utilize prn hydralazine     Will aim for controlled BP reduction by medications noted above. Monitor and mitigate end organ damage as indicated.

## 2025-05-28 LAB
ANION GAP (OHS): 11 MMOL/L (ref 8–16)
BUN SERPL-MCNC: 22 MG/DL (ref 6–20)
CALCIUM SERPL-MCNC: 8.8 MG/DL (ref 8.7–10.5)
CHLORIDE SERPL-SCNC: 100 MMOL/L (ref 95–110)
CO2 SERPL-SCNC: 28 MMOL/L (ref 23–29)
CREAT SERPL-MCNC: 1.4 MG/DL (ref 0.5–1.4)
GFR SERPLBLD CREATININE-BSD FMLA CKD-EPI: >60 ML/MIN/1.73/M2
GLUCOSE SERPL-MCNC: 91 MG/DL (ref 70–110)
OHS QRS DURATION: 98 MS
OHS QTC CALCULATION: 490 MS
POTASSIUM SERPL-SCNC: 2.9 MMOL/L (ref 3.5–5.1)
SODIUM SERPL-SCNC: 139 MMOL/L (ref 136–145)
TROPONIN I SERPL DL<=0.01 NG/ML-MCNC: 0.07 NG/ML

## 2025-05-28 PROCEDURE — 80048 BASIC METABOLIC PNL TOTAL CA: CPT | Performed by: NURSE PRACTITIONER

## 2025-05-28 PROCEDURE — 21400001 HC TELEMETRY ROOM

## 2025-05-28 PROCEDURE — 36415 COLL VENOUS BLD VENIPUNCTURE: CPT | Performed by: PHYSICIAN ASSISTANT

## 2025-05-28 PROCEDURE — 63600175 PHARM REV CODE 636 W HCPCS: Performed by: HOSPITALIST

## 2025-05-28 PROCEDURE — 25000003 PHARM REV CODE 250

## 2025-05-28 PROCEDURE — 25000003 PHARM REV CODE 250: Performed by: PHYSICIAN ASSISTANT

## 2025-05-28 PROCEDURE — 36415 COLL VENOUS BLD VENIPUNCTURE: CPT | Performed by: NURSE PRACTITIONER

## 2025-05-28 PROCEDURE — 99233 SBSQ HOSP IP/OBS HIGH 50: CPT | Mod: ,,, | Performed by: PHYSICIAN ASSISTANT

## 2025-05-28 PROCEDURE — 84484 ASSAY OF TROPONIN QUANT: CPT | Performed by: PHYSICIAN ASSISTANT

## 2025-05-28 RX ORDER — CARVEDILOL 3.12 MG/1
3.12 TABLET ORAL 2 TIMES DAILY
Status: DISCONTINUED | OUTPATIENT
Start: 2025-05-28 | End: 2025-05-29

## 2025-05-28 RX ORDER — POTASSIUM CHLORIDE 20 MEQ/1
40 TABLET, EXTENDED RELEASE ORAL ONCE
Status: DISCONTINUED | OUTPATIENT
Start: 2025-05-28 | End: 2025-05-28

## 2025-05-28 RX ORDER — KETOROLAC TROMETHAMINE 30 MG/ML
15 INJECTION, SOLUTION INTRAMUSCULAR; INTRAVENOUS ONCE
Status: DISCONTINUED | OUTPATIENT
Start: 2025-05-28 | End: 2025-05-30 | Stop reason: HOSPADM

## 2025-05-28 RX ORDER — POTASSIUM CHLORIDE 20 MEQ/1
40 TABLET, EXTENDED RELEASE ORAL DAILY
Status: DISCONTINUED | OUTPATIENT
Start: 2025-05-28 | End: 2025-05-30 | Stop reason: HOSPADM

## 2025-05-28 RX ADMIN — POTASSIUM CHLORIDE 40 MEQ: 1500 TABLET, EXTENDED RELEASE ORAL at 04:05

## 2025-05-28 RX ADMIN — CARVEDILOL 3.12 MG: 3.12 TABLET, FILM COATED ORAL at 09:05

## 2025-05-28 RX ADMIN — LOSARTAN POTASSIUM 100 MG: 50 TABLET, FILM COATED ORAL at 09:05

## 2025-05-28 RX ADMIN — ENOXAPARIN SODIUM 40 MG: 40 INJECTION SUBCUTANEOUS at 04:05

## 2025-05-28 RX ADMIN — CARVEDILOL 3.12 MG: 3.12 TABLET, FILM COATED ORAL at 08:05

## 2025-05-28 NOTE — ASSESSMENT & PLAN NOTE
-Due to medication non-compliance  -ARB initiated  -Johne   -Will plan to add BB tomorrow  -TTE pending    5/28/2025  -BP improved  -Continue ARB  -Coreg 3.125 mg BID added  -TTE with EF of 45%  -MPI stress test pending

## 2025-05-28 NOTE — SUBJECTIVE & OBJECTIVE
Interval History: Reports much improvement in symptoms today.     Review of Systems   Constitutional:  Negative for activity change, diaphoresis and fatigue.   HENT:  Negative for congestion.    Eyes: Negative.    Respiratory: Negative.  Negative for cough.    Cardiovascular:  Negative for chest pain, palpitations and leg swelling.   Gastrointestinal:  Negative for abdominal distention and constipation.   Genitourinary:  Negative for difficulty urinating.   Neurological:  Negative for dizziness, weakness and numbness.   Psychiatric/Behavioral:  Negative for agitation, behavioral problems and confusion.      Objective:     Vital Signs (Most Recent):  Temp: 97.9 °F (36.6 °C) (05/28/25 0832)  Pulse: 67 (05/28/25 0832)  Resp: 16 (05/28/25 0832)  BP: (!) 153/87 (05/28/25 0832)  SpO2: 95 % (05/28/25 0832) Vital Signs (24h Range):  Temp:  [97.6 °F (36.4 °C)-98.2 °F (36.8 °C)] 97.9 °F (36.6 °C)  Pulse:  [57-88] 67  Resp:  [16-20] 16  SpO2:  [94 %-98 %] 95 %  BP: (144-164)/() 153/87     Weight: 94.8 kg (208 lb 15.9 oz)  Body mass index is 34.78 kg/m².    Intake/Output Summary (Last 24 hours) at 5/28/2025 1103  Last data filed at 5/28/2025 0600  Gross per 24 hour   Intake 240 ml   Output 2300 ml   Net -2060 ml         Physical Exam  Constitutional:       Appearance: Normal appearance.   HENT:      Right Ear: Tympanic membrane normal.      Mouth/Throat:      Mouth: Mucous membranes are moist.      Pharynx: Oropharynx is clear.   Eyes:      Pupils: Pupils are equal, round, and reactive to light.   Cardiovascular:      Rate and Rhythm: Normal rate and regular rhythm.      Pulses: Normal pulses.   Musculoskeletal:         General: Normal range of motion.      Cervical back: Normal range of motion.      Right lower leg: No edema.      Left lower leg: No edema.   Skin:     General: Skin is warm and dry.      Capillary Refill: Capillary refill takes less than 2 seconds.   Neurological:      General: No focal deficit present.       Mental Status: He is alert and oriented to person, place, and time.               Significant Labs:   Results for orders placed or performed during the hospital encounter of 05/27/25   EKG 12-lead    Collection Time: 05/27/25  1:53 AM   Result Value Ref Range    QRS Duration 98 ms    [facility] QTC Calculation 490 ms   Blood culture #1 **CANNOT BE ORDERED STAT**    Collection Time: 05/27/25  2:05 AM    Specimen: Peripheral, Forearm, Left; Blood   Result Value Ref Range    Blood Culture No Growth After 6 Hours    Hepatitis C Antibody    Collection Time: 05/27/25  2:05 AM   Result Value Ref Range    Hep C Ab Interp Negative Negative   HCV Virus Hold Specimen    Collection Time: 05/27/25  2:05 AM   Result Value Ref Range    Extra Tube Hold for add-ons.    HIV 1/2 Ag/Ab (4th Gen)    Collection Time: 05/27/25  2:05 AM   Result Value Ref Range    HIV 1/2 Ag/Ab Negative Negative   Comprehensive metabolic panel    Collection Time: 05/27/25  2:05 AM   Result Value Ref Range    Sodium 138 136 - 145 mmol/L    Potassium 2.9 (L) 3.5 - 5.1 mmol/L    Chloride 103 95 - 110 mmol/L    CO2 23 23 - 29 mmol/L    Glucose 96 70 - 110 mg/dL    BUN 13 6 - 20 mg/dL    Creatinine 1.0 0.5 - 1.4 mg/dL    Calcium 8.5 (L) 8.7 - 10.5 mg/dL    Protein Total 7.2 6.0 - 8.4 gm/dL    Albumin 3.8 3.5 - 5.2 g/dL    Bilirubin Total 0.6 0.1 - 1.0 mg/dL    ALP 87 40 - 150 unit/L    AST 26 11 - 45 unit/L    ALT 30 10 - 44 unit/L    Anion Gap 12 8 - 16 mmol/L    eGFR >60 >60 mL/min/1.73/m2   Troponin I    Collection Time: 05/27/25  2:05 AM   Result Value Ref Range    Troponin-I 0.110 (H) <=0.026 ng/mL   Brain natriuretic peptide    Collection Time: 05/27/25  2:05 AM   Result Value Ref Range     (H) 0 - 99 pg/mL   Lactic Acid, Plasma    Collection Time: 05/27/25  2:05 AM   Result Value Ref Range    Lactic Acid Level 1.1 0.5 - 2.2 mmol/L   CBC with Differential    Collection Time: 05/27/25  2:05 AM   Result Value Ref Range    WBC 10.43 3.90 - 12.70 K/uL     RBC 5.21 4.60 - 6.20 M/uL    HGB 15.7 14.0 - 18.0 gm/dL    HCT 45.8 40.0 - 54.0 %    MCV 88 82 - 98 fL    MCH 30.1 27.0 - 31.0 pg    MCHC 34.3 32.0 - 36.0 g/dL    RDW 13.6 11.5 - 14.5 %    Platelet Count 233 150 - 450 K/uL    MPV 9.8 9.2 - 12.9 fL    Nucleated RBC 0 <=0 /100 WBC    Neut % 58.2 38 - 73 %    Lymph % 29.5 18 - 48 %    Mono % 7.3 4 - 15 %    Eos % 3.5 <=8 %    Basophil % 0.7 <=1.9 %    Imm Grans % 0.8 (H) 0.0 - 0.5 %    Neut # 6.07 1.8 - 7.7 K/uL    Lymph # 3.08 1 - 4.8 K/uL    Mono # 0.76 0.3 - 1 K/uL    Eos # 0.37 <=0.5 K/uL    Baso # 0.07 <=0.2 K/uL    Imm Grans # 0.08 (H) 0.00 - 0.04 K/uL   TSH    Collection Time: 05/27/25  2:05 AM   Result Value Ref Range    TSH 4.238 (H) 0.400 - 4.000 uIU/mL    Free T4 0.99 0.71 - 1.51 ng/dL   Light Blue Top Hold    Collection Time: 05/27/25  2:05 AM   Result Value Ref Range    Extra Tube Hold for add-ons.    Light Green Top Hold    Collection Time: 05/27/25  2:05 AM   Result Value Ref Range    Extra Tube Hold for add-ons.    Magnesium    Collection Time: 05/27/25  2:05 AM   Result Value Ref Range    Magnesium  1.8 1.6 - 2.6 mg/dL   Blood culture #2 **CANNOT BE ORDERED STAT**    Collection Time: 05/27/25  2:08 AM    Specimen: Peripheral, Forearm, Right; Blood   Result Value Ref Range    Blood Culture No Growth After 6 Hours    Influenza A & B by Molecular    Collection Time: 05/27/25  2:12 AM    Specimen: Nasal Swab   Result Value Ref Range    INFLUENZA A MOLECULAR Negative Negative    INFLUENZA B MOLECULAR  Negative Negative   COVID-19 Rapid Screening    Collection Time: 05/27/25  2:12 AM   Result Value Ref Range    SARS COV-2 Molecular Negative Negative   Drug screen panel, emergency    Collection Time: 05/27/25  2:45 AM   Result Value Ref Range    Benzodiazepine, Urine Negative Negative    Methadone, Urine Negative Negative    Cocaine, Urine Negative Negative    Opiates, Urine Negative Negative    Barbiturates, Urine Negative Negative    Amphetamines, Urine  Negative Negative    THC Negative Negative    Phencyclidine, Urine Negative Negative    Urine Creatinine 31.4 23.0 - 375.0 mg/dL   Urinalysis, Reflex to Urine Culture Urine, Clean Catch    Collection Time: 05/27/25  2:45 AM    Specimen: Urine, Clean Catch   Result Value Ref Range    Color, UA Colorless (A) Straw, Roxy, Yellow, Light-Orange    Appearance, UA Clear Clear    pH, UA 7.0 5.0 - 8.0    Spec Grav UA 1.010 1.005 - 1.030    Protein, UA 1+ (A) Negative    Glucose, UA Negative Negative    Ketones, UA Negative Negative    Bilirubin, UA Negative Negative    Blood, UA Negative Negative    Nitrites, UA Negative Negative    Urobilinogen, UA Negative <2.0 EU/dL    Leukocyte Esterase, UA Negative Negative   GREY TOP URINE HOLD    Collection Time: 05/27/25  2:45 AM   Result Value Ref Range    Extra Tube Hold for add-ons.    Urinalysis Microscopic    Collection Time: 05/27/25  2:45 AM   Result Value Ref Range    RBC, UA 0 0 - 4 /HPF    WBC, UA 0 0 - 5 /HPF    WBC Clumps, UA None None, Rare    Bacteria, UA None None, Rare, Occasional /HPF    Yeast, UA None None /HPF    Squamous Epithelial Cells, UA 0 /HPF    Non-Squamous Epithelial Cells 0 <=0 /HPF    Hyaline Casts, UA 1 0 - 1 /LPF    Epithelial Casts 0 None /lpf    WBC Casts 0 None /LPF    RBC Casts 0 None /LPF    Granular Casts 0 None  /LPF    Fatty Casts, UA 0 None /LPF    Waxy Casts, UA 0 None /lpf    Triple Phosphate Crystals, UA None None, Rare, Occasional, Few, Moderate    Calcium Oxalate Crystals, UA None None, Rare, Occasional, Few, Moderate    Calcium Phosphate Crystal None Rare, None, Moderate, Few, Occasional    Calcium Carbonate Crystal None Occasional, Rare, Few, None, Moderate    Ammonium Urate Crystals None Moderate, Occasional, Rare, Few, None    Uric Acid Crystals, UA None None, Rare, Occasional, Few, Moderate    Amorphous, UA None None, Occasional, Few, Moderate, Rare    Unclassified Crystals None None, Rare, Occasional, Few, Moderate    Trichomonas,  UA None None /HPF    Other, UA None None    Microscopic Comment     Hemoglobin A1C    Collection Time: 05/27/25  6:43 AM   Result Value Ref Range    Hemoglobin A1c 5.3 4.0 - 5.6 %    Estimated Average Glucose 105 68 - 131 mg/dL   Basic metabolic panel    Collection Time: 05/27/25  6:43 AM   Result Value Ref Range    Sodium 138 136 - 145 mmol/L    Potassium 3.3 (L) 3.5 - 5.1 mmol/L    Chloride 102 95 - 110 mmol/L    CO2 25 23 - 29 mmol/L    Glucose 112 (H) 70 - 110 mg/dL    BUN 13 6 - 20 mg/dL    Creatinine 1.0 0.5 - 1.4 mg/dL    Calcium 8.7 8.7 - 10.5 mg/dL    Anion Gap 11 8 - 16 mmol/L    eGFR >60 >60 mL/min/1.73/m2   Troponin I    Collection Time: 05/27/25  6:43 AM   Result Value Ref Range    Troponin-I 0.143 (H) <=0.026 ng/mL   Echo    Collection Time: 05/27/25  8:56 AM   Result Value Ref Range    BSA 2.14 m2    LA WIDTH 4.2 cm    RA Width 4.5 cm    A4C EF 43 %    LVOT stroke volume 93.4 cm3    LVIDd 5.8 3.5 - 6.0 cm    LV Systolic Volume 113 mL    LV Systolic Volume Index 54.9 mL/m2    LVIDs 4.9 (A) 2.1 - 4.0 cm    LV ESV A2C 114.04 mL    LV Diastolic Volume 167 mL    LV ESV A4C 67.54 mL    LV Diastolic Volume Index 81.07 mL/m2    LV EDV A4C 134.14 mL    Left Ventricular End Systolic Volume by Teichholz Method 113.29 mL    Left Ventricular End Diastolic Volume by Teichholz Method 166.68 mL    IVS 1.3 (A) 0.6 - 1.1 cm    LVOT diameter 2.3 cm    LVOT area 4.2 cm2    FS 15.5 (A) 28 - 44 %    Left Ventricle Relative Wall Thickness 0.62 cm    PW 1.8 (A) 0.6 - 1.1 cm    LV mass 424.8 g    LV Mass Index 206.2 g/m2    MV Peak E Keon 1.00 m/s    TDI LATERAL 0.03 m/s    TDI SEPTAL 0.05 m/s    E/E' ratio 25 m/s    MV Peak A Keon 1.14 m/s    TR Max Keon 2.8 m/s    E/A ratio 0.88     IVRT 86 msec    E wave deceleration time 142 msec    LV SEPTAL E/E' RATIO 20.0 m/s    JOSH 53 mL/m2    LV LATERAL E/E' RATIO 33.3 m/s    LA Vol 108 cm3    LVOT peak keon 1.3 m/s    Left Ventricular Outflow Tract Mean Velocity 0.93 cm/s    Left  Ventricular Outflow Tract Mean Gradient 3.84 mmHg    RV- caraballo basal diam 3.9 cm    RVOT peak VTI 14.7 cm    TAPSE 2.9 cm    RV/LV Ratio 0.67 cm    LA size 4.3 cm    Left Atrium Minor Axis 6.9 cm    Left Atrium Major Axis 7.2 cm    LA Vol (MOD) 88 mL    JOSH (MOD) 43 mL/m2    RA Major Axis 6.52 cm    AV regurgitation pressure 1/2 time 454 ms    AR Max Yandel 5.37 m/s    AV mean gradient 11 mmHg    AV peak gradient 16 mmHg    Ao peak yandel 2.0 m/s    Ao VTI 35.1 cm    LVOT peak VTI 22.5 cm    AV valve area 2.7 cm²    AV Velocity Ratio 0.65     AV index (prosthetic) 0.64     GODWIN by Velocity Ratio 2.7 cm²    MV mean gradient 4 mmHg    MV peak gradient 8 mmHg    MV stenosis pressure 1/2 time 42.72 ms    MV valve area p 1/2 method 5.15 cm2    MV valve area by continuity eq 3.27 cm2    MV VTI 28.6 cm    Triscuspid Valve Regurgitation Peak Gradient 30 mmHg    PV mean gradient 2 mmHg    PV PEAK VELOCITY 1.49 m/s    PV peak gradient 9 mmHg    Pulmonary Valve Mean Velocity 0.92 m/s    RVOT peak yandel 0.90 m/s    Ao root annulus 4.0 cm    STJ 2.6 cm    Ascending aorta 3.9 cm    ASI 1.9 cm/m2    IVC diameter 1.89 cm    Mean e' 0.04 m/s    ZLVIDS 1.99     ZLVIDD -0.70     LA area A4C 23.40 cm2    LA area A2C 29.94 cm2    EF 45 %    TV resting pulmonary artery pressure 34 mmHg    RV TB RVSP 6 mmHg    Est. RA pres 3 mmHg   Basic metabolic panel    Collection Time: 05/28/25  4:22 AM   Result Value Ref Range    Sodium 139 136 - 145 mmol/L    Potassium 2.9 (L) 3.5 - 5.1 mmol/L    Chloride 100 95 - 110 mmol/L    CO2 28 23 - 29 mmol/L    Glucose 91 70 - 110 mg/dL    BUN 22 (H) 6 - 20 mg/dL    Creatinine 1.4 0.5 - 1.4 mg/dL    Calcium 8.8 8.7 - 10.5 mg/dL    Anion Gap 11 8 - 16 mmol/L    eGFR >60 >60 mL/min/1.73/m2   Troponin I    Collection Time: 05/28/25  8:40 AM   Result Value Ref Range    Troponin-I 0.067 (H) <=0.026 ng/mL        Significant Imaging:   Imaging Results              X-Ray Chest AP Portable (Final result)  Result time 05/27/25  07:45:51      Final result by Taran Stauffer MD (05/27/25 07:45:51)                   Impression:      As above.      Electronically signed by: Taran Stauffer  Date:    05/27/2025  Time:    07:45               Narrative:    EXAMINATION:  XR CHEST AP PORTABLE    CLINICAL HISTORY:  sob;.    TECHNIQUE:  Single frontal portable view of the chest was performed.    COMPARISON:  None    FINDINGS:  Pulmonary vascular congestion with moderate interstitial/pulmonary edema.  Moderate cardiomegaly.  No pneumothorax or pleural effusion.

## 2025-05-28 NOTE — ASSESSMENT & PLAN NOTE
- trop trending down 0.143 > 0.067  - echo completed yesterday- Ejection fraction is approximately 45%. Grade II diastolic dysfunction.   - Stress test today

## 2025-05-28 NOTE — HOSPITAL COURSE
5/28- Echo showed EF approximately 45%. Grade II diastolic dysfunction. Started on ARB and will start BB today. Patient remains free of CP/ SOB.  Diuresed well over night (2L of output).  BP remains normotensive today, other vital signs stable. Stress test scheduled for today. Hypokalemia noted- continuing with PO K regimen.     5/29- Stress test rescheduled from yesterday to today. Patient remains free of CP/ SOB. Hypokalemia improving. Cardiology added Coreg and Losartan added to HTN medications, recommends adding PO lasix 20mg daily upon DC.     5/30- Stress test/  LHC yesterday- with normal coronary arteries and EF approx 40%.  Cardiology cleared for f/u outpatient.  Home medications to include: Lasix 20mg, Coreg, Losartan, and Atorvastatin. VSS. Labs unremarkable today.  Free of chest pain since admission. Discussed lifestyle modifications.   This patient has been seen and examined on the date of discharge and determined suitable for discharge.

## 2025-05-28 NOTE — ASSESSMENT & PLAN NOTE
-Troponin 0.110>0.143, continue to trend  -Likely due to demand ischemia from uncontrolled HTN/CHF  -ASA  -ARB  -Statin if indicated  -BB tmw  -TTE pending; stress test IP vs OP    5/28/2025  -Flat  -TTE with EF of 45%  -Continue ASA, ARB, BB  -MPI stress test pending

## 2025-05-28 NOTE — ASSESSMENT & PLAN NOTE
Patient's most recent potassium results are listed below.   Recent Labs     05/27/25  0205 05/27/25  0643 05/28/25  0422   K 2.9* 3.3* 2.9*     Plan  - Replete potassium per protocol  - Monitor potassium Daily  - Patient's hypokalemia is being treated

## 2025-05-28 NOTE — SUBJECTIVE & OBJECTIVE
Review of Systems   Constitutional: Negative.   HENT: Negative.     Eyes: Negative.    Cardiovascular: Negative.    Respiratory: Negative.     Endocrine: Negative.    Hematologic/Lymphatic: Negative.    Skin: Negative.    Musculoskeletal: Negative.    Gastrointestinal: Negative.    Genitourinary: Negative.    Neurological:  Positive for dizziness (mild) and light-headedness (mild).   Psychiatric/Behavioral: Negative.     Allergic/Immunologic: Negative.      Objective:     Vital Signs (Most Recent):  Temp: 98.1 °F (36.7 °C) (05/28/25 1229)  Pulse: 69 (05/28/25 1229)  Resp: 16 (05/28/25 1229)  BP: 135/73 (05/28/25 1229)  SpO2: 96 % (05/28/25 1229) Vital Signs (24h Range):  Temp:  [97.6 °F (36.4 °C)-98.2 °F (36.8 °C)] 98.1 °F (36.7 °C)  Pulse:  [57-73] 69  Resp:  [16-20] 16  SpO2:  [94 %-98 %] 96 %  BP: (135-164)/() 135/73     Weight: 94.8 kg (208 lb 15.9 oz)  Body mass index is 34.78 kg/m².     SpO2: 96 %         Intake/Output Summary (Last 24 hours) at 5/28/2025 1438  Last data filed at 5/28/2025 1229  Gross per 24 hour   Intake 480 ml   Output 1600 ml   Net -1120 ml       Lines/Drains/Airways       Peripheral Intravenous Line  Duration                  Peripheral IV - Single Lumen 05/27/25 0205 18 G Anterior;Left Forearm 1 day                       Physical Exam  Vitals and nursing note reviewed.   Constitutional:       General: He is not in acute distress.     Appearance: Normal appearance. He is well-developed. He is not diaphoretic.   HENT:      Head: Normocephalic and atraumatic.   Eyes:      General:         Right eye: No discharge.         Left eye: No discharge.      Pupils: Pupils are equal, round, and reactive to light.   Cardiovascular:      Rate and Rhythm: Normal rate and regular rhythm.      Heart sounds: S1 normal and S2 normal. No murmur heard.  Pulmonary:      Effort: Pulmonary effort is normal. No respiratory distress.      Breath sounds: No rales.   Musculoskeletal:      Right lower leg:  "No edema.      Left lower leg: No edema.   Skin:     General: Skin is warm and dry.      Findings: No erythema.   Neurological:      Mental Status: He is alert and oriented to person, place, and time.   Psychiatric:         Mood and Affect: Mood normal.         Behavior: Behavior normal.            Significant Labs: CMP   Recent Labs   Lab 05/27/25  0205 05/27/25  0643 05/28/25  0422    138 139   K 2.9* 3.3* 2.9*    102 100   CO2 23 25 28   GLU 96 112* 91   BUN 13 13 22*   CREATININE 1.0 1.0 1.4   CALCIUM 8.5* 8.7 8.8   PROT 7.2  --   --    ALBUMIN 3.8  --   --    BILITOT 0.6  --   --    ALKPHOS 87  --   --    AST 26  --   --    ALT 30  --   --    ANIONGAP 12 11 11   , CBC   Recent Labs   Lab 05/27/25  0205   WBC 10.43   HGB 15.7   HCT 45.8      , Troponin No results for input(s): "TROPONINIHS" in the last 48 hours., and All pertinent lab results from the last 24 hours have been reviewed.    Significant Imaging: Echocardiogram: Transthoracic echo (TTE) complete (Cupid Only):   Results for orders placed or performed during the hospital encounter of 05/27/25   Echo   Result Value Ref Range    BSA 2.14 m2    LA WIDTH 4.2 cm    RA Width 4.5 cm    A4C EF 43 %    LVOT stroke volume 93.4 cm3    LVIDd 5.8 3.5 - 6.0 cm    LV Systolic Volume 113 mL    LV Systolic Volume Index 54.9 mL/m2    LVIDs 4.9 (A) 2.1 - 4.0 cm    LV ESV A2C 114.04 mL    LV Diastolic Volume 167 mL    LV ESV A4C 67.54 mL    LV Diastolic Volume Index 81.07 mL/m2    LV EDV A4C 134.14 mL    Left Ventricular End Systolic Volume by Teichholz Method 113.29 mL    Left Ventricular End Diastolic Volume by Teichholz Method 166.68 mL    IVS 1.3 (A) 0.6 - 1.1 cm    LVOT diameter 2.3 cm    LVOT area 4.2 cm2    FS 15.5 (A) 28 - 44 %    Left Ventricle Relative Wall Thickness 0.62 cm    PW 1.8 (A) 0.6 - 1.1 cm    LV mass 424.8 g    LV Mass Index 206.2 g/m2    MV Peak E Keon 1.00 m/s    TDI LATERAL 0.03 m/s    TDI SEPTAL 0.05 m/s    E/E' ratio 25 m/s    " MV Peak A Keon 1.14 m/s    TR Max Keon 2.8 m/s    E/A ratio 0.88     IVRT 86 msec    E wave deceleration time 142 msec    LV SEPTAL E/E' RATIO 20.0 m/s    JOSH 53 mL/m2    LV LATERAL E/E' RATIO 33.3 m/s    LA Vol 108 cm3    LVOT peak keon 1.3 m/s    Left Ventricular Outflow Tract Mean Velocity 0.93 cm/s    Left Ventricular Outflow Tract Mean Gradient 3.84 mmHg    RV- caraballo basal diam 3.9 cm    RVOT peak VTI 14.7 cm    TAPSE 2.9 cm    RV/LV Ratio 0.67 cm    LA size 4.3 cm    Left Atrium Minor Axis 6.9 cm    Left Atrium Major Axis 7.2 cm    LA Vol (MOD) 88 mL    JOSH (MOD) 43 mL/m2    RA Major Axis 6.52 cm    AV regurgitation pressure 1/2 time 454 ms    AR Max Keon 5.37 m/s    AV mean gradient 11 mmHg    AV peak gradient 16 mmHg    Ao peak keon 2.0 m/s    Ao VTI 35.1 cm    LVOT peak VTI 22.5 cm    AV valve area 2.7 cm²    AV Velocity Ratio 0.65     AV index (prosthetic) 0.64     GODWIN by Velocity Ratio 2.7 cm²    MV mean gradient 4 mmHg    MV peak gradient 8 mmHg    MV stenosis pressure 1/2 time 42.72 ms    MV valve area p 1/2 method 5.15 cm2    MV valve area by continuity eq 3.27 cm2    MV VTI 28.6 cm    Triscuspid Valve Regurgitation Peak Gradient 30 mmHg    PV mean gradient 2 mmHg    PV PEAK VELOCITY 1.49 m/s    PV peak gradient 9 mmHg    Pulmonary Valve Mean Velocity 0.92 m/s    RVOT peak keon 0.90 m/s    Ao root annulus 4.0 cm    STJ 2.6 cm    Ascending aorta 3.9 cm    ASI 1.9 cm/m2    IVC diameter 1.89 cm    Mean e' 0.04 m/s    ZLVIDS 1.99     ZLVIDD -0.70     LA area A4C 23.40 cm2    LA area A2C 29.94 cm2    EF 45 %    TV resting pulmonary artery pressure 34 mmHg    RV TB RVSP 6 mmHg    Est. RA pres 3 mmHg    Narrative      Left Ventricle: The left ventricle is normal in size. Mildly increased   wall thickness. There is concentric hypertrophy. There is mildly reduced   systolic function with a visually estimated ejection fraction of 40 - 50%.   Ejection fraction is approximately 45%. Grade II diastolic dysfunction.     Right Ventricle: The right ventricle is normal in size Wall thickness   is normal. Systolic function is normal.    Left Atrium: The left atrium is severely dilated    Aortic Valve: There is mild aortic regurgitation.    Pulmonary Artery: The estimated pulmonary artery systolic pressure is   34 mmHg.    IVC/SVC: Normal venous pressure at 3 mmHg.      and EKG: Reviewed

## 2025-05-28 NOTE — ASSESSMENT & PLAN NOTE
-Presents with decompensated CHF in setting of uncontrolled HTN  -Continue IV diuresis  -ARB added  -Will plan to add Coreg tmw  -TTE pending  -Strict I's/O's  -Counseled on low salt diet    5/28/2025  -Much improved  -Will consider po Lasix tmw  -TTE with EF of 45%  -Continue Losartan, Coreg added today  -MPI stress test pending

## 2025-05-28 NOTE — ASSESSMENT & PLAN NOTE
Patient has a current diagnosis of hypertensive urgency (without evidence of end organ damage) which is controlled.  Latest blood pressure and vitals reviewed-   Temp:  [97.6 °F (36.4 °C)-98.2 °F (36.8 °C)]   Pulse:  [57-73]   Resp:  [16-20]   BP: (135-164)/()   SpO2:  [94 %-98 %] .   Patient currently off IV antihypertensives.   Home meds for hypertension were reviewed and noted below.   Hypertension Medications              amLODIPine (NORVASC) 10 MG tablet Take 1 tablet (10 mg total) by mouth once daily.    hydroCHLOROthiazide (HYDRODIURIL) 25 MG tablet Take 1 tablet (25 mg total) by mouth once daily.    losartan (COZAAR) 50 MG tablet Take 2 tablets (100 mg total) by mouth once daily.            Medication adjustment for hospital antihypertensives is as follows- resume HTN meds and utilize prn hydralazine     Will aim for controlled BP reduction by medications noted above. Monitor and mitigate end organ damage as indicated.   Is This A New Presentation, Or A Follow-Up?: Acne

## 2025-05-28 NOTE — ASSESSMENT & PLAN NOTE
Patient has unspecified heart failure that is Acute. On presentation their CHF was decompensated. Evidence of decompensated CHF on presentation includes: crackles on lung auscultation, orthopnea, paroxysmal nocturnal dyspnea (PND), dyspnea on exertion (ESTRADA), and shortness of breath. The etiology of their decompensation is likely dietary indiscretion and increased fluid intake. Most recent BNP and echo results are listed below.  Recent Labs     05/27/25  0205   *     Latest ECHO  Results for orders placed during the hospital encounter of 12/21/21    Echo    Interpretation Summary  · Concentric hypertrophy and low normal systolic function.  · The estimated ejection fraction is 50%.  · Grade II left ventricular diastolic dysfunction.  · With normal right ventricular systolic function.  · Normal central venous pressure (3 mmHg).  · The estimated PA systolic pressure is 53 mmHg.  · There is pulmonary hypertension.  · The sinuses of Valsalva is mildly dilated.    Current Heart Failure Medications  losartan tablet 100 mg, Daily, Oral  carvediloL tablet 3.125 mg, 2 times daily, Oral    Plan  - Monitor strict I&Os and daily weights.    - Place on telemetry  - Low sodium diet  - Place on fluid restriction of 2 L.   - Cardiology has been consulted  - The patient's volume status is improving and at baseline

## 2025-05-28 NOTE — PROGRESS NOTES
[facility] - Telemetry ([facility])  Cardiology  Progress Note    Patient Name: Henry Harmon  MRN: [id number]  Admission Date: 5/27/2025  Hospital Length of Stay: 1 days  Code Status: Full Code   Attending Physician: Spenser Lyons MD   Primary Care Physician: Catie, Primary Doctor  Expected Discharge Date: 5/31/2025  Principal Problem:CHF exacerbation    Subjective:   HPI:  Mr. Harmon is a 40 year old male patient whose current medical conditions include HTN who presented to [facility] ED overnight due to increased SOB over the past 1-2 days. Associated symptoms included left-sided chest pressure, orthopnea, non-productive cough, and PND. He denied any associated nausea, BLE edema, vomiting, palpitations, near syncope, or syncope. Reported he had been off of BP medications for past 6 months-1 year. Initial workup in ED revealed uncontrolled BP (233/149), BNP of 236, and troponin of 0.110. CXR with pulmonary edema. Patient subsequently given IV Lasix and IV hydralazine and admitted for further evaluation and treatment. Cardiology consulted to assist with management. Patient seen and examined today, resting in bed. Feeling better, less SOB. Excellent diuresis. CP free. No piror history of CHF. Denies familial history of CAD. Chart reviewed. Troponin 0.110>0.143. TTE pending. EKG with LVH.     Hospital Course:   5/28/2025-Patient seen and examined today, resting in bed. Feels much better. SOB resolved, diuresed over 4L. Did report some mild LH/dizziness with ambulation. No CP. BP improved. TTE with EF of 45%, DD. MPI stress test ordered.        Review of Systems   Constitutional: Negative.   HENT: Negative.     Eyes: Negative.    Cardiovascular: Negative.    Respiratory: Negative.     Endocrine: Negative.    Hematologic/Lymphatic: Negative.    Skin: Negative.    Musculoskeletal: Negative.    Gastrointestinal: Negative.    Genitourinary: Negative.    Neurological:  Positive for dizziness (mild) and light-headedness  (mild).   Psychiatric/Behavioral: Negative.     Allergic/Immunologic: Negative.      Objective:     Vital Signs (Most Recent):  Temp: 98.1 °F (36.7 °C) (05/28/25 1229)  Pulse: 69 (05/28/25 1229)  Resp: 16 (05/28/25 1229)  BP: 135/73 (05/28/25 1229)  SpO2: 96 % (05/28/25 1229) Vital Signs (24h Range):  Temp:  [97.6 °F (36.4 °C)-98.2 °F (36.8 °C)] 98.1 °F (36.7 °C)  Pulse:  [57-73] 69  Resp:  [16-20] 16  SpO2:  [94 %-98 %] 96 %  BP: (135-164)/() 135/73     Weight: 94.8 kg (208 lb 15.9 oz)  Body mass index is 34.78 kg/m².     SpO2: 96 %         Intake/Output Summary (Last 24 hours) at 5/28/2025 1438  Last data filed at 5/28/2025 1229  Gross per 24 hour   Intake 480 ml   Output 1600 ml   Net -1120 ml       Lines/Drains/Airways       Peripheral Intravenous Line  Duration                  Peripheral IV - Single Lumen 05/27/25 0205 18 G Anterior;Left Forearm 1 day                       Physical Exam  Vitals and nursing note reviewed.   Constitutional:       General: He is not in acute distress.     Appearance: Normal appearance. He is well-developed. He is not diaphoretic.   HENT:      Head: Normocephalic and atraumatic.   Eyes:      General:         Right eye: No discharge.         Left eye: No discharge.      Pupils: Pupils are equal, round, and reactive to light.   Cardiovascular:      Rate and Rhythm: Normal rate and regular rhythm.      Heart sounds: S1 normal and S2 normal. No murmur heard.  Pulmonary:      Effort: Pulmonary effort is normal. No respiratory distress.      Breath sounds: No rales.   Musculoskeletal:      Right lower leg: No edema.      Left lower leg: No edema.   Skin:     General: Skin is warm and dry.      Findings: No erythema.   Neurological:      Mental Status: He is alert and oriented to person, place, and time.   Psychiatric:         Mood and Affect: Mood normal.         Behavior: Behavior normal.            Significant Labs: CMP   Recent Labs   Lab 05/27/25  0205 05/27/25  0643  "05/28/25  0422    138 139   K 2.9* 3.3* 2.9*    102 100   CO2 23 25 28   GLU 96 112* 91   BUN 13 13 22*   CREATININE 1.0 1.0 1.4   CALCIUM 8.5* 8.7 8.8   PROT 7.2  --   --    ALBUMIN 3.8  --   --    BILITOT 0.6  --   --    ALKPHOS 87  --   --    AST 26  --   --    ALT 30  --   --    ANIONGAP 12 11 11   , CBC   Recent Labs   Lab 05/27/25  0205   WBC 10.43   HGB 15.7   HCT 45.8      , Troponin No results for input(s): "TROPONINIHS" in the last 48 hours., and All pertinent lab results from the last 24 hours have been reviewed.    Significant Imaging: Echocardiogram: Transthoracic echo (TTE) complete ([facility] Only):   Results for orders placed or performed during the hospital encounter of 05/27/25   Echo   Result Value Ref Range    BSA 2.14 m2    LA WIDTH 4.2 cm    RA Width 4.5 cm    A4C EF 43 %    LVOT stroke volume 93.4 cm3    LVIDd 5.8 3.5 - 6.0 cm    LV Systolic Volume 113 mL    LV Systolic Volume Index 54.9 mL/m2    LVIDs 4.9 (A) 2.1 - 4.0 cm    LV ESV A2C 114.04 mL    LV Diastolic Volume 167 mL    LV ESV A4C 67.54 mL    LV Diastolic Volume Index 81.07 mL/m2    LV EDV A4C 134.14 mL    Left Ventricular End Systolic Volume by Teichholz Method 113.29 mL    Left Ventricular End Diastolic Volume by Teichholz Method 166.68 mL    IVS 1.3 (A) 0.6 - 1.1 cm    LVOT diameter 2.3 cm    LVOT area 4.2 cm2    FS 15.5 (A) 28 - 44 %    Left Ventricle Relative Wall Thickness 0.62 cm    PW 1.8 (A) 0.6 - 1.1 cm    LV mass 424.8 g    LV Mass Index 206.2 g/m2    MV Peak E Keon 1.00 m/s    TDI LATERAL 0.03 m/s    TDI SEPTAL 0.05 m/s    E/E' ratio 25 m/s    MV Peak A Keon 1.14 m/s    TR Max Keon 2.8 m/s    E/A ratio 0.88     IVRT 86 msec    E wave deceleration time 142 msec    LV SEPTAL E/E' RATIO 20.0 m/s    JOSH 53 mL/m2    LV LATERAL E/E' RATIO 33.3 m/s    LA Vol 108 cm3    LVOT peak keon 1.3 m/s    Left Ventricular Outflow Tract Mean Velocity 0.93 cm/s    Left Ventricular Outflow Tract Mean Gradient 3.84 mmHg    RV- caraballo " basal diam 3.9 cm    RVOT peak VTI 14.7 cm    TAPSE 2.9 cm    RV/LV Ratio 0.67 cm    LA size 4.3 cm    Left Atrium Minor Axis 6.9 cm    Left Atrium Major Axis 7.2 cm    LA Vol (MOD) 88 mL    JOSH (MOD) 43 mL/m2    RA Major Axis 6.52 cm    AV regurgitation pressure 1/2 time 454 ms    AR Max Yandel 5.37 m/s    AV mean gradient 11 mmHg    AV peak gradient 16 mmHg    Ao peak yandel 2.0 m/s    Ao VTI 35.1 cm    LVOT peak VTI 22.5 cm    AV valve area 2.7 cm²    AV Velocity Ratio 0.65     AV index (prosthetic) 0.64     GODWIN by Velocity Ratio 2.7 cm²    MV mean gradient 4 mmHg    MV peak gradient 8 mmHg    MV stenosis pressure 1/2 time 42.72 ms    MV valve area p 1/2 method 5.15 cm2    MV valve area by continuity eq 3.27 cm2    MV VTI 28.6 cm    Triscuspid Valve Regurgitation Peak Gradient 30 mmHg    PV mean gradient 2 mmHg    PV PEAK VELOCITY 1.49 m/s    PV peak gradient 9 mmHg    Pulmonary Valve Mean Velocity 0.92 m/s    RVOT peak yandel 0.90 m/s    Ao root annulus 4.0 cm    STJ 2.6 cm    Ascending aorta 3.9 cm    ASI 1.9 cm/m2    IVC diameter 1.89 cm    Mean e' 0.04 m/s    ZLVIDS 1.99     ZLVIDD -0.70     LA area A4C 23.40 cm2    LA area A2C 29.94 cm2    EF 45 %    TV resting pulmonary artery pressure 34 mmHg    RV TB RVSP 6 mmHg    Est. RA pres 3 mmHg    Narrative      Left Ventricle: The left ventricle is normal in size. Mildly increased   wall thickness. There is concentric hypertrophy. There is mildly reduced   systolic function with a visually estimated ejection fraction of 40 - 50%.   Ejection fraction is approximately 45%. Grade II diastolic dysfunction.    Right Ventricle: The right ventricle is normal in size Wall thickness   is normal. Systolic function is normal.    Left Atrium: The left atrium is severely dilated    Aortic Valve: There is mild aortic regurgitation.    Pulmonary Artery: The estimated pulmonary artery systolic pressure is   34 mmHg.    IVC/SVC: Normal venous pressure at 3 mmHg.      and EKG:  Reviewed  Assessment and Plan:   Patient who presents with acute CHF/HTN urgency/trop leak. Much improved. Meds adjusted. MPI stress test pending.    * CHF exacerbation  -Presents with decompensated CHF in setting of uncontrolled HTN  -Continue IV diuresis  -ARB added  -Will plan to add Coreg tmw  -TTE pending  -Strict I's/O's  -Counseled on low salt diet    5/28/2025  -Much improved  -Will consider po Lasix tmw  -TTE with EF of 45%  -Continue Losartan, Coreg added today  -MPI stress test pending    Hypertensive urgency  -Due to medication non-compliance  -ARB initiated  -Diurese   -Will plan to add BB tomorrow  -TTE pending    5/28/2025  -BP improved  -Continue ARB  -Coreg 3.125 mg BID added  -TTE with EF of 45%  -MPI stress test pending    Hypokalemia  -Being repleted    Elevated troponin  -Troponin 0.110>0.143, continue to trend  -Likely due to demand ischemia from uncontrolled HTN/CHF  -ASA  -ARB  -Statin if indicated  -BB tmw  -TTE pending; stress test IP vs OP    5/28/2025  -Flat  -TTE with EF of 45%  -Continue ASA, ARB, BB  -MPI stress test pending        VTE Risk Mitigation (From admission, onward)           Ordered     enoxaparin injection 40 mg  Daily         05/27/25 0449     IP VTE HIGH RISK PATIENT  Once         05/27/25 0449     Place sequential compression device  Until discontinued         05/27/25 0449                    Nieves Carballo PA-C  Cardiology  [facility] - Telemetry ([facility])

## 2025-05-28 NOTE — PROGRESS NOTES
[facility] - Telemetry ([facility])  [facility]  Progress Note    Patient Name: Henry Harmon  MRN: [id number]  Patient Class: IP- Inpatient   Admission Date: 5/27/2025  Length of Stay: 1 days  Attending Physician: Spenser Lyons MD  Primary Care Provider: Catie, Primary Doctor        Subjective     Principal Problem:CHF exacerbation        HPI:  Henry Harmon is a 40 y.o. male with a PMH  has a past medical history of Hypertension.presented to the Emergency Department for evaluation of SOB which began 1 day ago. Pt reports L-sided chest pain and his sxs have worsened today that prompted his ER visit. Pt reports that the doctor he sees took him off of his BP medications years ago. Symptoms are constant and moderate in severity.  Patient reports his shortness of breath worsens with exertion and with lying flat.  Patient states he has been having asleep with 2 pillows in mostly in the upright position. Shortness of breath improves when not exerting himself.  Associated sxs include nonproductive cough.  Denies history of heart failure.  Patient denies any nausea. No prior Tx specified.  No further complaints or concerns at this time.      ER workup revealed CBC to be unremarkable.  CMP revealed potassium of 2.9.  .  Troponin 0.110.  Lactic acid normal.  TSH 4.238.  Free T4 0.99.  Urine and drug UA negative.  Flu and COVID negative.  Chest x-ray revealed cardiomegaly with evidence of bilateral pulmonary edema per wet read.  EKG revealed normal sinus rhythm with biatrial enlargement and right word axis with ventricular rate of 97 beats per minute and a QT/QTC of 386/490.  BP initially elevated to 233/149 in ER, but improved to of 132/70 in ER after receiving 60 mg Lasix IV, 10 mg hydralazine IV, 20 mg hydralazine IV.  Patient also received 50 mEq potassium bicarbonate disintegrating tablet in addition to 325 mg aspirin 650 mg Tylenol in ER.  Hospital Medicine consulted to admit patient for further  workup for CHF.  Patient in agreement with treatment plan.  Patient admitted under inpatient status.    PCP: Catie, Primary Doctor      Overview/Hospital Course:  5/28- Echo showed EF approximately 45%. Grade II diastolic dysfunction. Started on ARB and will start BB today. Patient remains free of CP/ SOB.  Diuresed well over night (2L of output).  BP remains normotensive today, other vital signs stable. Stress test scheduled for today. Hypokalemia noted again 40mEq of PO K added to regimen.     Interval History: Reports much improvement in symptoms today.     Review of Systems   Constitutional:  Negative for activity change, diaphoresis and fatigue.   HENT:  Negative for congestion.    Eyes: Negative.    Respiratory: Negative.  Negative for cough.    Cardiovascular:  Negative for chest pain, palpitations and leg swelling.   Gastrointestinal:  Negative for abdominal distention and constipation.   Genitourinary:  Negative for difficulty urinating.   Neurological:  Negative for dizziness, weakness and numbness.   Psychiatric/Behavioral:  Negative for agitation, behavioral problems and confusion.      Objective:     Vital Signs (Most Recent):  Temp: 97.9 °F (36.6 °C) (05/28/25 0832)  Pulse: 67 (05/28/25 0832)  Resp: 16 (05/28/25 0832)  BP: (!) 153/87 (05/28/25 0832)  SpO2: 95 % (05/28/25 0832) Vital Signs (24h Range):  Temp:  [97.6 °F (36.4 °C)-98.2 °F (36.8 °C)] 97.9 °F (36.6 °C)  Pulse:  [57-88] 67  Resp:  [16-20] 16  SpO2:  [94 %-98 %] 95 %  BP: (144-164)/() 153/87     Weight: 94.8 kg (208 lb 15.9 oz)  Body mass index is 34.78 kg/m².    Intake/Output Summary (Last 24 hours) at 5/28/2025 1103  Last data filed at 5/28/2025 0600  Gross per 24 hour   Intake 240 ml   Output 2300 ml   Net -2060 ml         Physical Exam  Constitutional:       Appearance: Normal appearance.   HENT:      Right Ear: Tympanic membrane normal.      Mouth/Throat:      Mouth: Mucous membranes are moist.      Pharynx: Oropharynx is clear.    Eyes:      Pupils: Pupils are equal, round, and reactive to light.   Cardiovascular:      Rate and Rhythm: Normal rate and regular rhythm.      Pulses: Normal pulses.   Musculoskeletal:         General: Normal range of motion.      Cervical back: Normal range of motion.      Right lower leg: No edema.      Left lower leg: No edema.   Skin:     General: Skin is warm and dry.      Capillary Refill: Capillary refill takes less than 2 seconds.   Neurological:      General: No focal deficit present.      Mental Status: He is alert and oriented to person, place, and time.               Significant Labs:   Results for orders placed or performed during the hospital encounter of 05/27/25   EKG 12-lead    Collection Time: 05/27/25  1:53 AM   Result Value Ref Range    QRS Duration 98 ms    OHS QTC Calculation 490 ms   Blood culture #1 **CANNOT BE ORDERED STAT**    Collection Time: 05/27/25  2:05 AM    Specimen: Peripheral, Forearm, Left; Blood   Result Value Ref Range    Blood Culture No Growth After 6 Hours    Hepatitis C Antibody    Collection Time: 05/27/25  2:05 AM   Result Value Ref Range    Hep C Ab Interp Negative Negative   HCV Virus Hold Specimen    Collection Time: 05/27/25  2:05 AM   Result Value Ref Range    Extra Tube Hold for add-ons.    HIV 1/2 Ag/Ab (4th Gen)    Collection Time: 05/27/25  2:05 AM   Result Value Ref Range    HIV 1/2 Ag/Ab Negative Negative   Comprehensive metabolic panel    Collection Time: 05/27/25  2:05 AM   Result Value Ref Range    Sodium 138 136 - 145 mmol/L    Potassium 2.9 (L) 3.5 - 5.1 mmol/L    Chloride 103 95 - 110 mmol/L    CO2 23 23 - 29 mmol/L    Glucose 96 70 - 110 mg/dL    BUN 13 6 - 20 mg/dL    Creatinine 1.0 0.5 - 1.4 mg/dL    Calcium 8.5 (L) 8.7 - 10.5 mg/dL    Protein Total 7.2 6.0 - 8.4 gm/dL    Albumin 3.8 3.5 - 5.2 g/dL    Bilirubin Total 0.6 0.1 - 1.0 mg/dL    ALP 87 40 - 150 unit/L    AST 26 11 - 45 unit/L    ALT 30 10 - 44 unit/L    Anion Gap 12 8 - 16 mmol/L    eGFR  >60 >60 mL/min/1.73/m2   Troponin I    Collection Time: 05/27/25  2:05 AM   Result Value Ref Range    Troponin-I 0.110 (H) <=0.026 ng/mL   Brain natriuretic peptide    Collection Time: 05/27/25  2:05 AM   Result Value Ref Range     (H) 0 - 99 pg/mL   Lactic Acid, Plasma    Collection Time: 05/27/25  2:05 AM   Result Value Ref Range    Lactic Acid Level 1.1 0.5 - 2.2 mmol/L   CBC with Differential    Collection Time: 05/27/25  2:05 AM   Result Value Ref Range    WBC 10.43 3.90 - 12.70 K/uL    RBC 5.21 4.60 - 6.20 M/uL    HGB 15.7 14.0 - 18.0 gm/dL    HCT 45.8 40.0 - 54.0 %    MCV 88 82 - 98 fL    MCH 30.1 27.0 - 31.0 pg    MCHC 34.3 32.0 - 36.0 g/dL    RDW 13.6 11.5 - 14.5 %    Platelet Count 233 150 - 450 K/uL    MPV 9.8 9.2 - 12.9 fL    Nucleated RBC 0 <=0 /100 WBC    Neut % 58.2 38 - 73 %    Lymph % 29.5 18 - 48 %    Mono % 7.3 4 - 15 %    Eos % 3.5 <=8 %    Basophil % 0.7 <=1.9 %    Imm Grans % 0.8 (H) 0.0 - 0.5 %    Neut # 6.07 1.8 - 7.7 K/uL    Lymph # 3.08 1 - 4.8 K/uL    Mono # 0.76 0.3 - 1 K/uL    Eos # 0.37 <=0.5 K/uL    Baso # 0.07 <=0.2 K/uL    Imm Grans # 0.08 (H) 0.00 - 0.04 K/uL   TSH    Collection Time: 05/27/25  2:05 AM   Result Value Ref Range    TSH 4.238 (H) 0.400 - 4.000 uIU/mL    Free T4 0.99 0.71 - 1.51 ng/dL   Light Blue Top Hold    Collection Time: 05/27/25  2:05 AM   Result Value Ref Range    Extra Tube Hold for add-ons.    Light Green Top Hold    Collection Time: 05/27/25  2:05 AM   Result Value Ref Range    Extra Tube Hold for add-ons.    Magnesium    Collection Time: 05/27/25  2:05 AM   Result Value Ref Range    Magnesium  1.8 1.6 - 2.6 mg/dL   Blood culture #2 **CANNOT BE ORDERED STAT**    Collection Time: 05/27/25  2:08 AM    Specimen: Peripheral, Forearm, Right; Blood   Result Value Ref Range    Blood Culture No Growth After 6 Hours    Influenza A & B by Molecular    Collection Time: 05/27/25  2:12 AM    Specimen: Nasal Swab   Result Value Ref Range    INFLUENZA A MOLECULAR  Negative Negative    INFLUENZA B MOLECULAR  Negative Negative   COVID-19 Rapid Screening    Collection Time: 05/27/25  2:12 AM   Result Value Ref Range    SARS COV-2 Molecular Negative Negative   Drug screen panel, emergency    Collection Time: 05/27/25  2:45 AM   Result Value Ref Range    Benzodiazepine, Urine Negative Negative    Methadone, Urine Negative Negative    Cocaine, Urine Negative Negative    Opiates, Urine Negative Negative    Barbiturates, Urine Negative Negative    Amphetamines, Urine Negative Negative    THC Negative Negative    Phencyclidine, Urine Negative Negative    Urine Creatinine 31.4 23.0 - 375.0 mg/dL   Urinalysis, Reflex to Urine Culture Urine, Clean Catch    Collection Time: 05/27/25  2:45 AM    Specimen: Urine, Clean Catch   Result Value Ref Range    Color, UA Colorless (A) Straw, Roxy, Yellow, Light-Orange    Appearance, UA Clear Clear    pH, UA 7.0 5.0 - 8.0    Spec Grav UA 1.010 1.005 - 1.030    Protein, UA 1+ (A) Negative    Glucose, UA Negative Negative    Ketones, UA Negative Negative    Bilirubin, UA Negative Negative    Blood, UA Negative Negative    Nitrites, UA Negative Negative    Urobilinogen, UA Negative <2.0 EU/dL    Leukocyte Esterase, UA Negative Negative   GREY TOP URINE HOLD    Collection Time: 05/27/25  2:45 AM   Result Value Ref Range    Extra Tube Hold for add-ons.    Urinalysis Microscopic    Collection Time: 05/27/25  2:45 AM   Result Value Ref Range    RBC, UA 0 0 - 4 /HPF    WBC, UA 0 0 - 5 /HPF    WBC Clumps, UA None None, Rare    Bacteria, UA None None, Rare, Occasional /HPF    Yeast, UA None None /HPF    Squamous Epithelial Cells, UA 0 /HPF    Non-Squamous Epithelial Cells 0 <=0 /HPF    Hyaline Casts, UA 1 0 - 1 /LPF    Epithelial Casts 0 None /lpf    WBC Casts 0 None /LPF    RBC Casts 0 None /LPF    Granular Casts 0 None  /LPF    Fatty Casts, UA 0 None /LPF    Waxy Casts, UA 0 None /lpf    Triple Phosphate Crystals, UA None None, Rare, Occasional, Few,  Moderate    Calcium Oxalate Crystals, UA None None, Rare, Occasional, Few, Moderate    Calcium Phosphate Crystal None Rare, None, Moderate, Few, Occasional    Calcium Carbonate Crystal None Occasional, Rare, Few, None, Moderate    Ammonium Urate Crystals None Moderate, Occasional, Rare, Few, None    Uric Acid Crystals, UA None None, Rare, Occasional, Few, Moderate    Amorphous, UA None None, Occasional, Few, Moderate, Rare    Unclassified Crystals None None, Rare, Occasional, Few, Moderate    Trichomonas, UA None None /HPF    Other, UA None None    Microscopic Comment     Hemoglobin A1C    Collection Time: 05/27/25  6:43 AM   Result Value Ref Range    Hemoglobin A1c 5.3 4.0 - 5.6 %    Estimated Average Glucose 105 68 - 131 mg/dL   Basic metabolic panel    Collection Time: 05/27/25  6:43 AM   Result Value Ref Range    Sodium 138 136 - 145 mmol/L    Potassium 3.3 (L) 3.5 - 5.1 mmol/L    Chloride 102 95 - 110 mmol/L    CO2 25 23 - 29 mmol/L    Glucose 112 (H) 70 - 110 mg/dL    BUN 13 6 - 20 mg/dL    Creatinine 1.0 0.5 - 1.4 mg/dL    Calcium 8.7 8.7 - 10.5 mg/dL    Anion Gap 11 8 - 16 mmol/L    eGFR >60 >60 mL/min/1.73/m2   Troponin I    Collection Time: 05/27/25  6:43 AM   Result Value Ref Range    Troponin-I 0.143 (H) <=0.026 ng/mL   Echo    Collection Time: 05/27/25  8:56 AM   Result Value Ref Range    BSA 2.14 m2    LA WIDTH 4.2 cm    RA Width 4.5 cm    A4C EF 43 %    LVOT stroke volume 93.4 cm3    LVIDd 5.8 3.5 - 6.0 cm    LV Systolic Volume 113 mL    LV Systolic Volume Index 54.9 mL/m2    LVIDs 4.9 (A) 2.1 - 4.0 cm    LV ESV A2C 114.04 mL    LV Diastolic Volume 167 mL    LV ESV A4C 67.54 mL    LV Diastolic Volume Index 81.07 mL/m2    LV EDV A4C 134.14 mL    Left Ventricular End Systolic Volume by Teichholz Method 113.29 mL    Left Ventricular End Diastolic Volume by Teichholz Method 166.68 mL    IVS 1.3 (A) 0.6 - 1.1 cm    LVOT diameter 2.3 cm    LVOT area 4.2 cm2    FS 15.5 (A) 28 - 44 %    Left Ventricle  Relative Wall Thickness 0.62 cm    PW 1.8 (A) 0.6 - 1.1 cm    LV mass 424.8 g    LV Mass Index 206.2 g/m2    MV Peak E Keon 1.00 m/s    TDI LATERAL 0.03 m/s    TDI SEPTAL 0.05 m/s    E/E' ratio 25 m/s    MV Peak A Keon 1.14 m/s    TR Max Keon 2.8 m/s    E/A ratio 0.88     IVRT 86 msec    E wave deceleration time 142 msec    LV SEPTAL E/E' RATIO 20.0 m/s    JOSH 53 mL/m2    LV LATERAL E/E' RATIO 33.3 m/s    LA Vol 108 cm3    LVOT peak keon 1.3 m/s    Left Ventricular Outflow Tract Mean Velocity 0.93 cm/s    Left Ventricular Outflow Tract Mean Gradient 3.84 mmHg    RV- caraballo basal diam 3.9 cm    RVOT peak VTI 14.7 cm    TAPSE 2.9 cm    RV/LV Ratio 0.67 cm    LA size 4.3 cm    Left Atrium Minor Axis 6.9 cm    Left Atrium Major Axis 7.2 cm    LA Vol (MOD) 88 mL    JOSH (MOD) 43 mL/m2    RA Major Axis 6.52 cm    AV regurgitation pressure 1/2 time 454 ms    AR Max Keon 5.37 m/s    AV mean gradient 11 mmHg    AV peak gradient 16 mmHg    Ao peak keon 2.0 m/s    Ao VTI 35.1 cm    LVOT peak VTI 22.5 cm    AV valve area 2.7 cm²    AV Velocity Ratio 0.65     AV index (prosthetic) 0.64     GODWIN by Velocity Ratio 2.7 cm²    MV mean gradient 4 mmHg    MV peak gradient 8 mmHg    MV stenosis pressure 1/2 time 42.72 ms    MV valve area p 1/2 method 5.15 cm2    MV valve area by continuity eq 3.27 cm2    MV VTI 28.6 cm    Triscuspid Valve Regurgitation Peak Gradient 30 mmHg    PV mean gradient 2 mmHg    PV PEAK VELOCITY 1.49 m/s    PV peak gradient 9 mmHg    Pulmonary Valve Mean Velocity 0.92 m/s    RVOT peak keon 0.90 m/s    Ao root annulus 4.0 cm    STJ 2.6 cm    Ascending aorta 3.9 cm    ASI 1.9 cm/m2    IVC diameter 1.89 cm    Mean e' 0.04 m/s    ZLVIDS 1.99     ZLVIDD -0.70     LA area A4C 23.40 cm2    LA area A2C 29.94 cm2    EF 45 %    TV resting pulmonary artery pressure 34 mmHg    RV TB RVSP 6 mmHg    Est. RA pres 3 mmHg   Basic metabolic panel    Collection Time: 05/28/25  4:22 AM   Result Value Ref Range    Sodium 139 136 - 145  mmol/L    Potassium 2.9 (L) 3.5 - 5.1 mmol/L    Chloride 100 95 - 110 mmol/L    CO2 28 23 - 29 mmol/L    Glucose 91 70 - 110 mg/dL    BUN 22 (H) 6 - 20 mg/dL    Creatinine 1.4 0.5 - 1.4 mg/dL    Calcium 8.8 8.7 - 10.5 mg/dL    Anion Gap 11 8 - 16 mmol/L    eGFR >60 >60 mL/min/1.73/m2   Troponin I    Collection Time: 05/28/25  8:40 AM   Result Value Ref Range    Troponin-I 0.067 (H) <=0.026 ng/mL        Significant Imaging:   Imaging Results              X-Ray Chest AP Portable (Final result)  Result time 05/27/25 07:45:51      Final result by Taran Stauffer MD (05/27/25 07:45:51)                   Impression:      As above.      Electronically signed by: Taran Stauffer  Date:    05/27/2025  Time:    07:45               Narrative:    EXAMINATION:  XR CHEST AP PORTABLE    CLINICAL HISTORY:  sob;.    TECHNIQUE:  Single frontal portable view of the chest was performed.    COMPARISON:  None    FINDINGS:  Pulmonary vascular congestion with moderate interstitial/pulmonary edema.  Moderate cardiomegaly.  No pneumothorax or pleural effusion.                                         Assessment & Plan  CHF exacerbation  Patient has unspecified heart failure that is Acute. On presentation their CHF was decompensated. Evidence of decompensated CHF on presentation includes: crackles on lung auscultation, orthopnea, paroxysmal nocturnal dyspnea (PND), dyspnea on exertion (ESTRADA), and shortness of breath. The etiology of their decompensation is likely dietary indiscretion and increased fluid intake. Most recent BNP and echo results are listed below.  Recent Labs     05/27/25  0205   *     Latest ECHO  Results for orders placed during the hospital encounter of 12/21/21    Echo    Interpretation Summary  · Concentric hypertrophy and low normal systolic function.  · The estimated ejection fraction is 50%.  · Grade II left ventricular diastolic dysfunction.  · With normal right ventricular systolic function.  · Normal central venous  pressure (3 mmHg).  · The estimated PA systolic pressure is 53 mmHg.  · There is pulmonary hypertension.  · The sinuses of Valsalva is mildly dilated.    Current Heart Failure Medications  losartan tablet 100 mg, Daily, Oral  carvediloL tablet 3.125 mg, 2 times daily, Oral    Plan  - Monitor strict I&Os and daily weights.    - Place on telemetry  - Low sodium diet  - Place on fluid restriction of 2 L.   - Cardiology has been consulted  - The patient's volume status is improving and at baseline   Hypertensive urgency  Patient has a current diagnosis of hypertensive urgency (without evidence of end organ damage) which is controlled.  Latest blood pressure and vitals reviewed-   Temp:  [97.6 °F (36.4 °C)-98.2 °F (36.8 °C)]   Pulse:  [57-73]   Resp:  [16-20]   BP: (135-164)/()   SpO2:  [94 %-98 %] .   Patient currently off IV antihypertensives.   Home meds for hypertension were reviewed and noted below.   Hypertension Medications              amLODIPine (NORVASC) 10 MG tablet Take 1 tablet (10 mg total) by mouth once daily.    hydroCHLOROthiazide (HYDRODIURIL) 25 MG tablet Take 1 tablet (25 mg total) by mouth once daily.    losartan (COZAAR) 50 MG tablet Take 2 tablets (100 mg total) by mouth once daily.            Medication adjustment for hospital antihypertensives is as follows- resume HTN meds and utilize prn hydralazine     Will aim for controlled BP reduction by medications noted above. Monitor and mitigate end organ damage as indicated.  Hypokalemia  Patient's most recent potassium results are listed below.   Recent Labs     05/27/25  0205 05/27/25  0643 05/28/25  0422   K 2.9* 3.3* 2.9*     Plan  - Replete potassium per protocol  - Monitor potassium Daily  - Patient's hypokalemia is being treated    Elevated troponin  - trop trending down 0.143 > 0.067  - echo completed yesterday- Ejection fraction is approximately 45%. Grade II diastolic dysfunction.   - Stress test today     VTE Risk Mitigation (From  admission, onward)           Ordered     enoxaparin injection 40 mg  Daily         05/27/25 0449     IP VTE HIGH RISK PATIENT  Once         05/27/25 0449     Place sequential compression device  Until discontinued         05/27/25 0449                    Discharge Planning   BENJAMIN: 5/28/2025     Code Status: Full Code   Medical Readiness for Discharge Date:   Discharge Plan A: Home                        HUONG Gottlieb  Department of Hospital Medicine   [facility] - Telemetry ([facility])

## 2025-05-28 NOTE — HOSPITAL COURSE
5/28/2025-Patient seen and examined today, resting in bed. Feels much better. SOB resolved, diuresed over 4L. Did report some mild LH/dizziness with ambulation. No CP. BP improved. TTE with EF of 45%, DD. MPI stress test ordered.    5/29/2025-Pt seen and examined today. Labs reviewed. Stress test performed today and awaiting final read. Discussed if normal he should be good for DC. Discussed importance of med compliance and diet restrictions. Denies any symptoms at this time.     5/30/2025- Pt seen and examined today. [facility] with normal coronary arteries and EF approx 40%. Sitting in bed with no acute distress. No acute events overnight. Medication  adjusted yesterday post cath. Should be discahrged today. Discussed importance on medications on lifestyle modifications.

## 2025-05-29 LAB
ANION GAP (OHS): 12 MMOL/L (ref 8–16)
BUN SERPL-MCNC: 26 MG/DL (ref 6–20)
CALCIUM SERPL-MCNC: 8.9 MG/DL (ref 8.7–10.5)
CATH EF QUANTITATIVE: 40 %
CHLORIDE SERPL-SCNC: 102 MMOL/L (ref 95–110)
CO2 SERPL-SCNC: 25 MMOL/L (ref 23–29)
CREAT SERPL-MCNC: 1.2 MG/DL (ref 0.5–1.4)
CV STRESS BASE HR: 69 BPM
DIASTOLIC BLOOD PRESSURE: 112 MMHG
EJECTION FRACTION- HIGH: 73 %
END DIASTOLIC INDEX-HIGH: 165 ML/M2
END DIASTOLIC INDEX-LOW: 101 ML/M2
END SYSTOLIC INDEX-HIGH: 64 ML/M2
END SYSTOLIC INDEX-LOW: 28 ML/M2
GFR SERPLBLD CREATININE-BSD FMLA CKD-EPI: >60 ML/MIN/1.73/M2
GLUCOSE SERPL-MCNC: 91 MG/DL (ref 70–110)
NUC REST EJECTION FRACTION: 29
NUC STRESS EJECTION FRACTION: 32 %
OHS CV CPX 85 PERCENT MAX PREDICTED HEART RATE MALE: 151
OHS CV CPX MAX PREDICTED HEART RATE: 178
OHS CV CPX PATIENT IS FEMALE: 0
OHS CV CPX PATIENT IS MALE: 1
OHS CV CPX PEAK DIASTOLIC BLOOD PRESSURE: 112 MMHG
OHS CV CPX PEAK HEAR RATE: 93 BPM
OHS CV CPX PEAK RATE PRESSURE PRODUCT: NORMAL
OHS CV CPX PEAK SYSTOLIC BLOOD PRESSURE: 165 MMHG
OHS CV CPX PERCENT MAX PREDICTED HEART RATE ACHIEVED: 52
OHS CV CPX RATE PRESSURE PRODUCT PRESENTING: NORMAL
OHS CV INITIAL DOSE: 10.8 MCG/KG/MIN
OHS CV PEAK DOSE: 33.9 MCG/KG/MIN
POCT GLUCOSE: 151 MG/DL (ref 70–110)
POTASSIUM SERPL-SCNC: 3.3 MMOL/L (ref 3.5–5.1)
RETIRED EF AND QEF - SEE NOTES: 59 %
SODIUM SERPL-SCNC: 139 MMOL/L (ref 136–145)
SYSTOLIC BLOOD PRESSURE: 165 MMHG

## 2025-05-29 PROCEDURE — 25000003 PHARM REV CODE 250: Performed by: PHYSICIAN ASSISTANT

## 2025-05-29 PROCEDURE — 63600175 PHARM REV CODE 636 W HCPCS: Performed by: STUDENT IN AN ORGANIZED HEALTH CARE EDUCATION/TRAINING PROGRAM

## 2025-05-29 PROCEDURE — C1769 GUIDE WIRE: HCPCS | Performed by: INTERNAL MEDICINE

## 2025-05-29 PROCEDURE — B2111ZZ FLUOROSCOPY OF MULTIPLE CORONARY ARTERIES USING LOW OSMOLAR CONTRAST: ICD-10-PCS | Performed by: INTERNAL MEDICINE

## 2025-05-29 PROCEDURE — 99153 MOD SED SAME PHYS/QHP EA: CPT | Performed by: INTERNAL MEDICINE

## 2025-05-29 PROCEDURE — 21400001 HC TELEMETRY ROOM

## 2025-05-29 PROCEDURE — 36415 COLL VENOUS BLD VENIPUNCTURE: CPT | Performed by: NURSE PRACTITIONER

## 2025-05-29 PROCEDURE — 82310 ASSAY OF CALCIUM: CPT | Performed by: NURSE PRACTITIONER

## 2025-05-29 PROCEDURE — 4A023N8 MEASUREMENT OF CARDIAC SAMPLING AND PRESSURE, BILATERAL, PERCUTANEOUS APPROACH: ICD-10-PCS | Performed by: INTERNAL MEDICINE

## 2025-05-29 PROCEDURE — 99152 MOD SED SAME PHYS/QHP 5/>YRS: CPT | Mod: ,,, | Performed by: INTERNAL MEDICINE

## 2025-05-29 PROCEDURE — 25000003 PHARM REV CODE 250: Performed by: INTERNAL MEDICINE

## 2025-05-29 PROCEDURE — 25500020 PHARM REV CODE 255: Performed by: INTERNAL MEDICINE

## 2025-05-29 PROCEDURE — A9502 TC99M TETROFOSMIN: HCPCS | Performed by: STUDENT IN AN ORGANIZED HEALTH CARE EDUCATION/TRAINING PROGRAM

## 2025-05-29 PROCEDURE — 99232 SBSQ HOSP IP/OBS MODERATE 35: CPT | Mod: 25,,, | Performed by: INTERNAL MEDICINE

## 2025-05-29 PROCEDURE — 93460 R&L HRT ART/VENTRICLE ANGIO: CPT | Performed by: INTERNAL MEDICINE

## 2025-05-29 PROCEDURE — 99152 MOD SED SAME PHYS/QHP 5/>YRS: CPT | Performed by: INTERNAL MEDICINE

## 2025-05-29 PROCEDURE — 25000003 PHARM REV CODE 250: Performed by: HOSPITALIST

## 2025-05-29 PROCEDURE — C1751 CATH, INF, PER/CENT/MIDLINE: HCPCS | Performed by: INTERNAL MEDICINE

## 2025-05-29 PROCEDURE — C1894 INTRO/SHEATH, NON-LASER: HCPCS | Performed by: INTERNAL MEDICINE

## 2025-05-29 PROCEDURE — 93460 R&L HRT ART/VENTRICLE ANGIO: CPT | Mod: 26,,, | Performed by: INTERNAL MEDICINE

## 2025-05-29 PROCEDURE — 25000003 PHARM REV CODE 250

## 2025-05-29 PROCEDURE — 63600175 PHARM REV CODE 636 W HCPCS: Performed by: INTERNAL MEDICINE

## 2025-05-29 RX ORDER — CEFAZOLIN SODIUM 1 G/3ML
INJECTION, POWDER, FOR SOLUTION INTRAMUSCULAR; INTRAVENOUS
Status: DISCONTINUED | OUTPATIENT
Start: 2025-05-29 | End: 2025-05-29 | Stop reason: HOSPADM

## 2025-05-29 RX ORDER — DIPHENHYDRAMINE HYDROCHLORIDE 50 MG/ML
INJECTION, SOLUTION INTRAMUSCULAR; INTRAVENOUS
Status: DISCONTINUED | OUTPATIENT
Start: 2025-05-29 | End: 2025-05-29 | Stop reason: HOSPADM

## 2025-05-29 RX ORDER — HYDROCHLOROTHIAZIDE 25 MG/1
25 TABLET ORAL DAILY
Status: DISCONTINUED | OUTPATIENT
Start: 2025-05-30 | End: 2025-05-30 | Stop reason: HOSPADM

## 2025-05-29 RX ORDER — CARVEDILOL 6.25 MG/1
6.25 TABLET ORAL 2 TIMES DAILY
Status: DISCONTINUED | OUTPATIENT
Start: 2025-05-29 | End: 2025-05-30 | Stop reason: HOSPADM

## 2025-05-29 RX ORDER — NITROGLYCERIN 5 MG/ML
INJECTION, SOLUTION INTRAVENOUS
Status: DISCONTINUED | OUTPATIENT
Start: 2025-05-29 | End: 2025-05-29 | Stop reason: HOSPADM

## 2025-05-29 RX ORDER — REGADENOSON 0.08 MG/ML
0.4 INJECTION, SOLUTION INTRAVENOUS ONCE
Status: COMPLETED | OUTPATIENT
Start: 2025-05-29 | End: 2025-05-29

## 2025-05-29 RX ORDER — ONDANSETRON HYDROCHLORIDE 2 MG/ML
INJECTION, SOLUTION INTRAVENOUS
Status: DISCONTINUED | OUTPATIENT
Start: 2025-05-29 | End: 2025-05-29 | Stop reason: HOSPADM

## 2025-05-29 RX ORDER — MIDAZOLAM HYDROCHLORIDE 1 MG/ML
INJECTION INTRAMUSCULAR; INTRAVENOUS
Status: DISCONTINUED | OUTPATIENT
Start: 2025-05-29 | End: 2025-05-29 | Stop reason: HOSPADM

## 2025-05-29 RX ORDER — LIDOCAINE HYDROCHLORIDE 20 MG/ML
INJECTION, SOLUTION EPIDURAL; INFILTRATION; INTRACAUDAL; PERINEURAL
Status: DISCONTINUED | OUTPATIENT
Start: 2025-05-29 | End: 2025-05-29 | Stop reason: HOSPADM

## 2025-05-29 RX ORDER — VERAPAMIL HYDROCHLORIDE 2.5 MG/ML
INJECTION INTRAVENOUS
Status: DISCONTINUED | OUTPATIENT
Start: 2025-05-29 | End: 2025-05-29 | Stop reason: HOSPADM

## 2025-05-29 RX ORDER — ACETAMINOPHEN 325 MG/1
650 TABLET ORAL EVERY 4 HOURS PRN
Status: DISCONTINUED | OUTPATIENT
Start: 2025-05-29 | End: 2025-05-30 | Stop reason: HOSPADM

## 2025-05-29 RX ORDER — LABETALOL HYDROCHLORIDE 5 MG/ML
10 INJECTION, SOLUTION INTRAVENOUS EVERY 6 HOURS PRN
Status: DISCONTINUED | OUTPATIENT
Start: 2025-05-29 | End: 2025-05-30 | Stop reason: HOSPADM

## 2025-05-29 RX ORDER — FENTANYL CITRATE 50 UG/ML
INJECTION, SOLUTION INTRAMUSCULAR; INTRAVENOUS
Status: DISCONTINUED | OUTPATIENT
Start: 2025-05-29 | End: 2025-05-29 | Stop reason: HOSPADM

## 2025-05-29 RX ORDER — HEPARIN SODIUM 1000 [USP'U]/ML
INJECTION INTRAVENOUS; SUBCUTANEOUS
Status: DISCONTINUED | OUTPATIENT
Start: 2025-05-29 | End: 2025-05-29 | Stop reason: HOSPADM

## 2025-05-29 RX ORDER — SODIUM CHLORIDE 9 MG/ML
INJECTION, SOLUTION INTRAVENOUS
Status: DISCONTINUED | OUTPATIENT
Start: 2025-05-29 | End: 2025-05-30 | Stop reason: HOSPADM

## 2025-05-29 RX ORDER — SODIUM CHLORIDE 9 MG/ML
INJECTION, SOLUTION INTRAVENOUS CONTINUOUS
Status: ACTIVE | OUTPATIENT
Start: 2025-05-29 | End: 2025-05-29

## 2025-05-29 RX ORDER — ONDANSETRON 8 MG/1
8 TABLET, ORALLY DISINTEGRATING ORAL EVERY 8 HOURS PRN
Status: DISCONTINUED | OUTPATIENT
Start: 2025-05-29 | End: 2025-05-30 | Stop reason: HOSPADM

## 2025-05-29 RX ADMIN — SODIUM CHLORIDE: 9 INJECTION, SOLUTION INTRAVENOUS at 05:05

## 2025-05-29 RX ADMIN — REGADENOSON 0.4 MG: 0.08 INJECTION, SOLUTION INTRAVENOUS at 10:05

## 2025-05-29 RX ADMIN — CARVEDILOL 3.12 MG: 3.12 TABLET, FILM COATED ORAL at 10:05

## 2025-05-29 RX ADMIN — TETROFOSMIN 10.8 MILLICURIE: 1.38 INJECTION, POWDER, LYOPHILIZED, FOR SOLUTION INTRAVENOUS at 07:05

## 2025-05-29 RX ADMIN — ACETAMINOPHEN 650 MG: 325 TABLET ORAL at 12:05

## 2025-05-29 RX ADMIN — POTASSIUM CHLORIDE 40 MEQ: 1500 TABLET, EXTENDED RELEASE ORAL at 10:05

## 2025-05-29 RX ADMIN — LOSARTAN POTASSIUM 100 MG: 50 TABLET, FILM COATED ORAL at 10:05

## 2025-05-29 RX ADMIN — TETROFOSMIN 33.9 MILLICURIE: 1.38 INJECTION, POWDER, LYOPHILIZED, FOR SOLUTION INTRAVENOUS at 10:05

## 2025-05-29 RX ADMIN — CARVEDILOL 6.25 MG: 6.25 TABLET, FILM COATED ORAL at 08:05

## 2025-05-29 NOTE — PLAN OF CARE
[id number]/[id number] JOSE J  Henry Harmon is a 40 y.o.male admitted on 5/27/2025 for CHF exacerbation   Code Status: Full Code MRN: [id number]   Review of patient's allergies indicates:   Allergen Reactions    Penicillins Rash     Past Medical History:   Diagnosis Date    Hypertension       PRN meds    acetaminophen, 650 mg, Q8H PRN  acetaminophen, 650 mg, Q4H PRN  nitroGLYCERIN, 0.4 mg, Q5 Min PRN  ondansetron, 8 mg, Q8H PRN  polyethylene glycol, 17 g, Daily PRN  promethazine, 25 mg, Q6H PRN  sodium chloride 0.9%, 10 mL, PRN      Chart check completed. Will continue plan of care.         Trini Coma Scale Score: 15     Lead Monitored: Lead II Rhythm: normal sinus rhythm    Cardiac/Telemetry Box Number: [id number]  VTE Core Measure: Pharmacological prophylaxis initiated/maintained Last Bowel Movement: 05/28/25  Diet Low Sodium (2 gm)     Fredi Score: 22  Fall Risk Score: 7  Accucheck []   Freq?      Lines/Drains/Airways       Peripheral Intravenous Line  Duration                  Peripheral IV - Single Lumen 05/27/25 0205 18 G Anterior;Left Forearm 2 days

## 2025-05-29 NOTE — ASSESSMENT & PLAN NOTE
Patient's most recent potassium results are listed below.   Recent Labs     05/27/25  0643 05/28/25  0422 05/29/25  0436   K 3.3* 2.9* 3.3*     Plan  - Replete potassium per protocol  - Monitor potassium Daily  - Patient's hypokalemia is being treated

## 2025-05-29 NOTE — SUBJECTIVE & OBJECTIVE
Review of Systems   Cardiovascular:  Negative for chest pain, dyspnea on exertion, leg swelling, near-syncope, orthopnea, palpitations and syncope.   Respiratory:  Negative for cough, shortness of breath and wheezing.    Neurological:  Negative for dizziness and light-headedness.     Objective:     Vital Signs (Most Recent):  Temp: 98.2 °F (36.8 °C) (05/29/25 1213)  Pulse: 76 (05/29/25 1251)  Resp: 18 (05/29/25 1212)  BP: (!) 160/97 (05/29/25 1251)  SpO2: (!) 94 % (05/29/25 1212) Vital Signs (24h Range):  Temp:  [97.8 °F (36.6 °C)-98.8 °F (37.1 °C)] 98.2 °F (36.8 °C)  Pulse:  [58-93] 76  Resp:  [16-18] 18  SpO2:  [91 %-99 %] 94 %  BP: (147-185)/() 160/97     Weight: 94.3 kg (208 lb)  Body mass index is 34.61 kg/m².     SpO2: (!) 94 %         Intake/Output Summary (Last 24 hours) at 5/29/2025 1338  Last data filed at 5/29/2025 1255  Gross per 24 hour   Intake --   Output 825 ml   Net -825 ml       Lines/Drains/Airways       Peripheral Intravenous Line  Duration                  Peripheral IV - Single Lumen 05/27/25 0205 18 G Anterior;Left Forearm 2 days                       Physical Exam  Vitals reviewed.   Constitutional:       Appearance: Normal appearance. He is obese.   HENT:      Head: Normocephalic and atraumatic.      Nose: Nose normal.   Eyes:      Extraocular Movements: Extraocular movements intact.   Cardiovascular:      Rate and Rhythm: Normal rate.   Pulmonary:      Effort: Pulmonary effort is normal.   Abdominal:      Palpations: Abdomen is soft.   Musculoskeletal:         General: Normal range of motion.      Cervical back: Normal range of motion and neck supple.      Right lower leg: No edema.      Left lower leg: No edema.   Skin:     General: Skin is warm and dry.   Neurological:      Mental Status: He is alert and oriented to person, place, and time. Mental status is at baseline.   Psychiatric:         Mood and Affect: Mood normal.         Behavior: Behavior normal.            Significant  "Labs: BMP:   Recent Labs   Lab 05/28/25  0422 05/29/25  0436   GLU 91 91    139   K 2.9* 3.3*    102   CO2 28 25   BUN 22* 26*   CREATININE 1.4 1.2   CALCIUM 8.8 8.9   , CMP   Recent Labs   Lab 05/28/25  0422 05/29/25  0436    139   K 2.9* 3.3*    102   CO2 28 25   GLU 91 91   BUN 22* 26*   CREATININE 1.4 1.2   CALCIUM 8.8 8.9   ANIONGAP 11 12   , CBC No results for input(s): "WBC", "HGB", "HCT", "PLT" in the last 48 hours., Lipid Panel No results for input(s): "CHOL", "HDL", "LDLCALC", "TRIG", "CHOLHDL" in the last 48 hours., and Troponin No results for input(s): "TROPONINIHS" in the last 48 hours.    Significant Imaging: Echocardiogram: Transthoracic echo (TTE) complete (Cupid Only):   Results for orders placed or performed during the hospital encounter of 05/27/25   Echo   Result Value Ref Range    BSA 2.14 m2    LA WIDTH 4.2 cm    RA Width 4.5 cm    A4C EF 43 %    LVOT stroke volume 93.4 cm3    LVIDd 5.8 3.5 - 6.0 cm    LV Systolic Volume 113 mL    LV Systolic Volume Index 54.9 mL/m2    LVIDs 4.9 (A) 2.1 - 4.0 cm    LV ESV A2C 114.04 mL    LV Diastolic Volume 167 mL    LV ESV A4C 67.54 mL    LV Diastolic Volume Index 81.07 mL/m2    LV EDV A4C 134.14 mL    Left Ventricular End Systolic Volume by Teichholz Method 113.29 mL    Left Ventricular End Diastolic Volume by Teichholz Method 166.68 mL    IVS 1.3 (A) 0.6 - 1.1 cm    LVOT diameter 2.3 cm    LVOT area 4.2 cm2    FS 15.5 (A) 28 - 44 %    Left Ventricle Relative Wall Thickness 0.62 cm    PW 1.8 (A) 0.6 - 1.1 cm    LV mass 424.8 g    LV Mass Index 206.2 g/m2    MV Peak E Keon 1.00 m/s    TDI LATERAL 0.03 m/s    TDI SEPTAL 0.05 m/s    E/E' ratio 25 m/s    MV Peak A Keon 1.14 m/s    TR Max Keon 2.8 m/s    E/A ratio 0.88     IVRT 86 msec    E wave deceleration time 142 msec    LV SEPTAL E/E' RATIO 20.0 m/s    JOSH 53 mL/m2    LV LATERAL E/E' RATIO 33.3 m/s    LA Vol 108 cm3    LVOT peak keon 1.3 m/s    Left Ventricular Outflow Tract Mean " Velocity 0.93 cm/s    Left Ventricular Outflow Tract Mean Gradient 3.84 mmHg    RV- caraballo basal diam 3.9 cm    RVOT peak VTI 14.7 cm    TAPSE 2.9 cm    RV/LV Ratio 0.67 cm    LA size 4.3 cm    Left Atrium Minor Axis 6.9 cm    Left Atrium Major Axis 7.2 cm    LA Vol (MOD) 88 mL    JOSH (MOD) 43 mL/m2    RA Major Axis 6.52 cm    AV regurgitation pressure 1/2 time 454 ms    AR Max Yandel 5.37 m/s    AV mean gradient 11 mmHg    AV peak gradient 16 mmHg    Ao peak yandel 2.0 m/s    Ao VTI 35.1 cm    LVOT peak VTI 22.5 cm    AV valve area 2.7 cm²    AV Velocity Ratio 0.65     AV index (prosthetic) 0.64     GODWIN by Velocity Ratio 2.7 cm²    MV mean gradient 4 mmHg    MV peak gradient 8 mmHg    MV stenosis pressure 1/2 time 42.72 ms    MV valve area p 1/2 method 5.15 cm2    MV valve area by continuity eq 3.27 cm2    MV VTI 28.6 cm    Triscuspid Valve Regurgitation Peak Gradient 30 mmHg    PV mean gradient 2 mmHg    PV PEAK VELOCITY 1.49 m/s    PV peak gradient 9 mmHg    Pulmonary Valve Mean Velocity 0.92 m/s    RVOT peak yandel 0.90 m/s    Ao root annulus 4.0 cm    STJ 2.6 cm    Ascending aorta 3.9 cm    ASI 1.9 cm/m2    IVC diameter 1.89 cm    Mean e' 0.04 m/s    ZLVIDS 1.99     ZLVIDD -0.70     LA area A4C 23.40 cm2    LA area A2C 29.94 cm2    EF 45 %    TV resting pulmonary artery pressure 34 mmHg    RV TB RVSP 6 mmHg    Est. RA pres 3 mmHg    Narrative      Left Ventricle: The left ventricle is normal in size. Mildly increased   wall thickness. There is concentric hypertrophy. There is mildly reduced   systolic function with a visually estimated ejection fraction of 40 - 50%.   Ejection fraction is approximately 45%. Grade II diastolic dysfunction.    Right Ventricle: The right ventricle is normal in size Wall thickness   is normal. Systolic function is normal.    Left Atrium: The left atrium is severely dilated    Aortic Valve: There is mild aortic regurgitation.    Pulmonary Artery: The estimated pulmonary artery systolic  pressure is   34 mmHg.    IVC/SVC: Normal venous pressure at 3 mmHg.     , EKG: reviewed, Stress Test: reviewed, and X-Ray: CXR: X-Ray Chest 1 View (CXR): No results found for this visit on 05/27/25.

## 2025-05-29 NOTE — PLAN OF CARE
POC reviewed with pt. Pt verbalizes understanding of POC. No questions at this time.  AAOx3. NADN.  SB/NSR on cardiac monitor.  Nuclear stress test R/S due to patient ate lunch.   NPO after midnight.  K+ 2.9 - gave po K+.  Pt remains free of falls.  No complaints at this time.  Safety measures in place. Will continue to monitor.  Informed pt to call for assistance before getting up. Pt verbalizes understanding.  Hourly rounding and chart check complete.

## 2025-05-29 NOTE — PLAN OF CARE
Problem: Adult Inpatient Plan of Care  Goal: Plan of Care Review  Outcome: Progressing  Goal: Patient-Specific Goal (Individualized)  Outcome: Progressing  Goal: Absence of Hospital-Acquired Illness or Injury  Outcome: Progressing  Goal: Optimal Comfort and Wellbeing  Outcome: Progressing  Goal: Readiness for Transition of Care  Outcome: Progressing     Problem: Heart Failure  Goal: Optimal Coping  Outcome: Progressing  Goal: Optimal Cardiac Output  Outcome: Progressing  Goal: Stable Heart Rate and Rhythm  Outcome: Progressing  Goal: Optimal Functional Ability  Outcome: Progressing  Goal: Fluid and Electrolyte Balance  Outcome: Progressing  Goal: Improved Oral Intake  Outcome: Progressing  Goal: Effective Oxygenation and Ventilation  Outcome: Progressing  Goal: Effective Breathing Pattern During Sleep  Outcome: Progressing     Problem: Heart Failure  Goal: Optimal Cardiac Output  Outcome: Progressing   POC reviewed with pt. Pt verbalizes understanding of POC. No questions at this time.  AAOx4 NADN.  NSR on cardiac monitor.  Pt remains free of falls.  No complaints at this time.  Safety measures in place. Will continue to monitor.  Informed pt to call for assistance before getting up. Pt verbalizes understanding.  Hourly rounding and chart check complete.

## 2025-05-29 NOTE — ASSESSMENT & PLAN NOTE
Patient has a current diagnosis of hypertensive urgency (without evidence of end organ damage) which is controlled.  Latest blood pressure and vitals reviewed-   Temp:  [97.8 °F (36.6 °C)-98.8 °F (37.1 °C)]   Pulse:  [58-93]   Resp:  [16-18]   BP: (147-185)/()   SpO2:  [91 %-99 %] .   Patient currently off IV antihypertensives.   Home meds for hypertension were reviewed and noted below.   Hypertension Medications              amLODIPine (NORVASC) 10 MG tablet Take 1 tablet (10 mg total) by mouth once daily.    hydroCHLOROthiazide (HYDRODIURIL) 25 MG tablet Take 1 tablet (25 mg total) by mouth once daily.    losartan (COZAAR) 50 MG tablet Take 2 tablets (100 mg total) by mouth once daily.            Medication adjustment for hospital antihypertensives is as follows- resume HTN meds and utilize prn hydralazine     Will aim for controlled BP reduction by medications noted above. Monitor and mitigate end organ damage as indicated.

## 2025-05-29 NOTE — ASSESSMENT & PLAN NOTE
-Presents with decompensated CHF in setting of uncontrolled HTN  -Continue IV diuresis  -ARB added  -Will plan to add Coreg tmw  -TTE pending  -Strict I's/O's  -Counseled on low salt diet    5/28/2025  -Much improved  -Will consider po Lasix tmw  -TTE with EF of 45%  -Continue Losartan, Coreg added today  -MPI stress test pending    5/29/2025  Symptoms improved   Continue losartan and coreg  Can add PO lasix 20 mg daily upon DC   Stress test today , follow up  results

## 2025-05-29 NOTE — ASSESSMENT & PLAN NOTE
-Due to medication non-compliance  -ARB initiated  -Diurese   -Will plan to add BB tomorrow  -TTE pending    5/28/2025  -BP improved  -Continue ARB  -Coreg 3.125 mg BID added  -TTE with EF of 45%  -MPI stress test pending    5/29/2025  Continue with current meds

## 2025-05-29 NOTE — DISCHARGE INSTRUCTIONS
"Post-op Heart Catheterization    1. DIET: It is advisable for you to follow a diet that limits the intake of salt, sugar, saturated fats and cholesterol.     2. DRIVING: Due to sedation you received during your procedure, DO NOT drive or operate machinery for 24 hours. Avoid making critical decisions or signing legal documents until tomorrow.    3. ACTIVITY: AVOID activities that require bending of the affected arm/wrist for 3 days and submerging the site in water for 3 days.   REMOVE the dressing before bedtime Friday and shower.    Wash with soap and water in hand; do not use wash cloth.  Apply a bandaid to site after shower if desired.  No ointments, lotions, powders, colognes, ... for 3 days.  WEAR wrist immobilizer until Saturday morning.  No pushing, pulling, or lifting more than 10 pounds for 3 days  No riding motorcycles, riding lawn mowers, using push mowers or weed eaters... for 3 days.  You may RESUME your normal activities or prescribed exercise program as instructed by your physician after 3 days.                                                                                                       4. WOUND CARE: It is not unusual to have a small amount of bruising to appear at or near the puncture site. It is also common to have a tender "knot" develop beneath the skin at the puncture site of the wrist/arm. This is usually scar tissue and is not a cause for concern or alarm. This tender knot may take several weeks to fully resolve. The bruise will usually spread over several days. However, if the lump gets bigger, call your doctor immediately.    5. DISCOMFORT: For general discomfort at the puncture site, you may take 1 or 2 Acetaminophen (Tylenol) tablets every 4 - 6 hours as needed. (Do not take more than 4000 mg a day)    6. SIGNS AND SYMPTOMS TO REPORT:  Call your physician immediately if any of the following occur:                                            1. Loss of feeling, warmth or color to " "the affected arm/wrist                                                                                                            2. Mild bleeding from the site                 3. Pain that is sudden, sharp or persistent in the affected arm/wrist                 4. Swelling or a change in "lump" size, increased redness or drainage at the puncture site                                                                               5. High fever (101 degrees or higher)    7. GO TO  THE EMERGENCY ROOM OR CALL 911 IF YOU HAVE: Chest pains or discomforts not relieved with 3 nitroglycerin doses (sublingual tablets or spray), numbness or severe pain of limb, if your limb becomes cold or discolored or if you develop uncontrolled bleeding from the puncture site (quickly apply firm, direct pressure above the site).  "

## 2025-05-29 NOTE — PROGRESS NOTES
Formerly [facility] - Telemetry ([facility])  [facility] Medicine  Progress Note    Patient Name: Henry Harmon  MRN: [id number]  Patient Class: IP- Inpatient   Admission Date: 5/27/2025  Length of Stay: 2 days  Attending Physician: Spenser Lyons MD  Primary Care Provider: Catie, Primary Doctor        Subjective     Principal Problem:CHF exacerbation        HPI:  Henry Harmon is a 40 y.o. male with a PMH  has a past medical history of Hypertension.presented to the Emergency Department for evaluation of SOB which began 1 day ago. Pt reports L-sided chest pain and his sxs have worsened today that prompted his ER visit. Pt reports that the doctor he sees took him off of his BP medications years ago. Symptoms are constant and moderate in severity.  Patient reports his shortness of breath worsens with exertion and with lying flat.  Patient states he has been having asleep with 2 pillows in mostly in the upright position. Shortness of breath improves when not exerting himself.  Associated sxs include nonproductive cough.  Denies history of heart failure.  Patient denies any nausea. No prior Tx specified.  No further complaints or concerns at this time.      ER workup revealed CBC to be unremarkable.  CMP revealed potassium of 2.9.  .  Troponin 0.110.  Lactic acid normal.  TSH 4.238.  Free T4 0.99.  Urine and drug UA negative.  Flu and COVID negative.  Chest x-ray revealed cardiomegaly with evidence of bilateral pulmonary edema per wet read.  EKG revealed normal sinus rhythm with biatrial enlargement and right word axis with ventricular rate of 97 beats per minute and a QT/QTC of 386/490.  BP initially elevated to 233/149 in ER, but improved to of 132/70 in ER after receiving 60 mg Lasix IV, 10 mg hydralazine IV, 20 mg hydralazine IV.  Patient also received 50 mEq potassium bicarbonate disintegrating tablet in addition to 325 mg aspirin 650 mg Tylenol in ER.  Hospital Medicine consulted to admit patient for further  workup for CHF.  Patient in agreement with treatment plan.  Patient admitted under inpatient status.    PCP: Catie, Primary Doctor      Overview/Hospital Course:  5/28- Echo showed EF approximately 45%. Grade II diastolic dysfunction. Started on ARB and will start BB today. Patient remains free of CP/ SOB.  Diuresed well over night (2L of output).  BP remains normotensive today, other vital signs stable. Stress test scheduled for today. Hypokalemia noted- continuing with PO K regimen.     5/29- Stress test rescheduled from yesterday to today. Patient remains free of CP/ SOB. Hypokalemia improving. Cardiology added Coreg and Losartan added to HTN medications, recommends adding PO lasix 20mg daily upon DC.       Interval History: Free of chest pain. SOB.     Review of Systems   Constitutional:  Negative for activity change, appetite change, diaphoresis and fever.   HENT:  Negative for congestion.    Eyes:  Negative for discharge.   Respiratory:  Negative for chest tightness, shortness of breath and wheezing.    Cardiovascular:  Negative for chest pain, palpitations and leg swelling.   Gastrointestinal:  Negative for abdominal distention, abdominal pain, constipation and diarrhea.   Genitourinary:  Negative for flank pain and urgency.   Musculoskeletal:  Negative for back pain and myalgias.   Skin:  Negative for color change and pallor.   Allergic/Immunologic: Negative for environmental allergies.   Neurological:  Negative for dizziness, tremors, syncope and weakness.   Psychiatric/Behavioral:  Negative for agitation, behavioral problems and confusion.      Objective:     Vital Signs (Most Recent):  Temp: 97.8 °F (36.6 °C) (05/29/25 0831)  Pulse: 69 (05/29/25 1102)  Resp: 18 (05/29/25 0831)  BP: (!) 165/112 (05/29/25 1102)  SpO2: (!) 91 % (05/29/25 0831) Vital Signs (24h Range):  Temp:  [97.8 °F (36.6 °C)-98.8 °F (37.1 °C)] 97.8 °F (36.6 °C)  Pulse:  [58-93] 69  Resp:  [16-18] 18  SpO2:  [91 %-99 %] 91 %  BP:  (135-179)/() 165/112     Weight: 94.3 kg (208 lb)  Body mass index is 34.61 kg/m².    Intake/Output Summary (Last 24 hours) at 5/29/2025 1114  Last data filed at 5/29/2025 1044  Gross per 24 hour   Intake 240 ml   Output 725 ml   Net -485 ml         Physical Exam  Constitutional:       Appearance: He is normal weight.   HENT:      Head: Atraumatic.      Nose: Nose normal.      Mouth/Throat:      Mouth: Mucous membranes are moist.      Pharynx: Oropharynx is clear.   Cardiovascular:      Rate and Rhythm: Normal rate and regular rhythm.      Pulses: Normal pulses.      Heart sounds: Normal heart sounds.   Pulmonary:      Effort: Pulmonary effort is normal.      Breath sounds: Normal breath sounds.   Abdominal:      General: Bowel sounds are normal.      Palpations: Abdomen is soft.   Musculoskeletal:         General: Normal range of motion.      Cervical back: Normal range of motion.   Skin:     General: Skin is warm and dry.      Capillary Refill: Capillary refill takes less than 2 seconds.   Neurological:      Mental Status: He is alert and oriented to person, place, and time.               Significant Labs:   Results for orders placed or performed during the hospital encounter of 05/27/25   EKG 12-lead    Collection Time: 05/27/25  1:53 AM   Result Value Ref Range    QRS Duration 98 ms    OHS QTC Calculation 490 ms   Blood culture #1 **CANNOT BE ORDERED STAT**    Collection Time: 05/27/25  2:05 AM    Specimen: Peripheral, Forearm, Left; Blood   Result Value Ref Range    Blood Culture No Growth After 36 Hours    Hepatitis C Antibody    Collection Time: 05/27/25  2:05 AM   Result Value Ref Range    Hep C Ab Interp Negative Negative   HCV Virus Hold Specimen    Collection Time: 05/27/25  2:05 AM   Result Value Ref Range    Extra Tube Hold for add-ons.    HIV 1/2 Ag/Ab (4th Gen)    Collection Time: 05/27/25  2:05 AM   Result Value Ref Range    HIV 1/2 Ag/Ab Negative Negative   Comprehensive metabolic panel     Collection Time: 05/27/25  2:05 AM   Result Value Ref Range    Sodium 138 136 - 145 mmol/L    Potassium 2.9 (L) 3.5 - 5.1 mmol/L    Chloride 103 95 - 110 mmol/L    CO2 23 23 - 29 mmol/L    Glucose 96 70 - 110 mg/dL    BUN 13 6 - 20 mg/dL    Creatinine 1.0 0.5 - 1.4 mg/dL    Calcium 8.5 (L) 8.7 - 10.5 mg/dL    Protein Total 7.2 6.0 - 8.4 gm/dL    Albumin 3.8 3.5 - 5.2 g/dL    Bilirubin Total 0.6 0.1 - 1.0 mg/dL    ALP 87 40 - 150 unit/L    AST 26 11 - 45 unit/L    ALT 30 10 - 44 unit/L    Anion Gap 12 8 - 16 mmol/L    eGFR >60 >60 mL/min/1.73/m2   Troponin I    Collection Time: 05/27/25  2:05 AM   Result Value Ref Range    Troponin-I 0.110 (H) <=0.026 ng/mL   Brain natriuretic peptide    Collection Time: 05/27/25  2:05 AM   Result Value Ref Range     (H) 0 - 99 pg/mL   Lactic Acid, Plasma    Collection Time: 05/27/25  2:05 AM   Result Value Ref Range    Lactic Acid Level 1.1 0.5 - 2.2 mmol/L   CBC with Differential    Collection Time: 05/27/25  2:05 AM   Result Value Ref Range    WBC 10.43 3.90 - 12.70 K/uL    RBC 5.21 4.60 - 6.20 M/uL    HGB 15.7 14.0 - 18.0 gm/dL    HCT 45.8 40.0 - 54.0 %    MCV 88 82 - 98 fL    MCH 30.1 27.0 - 31.0 pg    MCHC 34.3 32.0 - 36.0 g/dL    RDW 13.6 11.5 - 14.5 %    Platelet Count 233 150 - 450 K/uL    MPV 9.8 9.2 - 12.9 fL    Nucleated RBC 0 <=0 /100 WBC    Neut % 58.2 38 - 73 %    Lymph % 29.5 18 - 48 %    Mono % 7.3 4 - 15 %    Eos % 3.5 <=8 %    Basophil % 0.7 <=1.9 %    Imm Grans % 0.8 (H) 0.0 - 0.5 %    Neut # 6.07 1.8 - 7.7 K/uL    Lymph # 3.08 1 - 4.8 K/uL    Mono # 0.76 0.3 - 1 K/uL    Eos # 0.37 <=0.5 K/uL    Baso # 0.07 <=0.2 K/uL    Imm Grans # 0.08 (H) 0.00 - 0.04 K/uL   TSH    Collection Time: 05/27/25  2:05 AM   Result Value Ref Range    TSH 4.238 (H) 0.400 - 4.000 uIU/mL    Free T4 0.99 0.71 - 1.51 ng/dL   Light Blue Top Hold    Collection Time: 05/27/25  2:05 AM   Result Value Ref Range    Extra Tube Hold for add-ons.    Light Green Top Hold    Collection Time:  05/27/25  2:05 AM   Result Value Ref Range    Extra Tube Hold for add-ons.    Magnesium    Collection Time: 05/27/25  2:05 AM   Result Value Ref Range    Magnesium  1.8 1.6 - 2.6 mg/dL   Blood culture #2 **CANNOT BE ORDERED STAT**    Collection Time: 05/27/25  2:08 AM    Specimen: Peripheral, Forearm, Right; Blood   Result Value Ref Range    Blood Culture No Growth After 36 Hours    Influenza A & B by Molecular    Collection Time: 05/27/25  2:12 AM    Specimen: Nasal Swab   Result Value Ref Range    INFLUENZA A MOLECULAR Negative Negative    INFLUENZA B MOLECULAR  Negative Negative   COVID-19 Rapid Screening    Collection Time: 05/27/25  2:12 AM   Result Value Ref Range    SARS COV-2 Molecular Negative Negative   Drug screen panel, emergency    Collection Time: 05/27/25  2:45 AM   Result Value Ref Range    Benzodiazepine, Urine Negative Negative    Methadone, Urine Negative Negative    Cocaine, Urine Negative Negative    Opiates, Urine Negative Negative    Barbiturates, Urine Negative Negative    Amphetamines, Urine Negative Negative    THC Negative Negative    Phencyclidine, Urine Negative Negative    Urine Creatinine 31.4 23.0 - 375.0 mg/dL   Urinalysis, Reflex to Urine Culture Urine, Clean Catch    Collection Time: 05/27/25  2:45 AM    Specimen: Urine, Clean Catch   Result Value Ref Range    Color, UA Colorless (A) Straw, Roxy, Yellow, Light-Orange    Appearance, UA Clear Clear    pH, UA 7.0 5.0 - 8.0    Spec Grav UA 1.010 1.005 - 1.030    Protein, UA 1+ (A) Negative    Glucose, UA Negative Negative    Ketones, UA Negative Negative    Bilirubin, UA Negative Negative    Blood, UA Negative Negative    Nitrites, UA Negative Negative    Urobilinogen, UA Negative <2.0 EU/dL    Leukocyte Esterase, UA Negative Negative   GREY TOP URINE HOLD    Collection Time: 05/27/25  2:45 AM   Result Value Ref Range    Extra Tube Hold for add-ons.    Urinalysis Microscopic    Collection Time: 05/27/25  2:45 AM   Result Value Ref  Range    RBC, UA 0 0 - 4 /HPF    WBC, UA 0 0 - 5 /HPF    WBC Clumps, UA None None, Rare    Bacteria, UA None None, Rare, Occasional /HPF    Yeast, UA None None /HPF    Squamous Epithelial Cells, UA 0 /HPF    Non-Squamous Epithelial Cells 0 <=0 /HPF    Hyaline Casts, UA 1 0 - 1 /LPF    Epithelial Casts 0 None /lpf    WBC Casts 0 None /LPF    RBC Casts 0 None /LPF    Granular Casts 0 None  /LPF    Fatty Casts, UA 0 None /LPF    Waxy Casts, UA 0 None /lpf    Triple Phosphate Crystals, UA None None, Rare, Occasional, Few, Moderate    Calcium Oxalate Crystals, UA None None, Rare, Occasional, Few, Moderate    Calcium Phosphate Crystal None Rare, None, Moderate, Few, Occasional    Calcium Carbonate Crystal None Occasional, Rare, Few, None, Moderate    Ammonium Urate Crystals None Moderate, Occasional, Rare, Few, None    Uric Acid Crystals, UA None None, Rare, Occasional, Few, Moderate    Amorphous, UA None None, Occasional, Few, Moderate, Rare    Unclassified Crystals None None, Rare, Occasional, Few, Moderate    Trichomonas, UA None None /HPF    Other, UA None None    Microscopic Comment     Hemoglobin A1C    Collection Time: 05/27/25  6:43 AM   Result Value Ref Range    Hemoglobin A1c 5.3 4.0 - 5.6 %    Estimated Average Glucose 105 68 - 131 mg/dL   Basic metabolic panel    Collection Time: 05/27/25  6:43 AM   Result Value Ref Range    Sodium 138 136 - 145 mmol/L    Potassium 3.3 (L) 3.5 - 5.1 mmol/L    Chloride 102 95 - 110 mmol/L    CO2 25 23 - 29 mmol/L    Glucose 112 (H) 70 - 110 mg/dL    BUN 13 6 - 20 mg/dL    Creatinine 1.0 0.5 - 1.4 mg/dL    Calcium 8.7 8.7 - 10.5 mg/dL    Anion Gap 11 8 - 16 mmol/L    eGFR >60 >60 mL/min/1.73/m2   Troponin I    Collection Time: 05/27/25  6:43 AM   Result Value Ref Range    Troponin-I 0.143 (H) <=0.026 ng/mL   Echo    Collection Time: 05/27/25  8:56 AM   Result Value Ref Range    BSA 2.14 m2    LA WIDTH 4.2 cm    RA Width 4.5 cm    A4C EF 43 %    LVOT stroke volume 93.4 cm3     LVIDd 5.8 3.5 - 6.0 cm    LV Systolic Volume 113 mL    LV Systolic Volume Index 54.9 mL/m2    LVIDs 4.9 (A) 2.1 - 4.0 cm    LV ESV A2C 114.04 mL    LV Diastolic Volume 167 mL    LV ESV A4C 67.54 mL    LV Diastolic Volume Index 81.07 mL/m2    LV EDV A4C 134.14 mL    Left Ventricular End Systolic Volume by Teichholz Method 113.29 mL    Left Ventricular End Diastolic Volume by Teichholz Method 166.68 mL    IVS 1.3 (A) 0.6 - 1.1 cm    LVOT diameter 2.3 cm    LVOT area 4.2 cm2    FS 15.5 (A) 28 - 44 %    Left Ventricle Relative Wall Thickness 0.62 cm    PW 1.8 (A) 0.6 - 1.1 cm    LV mass 424.8 g    LV Mass Index 206.2 g/m2    MV Peak E Keon 1.00 m/s    TDI LATERAL 0.03 m/s    TDI SEPTAL 0.05 m/s    E/E' ratio 25 m/s    MV Peak A Keon 1.14 m/s    TR Max Keon 2.8 m/s    E/A ratio 0.88     IVRT 86 msec    E wave deceleration time 142 msec    LV SEPTAL E/E' RATIO 20.0 m/s    JOSH 53 mL/m2    LV LATERAL E/E' RATIO 33.3 m/s    LA Vol 108 cm3    LVOT peak keon 1.3 m/s    Left Ventricular Outflow Tract Mean Velocity 0.93 cm/s    Left Ventricular Outflow Tract Mean Gradient 3.84 mmHg    RV- caraballo basal diam 3.9 cm    RVOT peak VTI 14.7 cm    TAPSE 2.9 cm    RV/LV Ratio 0.67 cm    LA size 4.3 cm    Left Atrium Minor Axis 6.9 cm    Left Atrium Major Axis 7.2 cm    LA Vol (MOD) 88 mL    JOSH (MOD) 43 mL/m2    RA Major Axis 6.52 cm    AV regurgitation pressure 1/2 time 454 ms    AR Max Keon 5.37 m/s    AV mean gradient 11 mmHg    AV peak gradient 16 mmHg    Ao peak keon 2.0 m/s    Ao VTI 35.1 cm    LVOT peak VTI 22.5 cm    AV valve area 2.7 cm²    AV Velocity Ratio 0.65     AV index (prosthetic) 0.64     GODWIN by Velocity Ratio 2.7 cm²    MV mean gradient 4 mmHg    MV peak gradient 8 mmHg    MV stenosis pressure 1/2 time 42.72 ms    MV valve area p 1/2 method 5.15 cm2    MV valve area by continuity eq 3.27 cm2    MV VTI 28.6 cm    Triscuspid Valve Regurgitation Peak Gradient 30 mmHg    PV mean gradient 2 mmHg    PV PEAK VELOCITY 1.49 m/s     PV peak gradient 9 mmHg    Pulmonary Valve Mean Velocity 0.92 m/s    RVOT peak yandel 0.90 m/s    Ao root annulus 4.0 cm    STJ 2.6 cm    Ascending aorta 3.9 cm    ASI 1.9 cm/m2    IVC diameter 1.89 cm    Mean e' 0.04 m/s    ZLVIDS 1.99     ZLVIDD -0.70     LA area A4C 23.40 cm2    LA area A2C 29.94 cm2    EF 45 %    TV resting pulmonary artery pressure 34 mmHg    RV TB RVSP 6 mmHg    Est. RA pres 3 mmHg   Basic metabolic panel    Collection Time: 05/28/25  4:22 AM   Result Value Ref Range    Sodium 139 136 - 145 mmol/L    Potassium 2.9 (L) 3.5 - 5.1 mmol/L    Chloride 100 95 - 110 mmol/L    CO2 28 23 - 29 mmol/L    Glucose 91 70 - 110 mg/dL    BUN 22 (H) 6 - 20 mg/dL    Creatinine 1.4 0.5 - 1.4 mg/dL    Calcium 8.8 8.7 - 10.5 mg/dL    Anion Gap 11 8 - 16 mmol/L    eGFR >60 >60 mL/min/1.73/m2   Troponin I    Collection Time: 05/28/25  8:40 AM   Result Value Ref Range    Troponin-I 0.067 (H) <=0.026 ng/mL   Basic metabolic panel    Collection Time: 05/29/25  4:36 AM   Result Value Ref Range    Sodium 139 136 - 145 mmol/L    Potassium 3.3 (L) 3.5 - 5.1 mmol/L    Chloride 102 95 - 110 mmol/L    CO2 25 23 - 29 mmol/L    Glucose 91 70 - 110 mg/dL    BUN 26 (H) 6 - 20 mg/dL    Creatinine 1.2 0.5 - 1.4 mg/dL    Calcium 8.9 8.7 - 10.5 mg/dL    Anion Gap 12 8 - 16 mmol/L    eGFR >60 >60 mL/min/1.73/m2   Nuclear Stress - Cardiology Interpreted    Collection Time: 05/29/25 11:04 AM   Result Value Ref Range    85% Max Predicted      Max Predicted      OHS CV CPX PATIENT IS MALE 1.0     OHS CV CPX PATIENT IS FEMALE 0.0     HR at rest 69 bpm    Systolic blood pressure 165 mmHg    Diastolic blood pressure 112 mmHg    RPP 11,385     Peak HR 93 bpm    Peak Systolic  mmHg    Peak Diatolic  mmHg    Peak RPP 15,345     % Max HR Achieved 52           Significant Imaging:   Imaging Results              X-Ray Chest AP Portable (Final result)  Result time 05/27/25 07:45:51      Final result by Taran Stauffer MD  (05/27/25 07:45:51)                   Impression:      As above.      Electronically signed by: Taran Stauffer  Date:    05/27/2025  Time:    07:45               Narrative:    EXAMINATION:  XR CHEST AP PORTABLE    CLINICAL HISTORY:  sob;.    TECHNIQUE:  Single frontal portable view of the chest was performed.    COMPARISON:  None    FINDINGS:  Pulmonary vascular congestion with moderate interstitial/pulmonary edema.  Moderate cardiomegaly.  No pneumothorax or pleural effusion.                                         Assessment & Plan  CHF exacerbation  Patient has unspecified heart failure that is Acute. On presentation their CHF was decompensated. Evidence of decompensated CHF on presentation includes: crackles on lung auscultation, orthopnea, paroxysmal nocturnal dyspnea (PND), dyspnea on exertion (ESTRADA), and shortness of breath. The etiology of their decompensation is likely dietary indiscretion and increased fluid intake. Most recent BNP and echo results are listed below.  Recent Labs     05/27/25  0205   *     Latest ECHO  Results for orders placed during the hospital encounter of 12/21/21    Echo    Interpretation Summary  · Concentric hypertrophy and low normal systolic function.  · The estimated ejection fraction is 50%.  · Grade II left ventricular diastolic dysfunction.  · With normal right ventricular systolic function.  · Normal central venous pressure (3 mmHg).  · The estimated PA systolic pressure is 53 mmHg.  · There is pulmonary hypertension.  · The sinuses of Valsalva is mildly dilated.    Current Heart Failure Medications  losartan tablet 100 mg, Daily, Oral  carvediloL tablet 3.125 mg, 2 times daily, Oral    Plan  - Monitor strict I&Os and daily weights.    - Place on telemetry  - Low sodium diet  - Place on fluid restriction of 2 L.   - Cardiology has been consulted  - The patient's volume status is improving and at baseline   Hypertensive urgency  Patient has a current diagnosis of  hypertensive urgency (without evidence of end organ damage) which is controlled.  Latest blood pressure and vitals reviewed-   Temp:  [97.8 °F (36.6 °C)-98.8 °F (37.1 °C)]   Pulse:  [58-93]   Resp:  [16-18]   BP: (147-185)/()   SpO2:  [91 %-99 %] .   Patient currently off IV antihypertensives.   Home meds for hypertension were reviewed and noted below.   Hypertension Medications              amLODIPine (NORVASC) 10 MG tablet Take 1 tablet (10 mg total) by mouth once daily.    hydroCHLOROthiazide (HYDRODIURIL) 25 MG tablet Take 1 tablet (25 mg total) by mouth once daily.    losartan (COZAAR) 50 MG tablet Take 2 tablets (100 mg total) by mouth once daily.            Medication adjustment for hospital antihypertensives is as follows- resume HTN meds and utilize prn hydralazine     Will aim for controlled BP reduction by medications noted above. Monitor and mitigate end organ damage as indicated.  Hypokalemia  Patient's most recent potassium results are listed below.   Recent Labs     05/27/25  0643 05/28/25  0422 05/29/25  0436   K 3.3* 2.9* 3.3*     Plan  - Replete potassium per protocol  - Monitor potassium Daily  - Patient's hypokalemia is being treated    Elevated troponin  - trop trending down 0.143 > 0.067  - echo completed yesterday- Ejection fraction is approximately 45%. Grade II diastolic dysfunction.   - Stress test today       VTE Risk Mitigation (From admission, onward)           Ordered     enoxaparin injection 40 mg  Daily         05/27/25 0449     IP VTE HIGH RISK PATIENT  Once         05/27/25 0449     Place sequential compression device  Until discontinued         05/27/25 0449                    Discharge Planning   BENJAMIN: 5/31/2025     Code Status: Full Code   Medical Readiness for Discharge Date:   Discharge Plan A: Home                        HUONG Gottlieb  Department of Hospital Medicine   [facility] - Telemetry ([facility])

## 2025-05-29 NOTE — SUBJECTIVE & OBJECTIVE
Interval History: Free of chest pain. SOB.     Review of Systems   Constitutional:  Negative for activity change, appetite change, diaphoresis and fever.   HENT:  Negative for congestion.    Eyes:  Negative for discharge.   Respiratory:  Negative for chest tightness, shortness of breath and wheezing.    Cardiovascular:  Negative for chest pain, palpitations and leg swelling.   Gastrointestinal:  Negative for abdominal distention, abdominal pain, constipation and diarrhea.   Genitourinary:  Negative for flank pain and urgency.   Musculoskeletal:  Negative for back pain and myalgias.   Skin:  Negative for color change and pallor.   Allergic/Immunologic: Negative for environmental allergies.   Neurological:  Negative for dizziness, tremors, syncope and weakness.   Psychiatric/Behavioral:  Negative for agitation, behavioral problems and confusion.      Objective:     Vital Signs (Most Recent):  Temp: 97.8 °F (36.6 °C) (05/29/25 0831)  Pulse: 69 (05/29/25 1102)  Resp: 18 (05/29/25 0831)  BP: (!) 165/112 (05/29/25 1102)  SpO2: (!) 91 % (05/29/25 0831) Vital Signs (24h Range):  Temp:  [97.8 °F (36.6 °C)-98.8 °F (37.1 °C)] 97.8 °F (36.6 °C)  Pulse:  [58-93] 69  Resp:  [16-18] 18  SpO2:  [91 %-99 %] 91 %  BP: (135-179)/() 165/112     Weight: 94.3 kg (208 lb)  Body mass index is 34.61 kg/m².    Intake/Output Summary (Last 24 hours) at 5/29/2025 1114  Last data filed at 5/29/2025 1044  Gross per 24 hour   Intake 240 ml   Output 725 ml   Net -485 ml         Physical Exam  Constitutional:       Appearance: He is normal weight.   HENT:      Head: Atraumatic.      Nose: Nose normal.      Mouth/Throat:      Mouth: Mucous membranes are moist.      Pharynx: Oropharynx is clear.   Cardiovascular:      Rate and Rhythm: Normal rate and regular rhythm.      Pulses: Normal pulses.      Heart sounds: Normal heart sounds.   Pulmonary:      Effort: Pulmonary effort is normal.      Breath sounds: Normal breath sounds.   Abdominal:       General: Bowel sounds are normal.      Palpations: Abdomen is soft.   Musculoskeletal:         General: Normal range of motion.      Cervical back: Normal range of motion.   Skin:     General: Skin is warm and dry.      Capillary Refill: Capillary refill takes less than 2 seconds.   Neurological:      Mental Status: He is alert and oriented to person, place, and time.               Significant Labs:   Results for orders placed or performed during the hospital encounter of 05/27/25   EKG 12-lead    Collection Time: 05/27/25  1:53 AM   Result Value Ref Range    QRS Duration 98 ms    [facility] QTC Calculation 490 ms   Blood culture #1 **CANNOT BE ORDERED STAT**    Collection Time: 05/27/25  2:05 AM    Specimen: Peripheral, Forearm, Left; Blood   Result Value Ref Range    Blood Culture No Growth After 36 Hours    Hepatitis C Antibody    Collection Time: 05/27/25  2:05 AM   Result Value Ref Range    Hep C Ab Interp Negative Negative   HCV Virus Hold Specimen    Collection Time: 05/27/25  2:05 AM   Result Value Ref Range    Extra Tube Hold for add-ons.    HIV 1/2 Ag/Ab (4th Gen)    Collection Time: 05/27/25  2:05 AM   Result Value Ref Range    HIV 1/2 Ag/Ab Negative Negative   Comprehensive metabolic panel    Collection Time: 05/27/25  2:05 AM   Result Value Ref Range    Sodium 138 136 - 145 mmol/L    Potassium 2.9 (L) 3.5 - 5.1 mmol/L    Chloride 103 95 - 110 mmol/L    CO2 23 23 - 29 mmol/L    Glucose 96 70 - 110 mg/dL    BUN 13 6 - 20 mg/dL    Creatinine 1.0 0.5 - 1.4 mg/dL    Calcium 8.5 (L) 8.7 - 10.5 mg/dL    Protein Total 7.2 6.0 - 8.4 gm/dL    Albumin 3.8 3.5 - 5.2 g/dL    Bilirubin Total 0.6 0.1 - 1.0 mg/dL    ALP 87 40 - 150 unit/L    AST 26 11 - 45 unit/L    ALT 30 10 - 44 unit/L    Anion Gap 12 8 - 16 mmol/L    eGFR >60 >60 mL/min/1.73/m2   Troponin I    Collection Time: 05/27/25  2:05 AM   Result Value Ref Range    Troponin-I 0.110 (H) <=0.026 ng/mL   Brain natriuretic peptide    Collection Time: 05/27/25  2:05 AM    Result Value Ref Range     (H) 0 - 99 pg/mL   Lactic Acid, Plasma    Collection Time: 05/27/25  2:05 AM   Result Value Ref Range    Lactic Acid Level 1.1 0.5 - 2.2 mmol/L   CBC with Differential    Collection Time: 05/27/25  2:05 AM   Result Value Ref Range    WBC 10.43 3.90 - 12.70 K/uL    RBC 5.21 4.60 - 6.20 M/uL    HGB 15.7 14.0 - 18.0 gm/dL    HCT 45.8 40.0 - 54.0 %    MCV 88 82 - 98 fL    MCH 30.1 27.0 - 31.0 pg    MCHC 34.3 32.0 - 36.0 g/dL    RDW 13.6 11.5 - 14.5 %    Platelet Count 233 150 - 450 K/uL    MPV 9.8 9.2 - 12.9 fL    Nucleated RBC 0 <=0 /100 WBC    Neut % 58.2 38 - 73 %    Lymph % 29.5 18 - 48 %    Mono % 7.3 4 - 15 %    Eos % 3.5 <=8 %    Basophil % 0.7 <=1.9 %    Imm Grans % 0.8 (H) 0.0 - 0.5 %    Neut # 6.07 1.8 - 7.7 K/uL    Lymph # 3.08 1 - 4.8 K/uL    Mono # 0.76 0.3 - 1 K/uL    Eos # 0.37 <=0.5 K/uL    Baso # 0.07 <=0.2 K/uL    Imm Grans # 0.08 (H) 0.00 - 0.04 K/uL   TSH    Collection Time: 05/27/25  2:05 AM   Result Value Ref Range    TSH 4.238 (H) 0.400 - 4.000 uIU/mL    Free T4 0.99 0.71 - 1.51 ng/dL   Light Blue Top Hold    Collection Time: 05/27/25  2:05 AM   Result Value Ref Range    Extra Tube Hold for add-ons.    Light Green Top Hold    Collection Time: 05/27/25  2:05 AM   Result Value Ref Range    Extra Tube Hold for add-ons.    Magnesium    Collection Time: 05/27/25  2:05 AM   Result Value Ref Range    Magnesium  1.8 1.6 - 2.6 mg/dL   Blood culture #2 **CANNOT BE ORDERED STAT**    Collection Time: 05/27/25  2:08 AM    Specimen: Peripheral, Forearm, Right; Blood   Result Value Ref Range    Blood Culture No Growth After 36 Hours    Influenza A & B by Molecular    Collection Time: 05/27/25  2:12 AM    Specimen: Nasal Swab   Result Value Ref Range    INFLUENZA A MOLECULAR Negative Negative    INFLUENZA B MOLECULAR  Negative Negative   COVID-19 Rapid Screening    Collection Time: 05/27/25  2:12 AM   Result Value Ref Range    SARS COV-2 Molecular Negative Negative   Drug  screen panel, emergency    Collection Time: 05/27/25  2:45 AM   Result Value Ref Range    Benzodiazepine, Urine Negative Negative    Methadone, Urine Negative Negative    Cocaine, Urine Negative Negative    Opiates, Urine Negative Negative    Barbiturates, Urine Negative Negative    Amphetamines, Urine Negative Negative    THC Negative Negative    Phencyclidine, Urine Negative Negative    Urine Creatinine 31.4 23.0 - 375.0 mg/dL   Urinalysis, Reflex to Urine Culture Urine, Clean Catch    Collection Time: 05/27/25  2:45 AM    Specimen: Urine, Clean Catch   Result Value Ref Range    Color, UA Colorless (A) Straw, Roxy, Yellow, Light-Orange    Appearance, UA Clear Clear    pH, UA 7.0 5.0 - 8.0    Spec Grav UA 1.010 1.005 - 1.030    Protein, UA 1+ (A) Negative    Glucose, UA Negative Negative    Ketones, UA Negative Negative    Bilirubin, UA Negative Negative    Blood, UA Negative Negative    Nitrites, UA Negative Negative    Urobilinogen, UA Negative <2.0 EU/dL    Leukocyte Esterase, UA Negative Negative   GREY TOP URINE HOLD    Collection Time: 05/27/25  2:45 AM   Result Value Ref Range    Extra Tube Hold for add-ons.    Urinalysis Microscopic    Collection Time: 05/27/25  2:45 AM   Result Value Ref Range    RBC, UA 0 0 - 4 /HPF    WBC, UA 0 0 - 5 /HPF    WBC Clumps, UA None None, Rare    Bacteria, UA None None, Rare, Occasional /HPF    Yeast, UA None None /HPF    Squamous Epithelial Cells, UA 0 /HPF    Non-Squamous Epithelial Cells 0 <=0 /HPF    Hyaline Casts, UA 1 0 - 1 /LPF    Epithelial Casts 0 None /lpf    WBC Casts 0 None /LPF    RBC Casts 0 None /LPF    Granular Casts 0 None  /LPF    Fatty Casts, UA 0 None /LPF    Waxy Casts, UA 0 None /lpf    Triple Phosphate Crystals, UA None None, Rare, Occasional, Few, Moderate    Calcium Oxalate Crystals, UA None None, Rare, Occasional, Few, Moderate    Calcium Phosphate Crystal None Rare, None, Moderate, Few, Occasional    Calcium Carbonate Crystal None Occasional, Rare,  Few, None, Moderate    Ammonium Urate Crystals None Moderate, Occasional, Rare, Few, None    Uric Acid Crystals, UA None None, Rare, Occasional, Few, Moderate    Amorphous, UA None None, Occasional, Few, Moderate, Rare    Unclassified Crystals None None, Rare, Occasional, Few, Moderate    Trichomonas, UA None None /HPF    Other, UA None None    Microscopic Comment     Hemoglobin A1C    Collection Time: 05/27/25  6:43 AM   Result Value Ref Range    Hemoglobin A1c 5.3 4.0 - 5.6 %    Estimated Average Glucose 105 68 - 131 mg/dL   Basic metabolic panel    Collection Time: 05/27/25  6:43 AM   Result Value Ref Range    Sodium 138 136 - 145 mmol/L    Potassium 3.3 (L) 3.5 - 5.1 mmol/L    Chloride 102 95 - 110 mmol/L    CO2 25 23 - 29 mmol/L    Glucose 112 (H) 70 - 110 mg/dL    BUN 13 6 - 20 mg/dL    Creatinine 1.0 0.5 - 1.4 mg/dL    Calcium 8.7 8.7 - 10.5 mg/dL    Anion Gap 11 8 - 16 mmol/L    eGFR >60 >60 mL/min/1.73/m2   Troponin I    Collection Time: 05/27/25  6:43 AM   Result Value Ref Range    Troponin-I 0.143 (H) <=0.026 ng/mL   Echo    Collection Time: 05/27/25  8:56 AM   Result Value Ref Range    BSA 2.14 m2    LA WIDTH 4.2 cm    RA Width 4.5 cm    A4C EF 43 %    LVOT stroke volume 93.4 cm3    LVIDd 5.8 3.5 - 6.0 cm    LV Systolic Volume 113 mL    LV Systolic Volume Index 54.9 mL/m2    LVIDs 4.9 (A) 2.1 - 4.0 cm    LV ESV A2C 114.04 mL    LV Diastolic Volume 167 mL    LV ESV A4C 67.54 mL    LV Diastolic Volume Index 81.07 mL/m2    LV EDV A4C 134.14 mL    Left Ventricular End Systolic Volume by Teichholz Method 113.29 mL    Left Ventricular End Diastolic Volume by Teichholz Method 166.68 mL    IVS 1.3 (A) 0.6 - 1.1 cm    LVOT diameter 2.3 cm    LVOT area 4.2 cm2    FS 15.5 (A) 28 - 44 %    Left Ventricle Relative Wall Thickness 0.62 cm    PW 1.8 (A) 0.6 - 1.1 cm    LV mass 424.8 g    LV Mass Index 206.2 g/m2    MV Peak E Keon 1.00 m/s    TDI LATERAL 0.03 m/s    TDI SEPTAL 0.05 m/s    E/E' ratio 25 m/s    MV Peak A  Keon 1.14 m/s    TR Max Keon 2.8 m/s    E/A ratio 0.88     IVRT 86 msec    E wave deceleration time 142 msec    LV SEPTAL E/E' RATIO 20.0 m/s    JOSH 53 mL/m2    LV LATERAL E/E' RATIO 33.3 m/s    LA Vol 108 cm3    LVOT peak keon 1.3 m/s    Left Ventricular Outflow Tract Mean Velocity 0.93 cm/s    Left Ventricular Outflow Tract Mean Gradient 3.84 mmHg    RV- caraballo basal diam 3.9 cm    RVOT peak VTI 14.7 cm    TAPSE 2.9 cm    RV/LV Ratio 0.67 cm    LA size 4.3 cm    Left Atrium Minor Axis 6.9 cm    Left Atrium Major Axis 7.2 cm    LA Vol (MOD) 88 mL    JOSH (MOD) 43 mL/m2    RA Major Axis 6.52 cm    AV regurgitation pressure 1/2 time 454 ms    AR Max Keon 5.37 m/s    AV mean gradient 11 mmHg    AV peak gradient 16 mmHg    Ao peak keon 2.0 m/s    Ao VTI 35.1 cm    LVOT peak VTI 22.5 cm    AV valve area 2.7 cm²    AV Velocity Ratio 0.65     AV index (prosthetic) 0.64     GODWIN by Velocity Ratio 2.7 cm²    MV mean gradient 4 mmHg    MV peak gradient 8 mmHg    MV stenosis pressure 1/2 time 42.72 ms    MV valve area p 1/2 method 5.15 cm2    MV valve area by continuity eq 3.27 cm2    MV VTI 28.6 cm    Triscuspid Valve Regurgitation Peak Gradient 30 mmHg    PV mean gradient 2 mmHg    PV PEAK VELOCITY 1.49 m/s    PV peak gradient 9 mmHg    Pulmonary Valve Mean Velocity 0.92 m/s    RVOT peak keon 0.90 m/s    Ao root annulus 4.0 cm    STJ 2.6 cm    Ascending aorta 3.9 cm    ASI 1.9 cm/m2    IVC diameter 1.89 cm    Mean e' 0.04 m/s    ZLVIDS 1.99     ZLVIDD -0.70     LA area A4C 23.40 cm2    LA area A2C 29.94 cm2    EF 45 %    TV resting pulmonary artery pressure 34 mmHg    RV TB RVSP 6 mmHg    Est. RA pres 3 mmHg   Basic metabolic panel    Collection Time: 05/28/25  4:22 AM   Result Value Ref Range    Sodium 139 136 - 145 mmol/L    Potassium 2.9 (L) 3.5 - 5.1 mmol/L    Chloride 100 95 - 110 mmol/L    CO2 28 23 - 29 mmol/L    Glucose 91 70 - 110 mg/dL    BUN 22 (H) 6 - 20 mg/dL    Creatinine 1.4 0.5 - 1.4 mg/dL    Calcium 8.8 8.7 - 10.5  mg/dL    Anion Gap 11 8 - 16 mmol/L    eGFR >60 >60 mL/min/1.73/m2   Troponin I    Collection Time: 05/28/25  8:40 AM   Result Value Ref Range    Troponin-I 0.067 (H) <=0.026 ng/mL   Basic metabolic panel    Collection Time: 05/29/25  4:36 AM   Result Value Ref Range    Sodium 139 136 - 145 mmol/L    Potassium 3.3 (L) 3.5 - 5.1 mmol/L    Chloride 102 95 - 110 mmol/L    CO2 25 23 - 29 mmol/L    Glucose 91 70 - 110 mg/dL    BUN 26 (H) 6 - 20 mg/dL    Creatinine 1.2 0.5 - 1.4 mg/dL    Calcium 8.9 8.7 - 10.5 mg/dL    Anion Gap 12 8 - 16 mmol/L    eGFR >60 >60 mL/min/1.73/m2   Nuclear Stress - Cardiology Interpreted    Collection Time: 05/29/25 11:04 AM   Result Value Ref Range    85% Max Predicted      Max Predicted      OHS CV CPX PATIENT IS MALE 1.0     OHS CV CPX PATIENT IS FEMALE 0.0     HR at rest 69 bpm    Systolic blood pressure 165 mmHg    Diastolic blood pressure 112 mmHg    RPP 11,385     Peak HR 93 bpm    Peak Systolic  mmHg    Peak Diatolic  mmHg    Peak RPP 15,345     % Max HR Achieved 52           Significant Imaging:   Imaging Results              X-Ray Chest AP Portable (Final result)  Result time 05/27/25 07:45:51      Final result by Taran Stauffer MD (05/27/25 07:45:51)                   Impression:      As above.      Electronically signed by: Taran Stauffer  Date:    05/27/2025  Time:    07:45               Narrative:    EXAMINATION:  XR CHEST AP PORTABLE    CLINICAL HISTORY:  sob;.    TECHNIQUE:  Single frontal portable view of the chest was performed.    COMPARISON:  None    FINDINGS:  Pulmonary vascular congestion with moderate interstitial/pulmonary edema.  Moderate cardiomegaly.  No pneumothorax or pleural effusion.

## 2025-05-29 NOTE — INTERVAL H&P NOTE
The patient has been examined and the H&P has been reviewed:    I concur with the findings and no changes have occurred since H&P was written.  HAS ABNORMAL CARDIOLITE WITH DECREASE EF FOR R/LHC  Procedure risks, benefits and alternative options discussed and understood by patient/family.          Active Hospital Problems    Diagnosis  POA    *CHF exacerbation [I50.9]  Unknown    Hypokalemia [E87.6]  Unknown    Hypertensive urgency [I16.0]  Unknown    Elevated troponin [R79.89]  Yes      Resolved Hospital Problems   No resolved problems to display.

## 2025-05-29 NOTE — PROGRESS NOTES
[facility] - Telemetry ([facility])  Cardiology  Progress Note    Patient Name: Henry Harmon  MRN: [id number]  Admission Date: 5/27/2025  Hospital Length of Stay: 2 days  Code Status: Full Code   Attending Physician: Spenser Lyons MD   Primary Care Physician: Catie, Primary Doctor  Expected Discharge Date: 5/31/2025  Principal Problem:CHF exacerbation    Subjective:   HPI:  Mr. Harmon is a 40 year old male patient whose current medical conditions include HTN who presented to [facility] ED overnight due to increased SOB over the past 1-2 days. Associated symptoms included left-sided chest pressure, orthopnea, non-productive cough, and PND. He denied any associated nausea, BLE edema, vomiting, palpitations, near syncope, or syncope. Reported he had been off of BP medications for past 6 months-1 year. Initial workup in ED revealed uncontrolled BP (233/149), BNP of 236, and troponin of 0.110. CXR with pulmonary edema. Patient subsequently given IV Lasix and IV hydralazine and admitted for further evaluation and treatment. Cardiology consulted to assist with management. Patient seen and examined today, resting in bed. Feeling better, less SOB. Excellent diuresis. CP free. No piror history of CHF. Denies familial history of CAD. Chart reviewed. Troponin 0.110>0.143. TTE pending. EKG with LVH.       Hospital Course:   5/28/2025-Patient seen and examined today, resting in bed. Feels much better. SOB resolved, diuresed over 4L. Did report some mild LH/dizziness with ambulation. No CP. BP improved. TTE with EF of 45%, DD. MPI stress test ordered.    5/29/2025-Pt seen and examined today. Labs reviewed. Stress test performed today and awaiting final read. Discussed if normal he should be good for DC. Discussed importance of med compliance and diet restrictions. Denies any symptoms at this time.         Review of Systems   Cardiovascular:  Negative for chest pain, dyspnea on exertion, leg swelling, near-syncope, orthopnea,  palpitations and syncope.   Respiratory:  Negative for cough, shortness of breath and wheezing.    Neurological:  Negative for dizziness and light-headedness.     Objective:     Vital Signs (Most Recent):  Temp: 98.2 °F (36.8 °C) (05/29/25 1213)  Pulse: 76 (05/29/25 1251)  Resp: 18 (05/29/25 1212)  BP: (!) 160/97 (05/29/25 1251)  SpO2: (!) 94 % (05/29/25 1212) Vital Signs (24h Range):  Temp:  [97.8 °F (36.6 °C)-98.8 °F (37.1 °C)] 98.2 °F (36.8 °C)  Pulse:  [58-93] 76  Resp:  [16-18] 18  SpO2:  [91 %-99 %] 94 %  BP: (147-185)/() 160/97     Weight: 94.3 kg (208 lb)  Body mass index is 34.61 kg/m².     SpO2: (!) 94 %         Intake/Output Summary (Last 24 hours) at 5/29/2025 1338  Last data filed at 5/29/2025 1255  Gross per 24 hour   Intake --   Output 825 ml   Net -825 ml       Lines/Drains/Airways       Peripheral Intravenous Line  Duration                  Peripheral IV - Single Lumen 05/27/25 0205 18 G Anterior;Left Forearm 2 days                       Physical Exam  Vitals reviewed.   Constitutional:       Appearance: Normal appearance. He is obese.   HENT:      Head: Normocephalic and atraumatic.      Nose: Nose normal.   Eyes:      Extraocular Movements: Extraocular movements intact.   Cardiovascular:      Rate and Rhythm: Normal rate.   Pulmonary:      Effort: Pulmonary effort is normal.   Abdominal:      Palpations: Abdomen is soft.   Musculoskeletal:         General: Normal range of motion.      Cervical back: Normal range of motion and neck supple.      Right lower leg: No edema.      Left lower leg: No edema.   Skin:     General: Skin is warm and dry.   Neurological:      Mental Status: He is alert and oriented to person, place, and time. Mental status is at baseline.   Psychiatric:         Mood and Affect: Mood normal.         Behavior: Behavior normal.            Significant Labs: BMP:   Recent Labs   Lab 05/28/25  0422 05/29/25  0436   GLU 91 91    139   K 2.9* 3.3*    102   CO2 28 25  "  BUN 22* 26*   CREATININE 1.4 1.2   CALCIUM 8.8 8.9   , CMP   Recent Labs   Lab 05/28/25  0422 05/29/25  0436    139   K 2.9* 3.3*    102   CO2 28 25   GLU 91 91   BUN 22* 26*   CREATININE 1.4 1.2   CALCIUM 8.8 8.9   ANIONGAP 11 12   , CBC No results for input(s): "WBC", "HGB", "HCT", "PLT" in the last 48 hours., Lipid Panel No results for input(s): "CHOL", "HDL", "LDLCALC", "TRIG", "CHOLHDL" in the last 48 hours., and Troponin No results for input(s): "TROPONINIHS" in the last 48 hours.    Significant Imaging: Echocardiogram: Transthoracic echo (TTE) complete (Cupid Only):   Results for orders placed or performed during the hospital encounter of 05/27/25   Echo   Result Value Ref Range    BSA 2.14 m2    LA WIDTH 4.2 cm    RA Width 4.5 cm    A4C EF 43 %    LVOT stroke volume 93.4 cm3    LVIDd 5.8 3.5 - 6.0 cm    LV Systolic Volume 113 mL    LV Systolic Volume Index 54.9 mL/m2    LVIDs 4.9 (A) 2.1 - 4.0 cm    LV ESV A2C 114.04 mL    LV Diastolic Volume 167 mL    LV ESV A4C 67.54 mL    LV Diastolic Volume Index 81.07 mL/m2    LV EDV A4C 134.14 mL    Left Ventricular End Systolic Volume by Teichholz Method 113.29 mL    Left Ventricular End Diastolic Volume by Teichholz Method 166.68 mL    IVS 1.3 (A) 0.6 - 1.1 cm    LVOT diameter 2.3 cm    LVOT area 4.2 cm2    FS 15.5 (A) 28 - 44 %    Left Ventricle Relative Wall Thickness 0.62 cm    PW 1.8 (A) 0.6 - 1.1 cm    LV mass 424.8 g    LV Mass Index 206.2 g/m2    MV Peak E Keon 1.00 m/s    TDI LATERAL 0.03 m/s    TDI SEPTAL 0.05 m/s    E/E' ratio 25 m/s    MV Peak A Keon 1.14 m/s    TR Max Keon 2.8 m/s    E/A ratio 0.88     IVRT 86 msec    E wave deceleration time 142 msec    LV SEPTAL E/E' RATIO 20.0 m/s    JOSH 53 mL/m2    LV LATERAL E/E' RATIO 33.3 m/s    LA Vol 108 cm3    LVOT peak keon 1.3 m/s    Left Ventricular Outflow Tract Mean Velocity 0.93 cm/s    Left Ventricular Outflow Tract Mean Gradient 3.84 mmHg    RV- caraballo basal diam 3.9 cm    RVOT peak VTI 14.7 cm "    TAPSE 2.9 cm    RV/LV Ratio 0.67 cm    LA size 4.3 cm    Left Atrium Minor Axis 6.9 cm    Left Atrium Major Axis 7.2 cm    LA Vol (MOD) 88 mL    JOSH (MOD) 43 mL/m2    RA Major Axis 6.52 cm    AV regurgitation pressure 1/2 time 454 ms    AR Max Yandel 5.37 m/s    AV mean gradient 11 mmHg    AV peak gradient 16 mmHg    Ao peak yandel 2.0 m/s    Ao VTI 35.1 cm    LVOT peak VTI 22.5 cm    AV valve area 2.7 cm²    AV Velocity Ratio 0.65     AV index (prosthetic) 0.64     GODWIN by Velocity Ratio 2.7 cm²    MV mean gradient 4 mmHg    MV peak gradient 8 mmHg    MV stenosis pressure 1/2 time 42.72 ms    MV valve area p 1/2 method 5.15 cm2    MV valve area by continuity eq 3.27 cm2    MV VTI 28.6 cm    Triscuspid Valve Regurgitation Peak Gradient 30 mmHg    PV mean gradient 2 mmHg    PV PEAK VELOCITY 1.49 m/s    PV peak gradient 9 mmHg    Pulmonary Valve Mean Velocity 0.92 m/s    RVOT peak yandel 0.90 m/s    Ao root annulus 4.0 cm    STJ 2.6 cm    Ascending aorta 3.9 cm    ASI 1.9 cm/m2    IVC diameter 1.89 cm    Mean e' 0.04 m/s    ZLVIDS 1.99     ZLVIDD -0.70     LA area A4C 23.40 cm2    LA area A2C 29.94 cm2    EF 45 %    TV resting pulmonary artery pressure 34 mmHg    RV TB RVSP 6 mmHg    Est. RA pres 3 mmHg    Narrative      Left Ventricle: The left ventricle is normal in size. Mildly increased   wall thickness. There is concentric hypertrophy. There is mildly reduced   systolic function with a visually estimated ejection fraction of 40 - 50%.   Ejection fraction is approximately 45%. Grade II diastolic dysfunction.    Right Ventricle: The right ventricle is normal in size Wall thickness   is normal. Systolic function is normal.    Left Atrium: The left atrium is severely dilated    Aortic Valve: There is mild aortic regurgitation.    Pulmonary Artery: The estimated pulmonary artery systolic pressure is   34 mmHg.    IVC/SVC: Normal venous pressure at 3 mmHg.     , EKG: reviewed, Stress Test: reviewed, and X-Ray: CXR: X-Ray  Chest 1 View (CXR): No results found for this visit on 05/27/25.  Assessment and Plan:       * CHF exacerbation  -Presents with decompensated CHF in setting of uncontrolled HTN  -Continue IV diuresis  -ARB added  -Will plan to add Coreg tmw  -TTE pending  -Strict I's/O's  -Counseled on low salt diet    5/28/2025  -Much improved  -Will consider po Lasix tmw  -TTE with EF of 45%  -Continue Losartan, Coreg added today  -MPI stress test pending    5/29/2025  Symptoms improved   Continue losartan and coreg  Can add PO lasix 20 mg daily upon DC   Stress test today , follow up  results     Hypertensive urgency  -Due to medication non-compliance  -ARB initiated  -Diurese   -Will plan to add BB tomorrow  -TTE pending    5/28/2025  -BP improved  -Continue ARB  -Coreg 3.125 mg BID added  -TTE with EF of 45%  -MPI stress test pending    5/29/2025  Continue with current meds     Hypokalemia  -Being repleted    Elevated troponin  -Troponin 0.110>0.143, continue to trend  -Likely due to demand ischemia from uncontrolled HTN/CHF  -ASA  -ARB  -Statin if indicated  -BB tmw  -TTE pending; stress test IP vs OP    5/28/2025  -Flat  -TTE with EF of 45%  -Continue ASA, ARB, BB  -MPI stress test pending    5/29/2025  See above   Stress test today   Continue with ASA, ARB, BB           VTE Risk Mitigation (From admission, onward)           Ordered     enoxaparin injection 40 mg  Daily         05/27/25 0449     IP VTE HIGH RISK PATIENT  Once         05/27/25 0449     Place sequential compression device  Until discontinued         05/27/25 0449                    Haris Jade PA-C  Cardiology  [facility] - Telemetry ([facility])

## 2025-05-29 NOTE — ASSESSMENT & PLAN NOTE
-Troponin 0.110>0.143, continue to trend  -Likely due to demand ischemia from uncontrolled HTN/CHF  -ASA  -ARB  -Statin if indicated  -BB tmw  -TTE pending; stress test IP vs OP    5/28/2025  -Flat  -TTE with EF of 45%  -Continue ASA, ARB, BB  -MPI stress test pending    5/29/2025  See above   Stress test today   Continue with ASA, ARB, BB

## 2025-05-30 ENCOUNTER — TELEPHONE (OUTPATIENT)
Dept: CARDIOLOGY | Facility: HOSPITAL | Age: 40
End: 2025-05-30
Payer: MEDICAID

## 2025-05-30 VITALS
HEIGHT: 65 IN | RESPIRATION RATE: 18 BRPM | HEART RATE: 71 BPM | WEIGHT: 208 LBS | OXYGEN SATURATION: 96 % | BODY MASS INDEX: 34.66 KG/M2 | DIASTOLIC BLOOD PRESSURE: 113 MMHG | SYSTOLIC BLOOD PRESSURE: 174 MMHG | TEMPERATURE: 98 F

## 2025-05-30 PROBLEM — R06.02 SHORTNESS OF BREATH: Status: ACTIVE | Noted: 2025-05-30

## 2025-05-30 LAB
ABSOLUTE EOSINOPHIL (OHS): 0.26 K/UL
ABSOLUTE MONOCYTE (OHS): 0.75 K/UL (ref 0.3–1)
ABSOLUTE NEUTROPHIL COUNT (OHS): 3.47 K/UL (ref 1.8–7.7)
ANION GAP (OHS): 9 MMOL/L (ref 8–16)
BASOPHILS # BLD AUTO: 0.06 K/UL
BASOPHILS NFR BLD AUTO: 0.9 %
BUN SERPL-MCNC: 22 MG/DL (ref 6–20)
CALCIUM SERPL-MCNC: 8.5 MG/DL (ref 8.7–10.5)
CHLORIDE SERPL-SCNC: 107 MMOL/L (ref 95–110)
CO2 SERPL-SCNC: 24 MMOL/L (ref 23–29)
CREAT SERPL-MCNC: 1.1 MG/DL (ref 0.5–1.4)
ERYTHROCYTE [DISTWIDTH] IN BLOOD BY AUTOMATED COUNT: 13.6 % (ref 11.5–14.5)
GFR SERPLBLD CREATININE-BSD FMLA CKD-EPI: >60 ML/MIN/1.73/M2
GLUCOSE SERPL-MCNC: 93 MG/DL (ref 70–110)
HCT VFR BLD AUTO: 45.7 % (ref 40–54)
HGB BLD-MCNC: 15.5 GM/DL (ref 14–18)
IMM GRANULOCYTES # BLD AUTO: 0.03 K/UL (ref 0–0.04)
IMM GRANULOCYTES NFR BLD AUTO: 0.4 % (ref 0–0.5)
LYMPHOCYTES # BLD AUTO: 2.25 K/UL (ref 1–4.8)
MCH RBC QN AUTO: 30.5 PG (ref 27–31)
MCHC RBC AUTO-ENTMCNC: 33.9 G/DL (ref 32–36)
MCV RBC AUTO: 90 FL (ref 82–98)
NUCLEATED RBC (/100WBC) (OHS): 0 /100 WBC
PLATELET # BLD AUTO: 209 K/UL (ref 150–450)
PMV BLD AUTO: 10.2 FL (ref 9.2–12.9)
POCT GLUCOSE: 85 MG/DL (ref 70–110)
POCT GLUCOSE: 92 MG/DL (ref 70–110)
POTASSIUM SERPL-SCNC: 3.8 MMOL/L (ref 3.5–5.1)
RBC # BLD AUTO: 5.09 M/UL (ref 4.6–6.2)
RELATIVE EOSINOPHIL (OHS): 3.8 %
RELATIVE LYMPHOCYTE (OHS): 33 % (ref 18–48)
RELATIVE MONOCYTE (OHS): 11 % (ref 4–15)
RELATIVE NEUTROPHIL (OHS): 50.9 % (ref 38–73)
SODIUM SERPL-SCNC: 140 MMOL/L (ref 136–145)
WBC # BLD AUTO: 6.82 K/UL (ref 3.9–12.7)

## 2025-05-30 PROCEDURE — 25000003 PHARM REV CODE 250: Performed by: PHYSICIAN ASSISTANT

## 2025-05-30 PROCEDURE — 85025 COMPLETE CBC W/AUTO DIFF WBC: CPT | Performed by: INTERNAL MEDICINE

## 2025-05-30 PROCEDURE — 99232 SBSQ HOSP IP/OBS MODERATE 35: CPT | Mod: ,,, | Performed by: INTERNAL MEDICINE

## 2025-05-30 PROCEDURE — 25000003 PHARM REV CODE 250: Performed by: INTERNAL MEDICINE

## 2025-05-30 PROCEDURE — 80048 BASIC METABOLIC PNL TOTAL CA: CPT | Performed by: INTERNAL MEDICINE

## 2025-05-30 PROCEDURE — 36415 COLL VENOUS BLD VENIPUNCTURE: CPT | Performed by: INTERNAL MEDICINE

## 2025-05-30 PROCEDURE — 25000003 PHARM REV CODE 250

## 2025-05-30 RX ORDER — FUROSEMIDE 40 MG/1
20 TABLET ORAL DAILY
Qty: 15 TABLET | Refills: 0 | Status: SHIPPED | OUTPATIENT
Start: 2025-05-30 | End: 2025-06-05 | Stop reason: SDUPTHER

## 2025-05-30 RX ORDER — POTASSIUM CHLORIDE 20 MEQ/1
40 TABLET, EXTENDED RELEASE ORAL DAILY
Qty: 60 TABLET | Refills: 0 | Status: SHIPPED | OUTPATIENT
Start: 2025-05-31 | End: 2025-06-05 | Stop reason: SDUPTHER

## 2025-05-30 RX ORDER — CARVEDILOL 6.25 MG/1
6.25 TABLET ORAL 2 TIMES DAILY
Qty: 60 TABLET | Refills: 0 | Status: SHIPPED | OUTPATIENT
Start: 2025-05-30 | End: 2025-06-05 | Stop reason: SDUPTHER

## 2025-05-30 RX ORDER — ATORVASTATIN CALCIUM 40 MG/1
40 TABLET, FILM COATED ORAL DAILY
Qty: 30 TABLET | Refills: 0 | Status: SHIPPED | OUTPATIENT
Start: 2025-05-30 | End: 2025-06-05 | Stop reason: SDUPTHER

## 2025-05-30 RX ORDER — NITROGLYCERIN 0.4 MG/1
0.4 TABLET SUBLINGUAL EVERY 5 MIN PRN
Qty: 25 TABLET | Refills: 1 | Status: SHIPPED | OUTPATIENT
Start: 2025-05-30 | End: 2025-06-29

## 2025-05-30 RX ADMIN — SODIUM CHLORIDE 1000 ML: 0.9 INJECTION, SOLUTION INTRAVENOUS at 12:05

## 2025-05-30 RX ADMIN — LOSARTAN POTASSIUM 100 MG: 50 TABLET, FILM COATED ORAL at 08:05

## 2025-05-30 RX ADMIN — POTASSIUM CHLORIDE 40 MEQ: 1500 TABLET, EXTENDED RELEASE ORAL at 08:05

## 2025-05-30 RX ADMIN — HYDROCHLOROTHIAZIDE 25 MG: 25 TABLET ORAL at 08:05

## 2025-05-30 RX ADMIN — CARVEDILOL 6.25 MG: 6.25 TABLET, FILM COATED ORAL at 08:05

## 2025-05-30 NOTE — ASSESSMENT & PLAN NOTE
-Troponin 0.110>0.143, continue to trend  -Likely due to demand ischemia from uncontrolled HTN/CHF  -ASA  -ARB  -Statin if indicated  -BB tmw  -TTE pending; stress test IP vs OP    5/28/2025  -Flat  -TTE with EF of 45%  -Continue ASA, ARB, BB  -MPI stress test pending    5/29/2025  See above   Stress test today   Continue with ASA, ARB, BB     5/30/2025  Normal coronary arteries   Continue with antihypertensive regimen

## 2025-05-30 NOTE — PLAN OF CARE
[facility] - Telemetry (Hospital)  Discharge Final Note    Primary Care Provider: Catie Primary Doctor    Expected Discharge Date: 5/30/2025    Final Discharge Note (most recent)       Final Note - 05/30/25 1537          Final Note    Assessment Type Final Discharge Note     Anticipated Discharge Disposition Home or Self Care     Hospital Resources/Appts/Education Provided Post-Acute resouces added to AVS;Appointments scheduled and added to AVS        Post-Acute Status    Discharge Delays None known at this time                   Pt to discharge home today.   Cardiology follow up scheduled on AVS:  Established Patient Visit with Vidhya Hermosillo PA-C  Thursday Jun 5, 2025 10:00 AM  Pt to follow up with PCP @ [facility]     Important Message from Medicare             Contact Info       Casey Leija MD   Specialty: Interventional Cardiology, Cardiology    [address]  [place] [address]   Phone: [phone]       Next Steps: Follow up in 1 week(s)    No, Primary Doctor   Relationship: PCP - General        Next Steps: Schedule an appointment as soon as possible for a visit    Instructions: [facility]CP    [facility]    [address] [place] [id number]   Phone: [phone]       Next Steps: Follow up    Instructions: Hospital Follow Up

## 2025-05-30 NOTE — PLAN OF CARE
L TR Band removed per protocol; pressure dressing placed.  Left brachial and left radial dressings remain c/d/I and sites wnl at time of transfer.  Transferred back to room 254, via hospital bed, in no apparent distress.   Report given to DRALIN Ace; no further questions at this time.

## 2025-05-30 NOTE — ASSESSMENT & PLAN NOTE
"Patient has unspecified heart failure that is Acute. On presentation their CHF was decompensated. Evidence of decompensated CHF on presentation includes: crackles on lung auscultation, orthopnea, paroxysmal nocturnal dyspnea (PND), dyspnea on exertion (ESTRADA), and shortness of breath. The etiology of their decompensation is likely dietary indiscretion and increased fluid intake. Most recent BNP and echo results are listed below.  No results for input(s): "BNP" in the last 72 hours.    Latest ECHO  Results for orders placed during the hospital encounter of 12/21/21    Echo    Interpretation Summary  · Concentric hypertrophy and low normal systolic function.  · The estimated ejection fraction is 50%.  · Grade II left ventricular diastolic dysfunction.  · With normal right ventricular systolic function.  · Normal central venous pressure (3 mmHg).  · The estimated PA systolic pressure is 53 mmHg.  · There is pulmonary hypertension.  · The sinuses of Valsalva is mildly dilated.    Current Heart Failure Medications  losartan tablet 100 mg, Daily, Oral  hydroCHLOROthiazide tablet 25 mg, Daily, Oral  carvediloL tablet 6.25 mg, 2 times daily, Oral  carvedilol (COREG) tablet, 2 times daily, Oral  furosemide (LASIX) tablet, Daily, Oral    Plan  - Monitor strict I&Os and daily weights.    - Low sodium diet  - Place on fluid restriction of 2 L.   - Cardiology has been consulted  - The patient's volume status is improving and at baseline   "

## 2025-05-30 NOTE — DISCHARGE SUMMARY
[facility] - Telemetry ([facility] Medicine  Discharge Summary      Patient Name: Henry Harmon  MRN: [id number]  [facility]: [id number]  Patient Class: IP- Inpatient  Admission Date: 5/27/2025  Hospital Length of Stay: 3 days  Discharge Date and Time: 05/30/2025 2:29 PM  Attending Physician: Spenser Lyons MD   Discharging Provider: HUONG Gottlieb  Primary Care Provider: Catie Primary Doctor    Primary Care Team: Networked reference to record PCT     HPI:   Henry Harmon is a 40 y.o. male with a PMH  has a past medical history of Hypertension.presented to the Emergency Department for evaluation of SOB which began 1 day ago. Pt reports L-sided chest pain and his sxs have worsened today that prompted his ER visit. Pt reports that the doctor he sees took him off of his BP medications years ago. Symptoms are constant and moderate in severity.  Patient reports his shortness of breath worsens with exertion and with lying flat.  Patient states he has been having asleep with 2 pillows in mostly in the upright position. Shortness of breath improves when not exerting himself.  Associated sxs include nonproductive cough.  Denies history of heart failure.  Patient denies any nausea. No prior Tx specified.  No further complaints or concerns at this time.      ER workup revealed CBC to be unremarkable.  CMP revealed potassium of 2.9.  .  Troponin 0.110.  Lactic acid normal.  TSH 4.238.  Free T4 0.99.  Urine and drug UA negative.  Flu and COVID negative.  Chest x-ray revealed cardiomegaly with evidence of bilateral pulmonary edema per wet read.  EKG revealed normal sinus rhythm with biatrial enlargement and right word axis with ventricular rate of 97 beats per minute and a QT/QTC of 386/490.  BP initially elevated to 233/149 in ER, but improved to of 132/70 in ER after receiving 60 mg Lasix IV, 10 mg hydralazine IV, 20 mg hydralazine IV.  Patient also received 50 mEq potassium bicarbonate disintegrating  tablet in addition to 325 mg aspirin 650 mg Tylenol in ER.  Hospital Medicine consulted to admit patient for further workup for CHF.  Patient in agreement with treatment plan.  Patient admitted under inpatient status.    PCP: No, Primary Doctor      Procedure(s) (LRB):  CATHETERIZATION, HEART, BOTH LEFT AND RIGHT (N/A)      Hospital Course:   5/28- Echo showed EF approximately 45%. Grade II diastolic dysfunction. Started on ARB and will start BB today. Patient remains free of CP/ SOB.  Diuresed well over night (2L of output).  BP remains normotensive today, other vital signs stable. Stress test scheduled for today. Hypokalemia noted- continuing with PO K regimen.     5/29- Stress test rescheduled from yesterday to today. Patient remains free of CP/ SOB. Hypokalemia improving. Cardiology added Coreg and Losartan added to HTN medications, recommends adding PO lasix 20mg daily upon DC.     5/30- Stress test/  LHC yesterday- with normal coronary arteries and EF approx 40%.  Cardiology cleared for f/u outpatient.  Home medications to include: Lasix 20mg, Coreg, Losartan, and Atorvastatin. VSS. Labs unremarkable today.  Free of chest pain since admission. Discussed lifestyle modifications.   This patient has been seen and examined on the date of discharge and determined suitable for discharge.         Goals of Care Treatment Preferences:  Code Status: Full Code         Consults:   Consults (From admission, onward)          Status Ordering Provider     Inpatient consult to Cardiology  Once        Provider:  Casey Leija MD    Completed JENN CHANCE     Inpatient consult to Social Work/Case Management  Once        Provider:  (Not yet assigned)    Completed JENN CHANCE     Inpatient consult to Registered Dietitian/Nutritionist  Once        Provider:  (Not yet assigned)    Completed JENN CHANCE            Assessment & Plan  CHF exacerbation  Patient has unspecified heart failure that is Acute. On  "presentation their CHF was decompensated. Evidence of decompensated CHF on presentation includes: crackles on lung auscultation, orthopnea, paroxysmal nocturnal dyspnea (PND), dyspnea on exertion (ESTRADA), and shortness of breath. The etiology of their decompensation is likely dietary indiscretion and increased fluid intake. Most recent BNP and echo results are listed below.  No results for input(s): "BNP" in the last 72 hours.    Latest ECHO  Results for orders placed during the hospital encounter of 12/21/21    Echo    Interpretation Summary  · Concentric hypertrophy and low normal systolic function.  · The estimated ejection fraction is 50%.  · Grade II left ventricular diastolic dysfunction.  · With normal right ventricular systolic function.  · Normal central venous pressure (3 mmHg).  · The estimated PA systolic pressure is 53 mmHg.  · There is pulmonary hypertension.  · The sinuses of Valsalva is mildly dilated.    Current Heart Failure Medications  losartan tablet 100 mg, Daily, Oral  hydroCHLOROthiazide tablet 25 mg, Daily, Oral  carvediloL tablet 6.25 mg, 2 times daily, Oral  carvedilol (COREG) tablet, 2 times daily, Oral  furosemide (LASIX) tablet, Daily, Oral    Plan  - Monitor strict I&Os and daily weights.    - Low sodium diet  - Place on fluid restriction of 2 L.   - Cardiology has been consulted  - The patient's volume status is improving and at baseline   Hypertensive urgency  Patient has a current diagnosis of hypertensive urgency (without evidence of end organ damage) which is controlled.  Latest blood pressure and vitals reviewed-   Temp:  [97.3 °F (36.3 °C)-98.5 °F (36.9 °C)]   Pulse:  [62-74]   Resp:  [17-21]   BP: (134-180)/()   SpO2:  [94 %-97 %] .   Patient currently off IV antihypertensives.   Home meds for hypertension were reviewed and noted below.   Hypertension Medications              amLODIPine (NORVASC) 10 MG tablet Take 1 tablet (10 mg total) by mouth once daily.    " hydroCHLOROthiazide (HYDRODIURIL) 25 MG tablet Take 1 tablet (25 mg total) by mouth once daily.    losartan (COZAAR) 50 MG tablet Take 2 tablets (100 mg total) by mouth once daily.            Medication adjustment for hospital antihypertensives is as follows- resume HTN meds and utilize prn hydralazine     Will aim for controlled BP reduction by medications noted above. Monitor and mitigate end organ damage as indicated.  Hypokalemia  Patient's most recent potassium results are listed below.   Recent Labs     05/28/25  0422 05/29/25  0436 05/30/25  0406   K 2.9* 3.3* 3.8     Plan  - continue on potassium     Elevated troponin  - stress test/ heart craterization yesterday- will f/u with cardiology outpatient      Final Active Diagnoses:    Diagnosis Date Noted POA    PRINCIPAL PROBLEM:  CHF exacerbation [I50.9] 05/27/2025 Unknown    Hypokalemia [E87.6] 05/27/2025 Unknown    Hypertensive urgency [I16.0] 05/27/2025 Unknown    Elevated troponin [R79.89] 12/21/2021 Yes      Problems Resolved During this Admission:       Discharged Condition: good    Disposition: Home or Self Care    Follow Up:   Follow-up Information       Casey Leija MD Follow up in 1 week(s).    Specialties: Interventional Cardiology, Cardiology  Contact information:  [address]  [place] [address] [phone]               No, Primary Doctor. Schedule an appointment as soon as possible for a visit.    Why: [facility] PCP                         Patient Instructions:      Diet Cardiac     Notify your health care provider if you experience any of the following:  persistent nausea and vomiting or diarrhea     Notify your health care provider if you experience any of the following:  temperature >100.4     Notify your health care provider if you experience any of the following:  severe uncontrolled pain     Notify your health care provider if you experience any of the following:  persistent dizziness, light-headedness, or visual  disturbances     Notify your health care provider if you experience any of the following:  severe persistent headache       Significant Diagnostic Studies: Labs: BMP:   Recent Labs   Lab 05/29/25  0436 05/30/25  0406   GLU 91 93    140   K 3.3* 3.8    107   CO2 25 24   BUN 26* 22*   CREATININE 1.2 1.1   CALCIUM 8.9 8.5*   , CMP   Recent Labs   Lab 05/29/25  0436 05/30/25  0406    140   K 3.3* 3.8    107   CO2 25 24   GLU 91 93   BUN 26* 22*   CREATININE 1.2 1.1   CALCIUM 8.9 8.5*   ANIONGAP 12 9   , CBC   Recent Labs   Lab 05/30/25  0406   WBC 6.82   HGB 15.5   HCT 45.7      , and Troponin   Recent Labs   Lab 05/27/25  0205 05/27/25  0643 05/28/25  0840   TROPONINI 0.110* 0.143* 0.067*       Pending Diagnostic Studies:       Procedure Component Value Units Date/Time    Lipid Panel [[id number]]     Order Status: Sent Lab Status: No result     Specimen: Blood            Medications:  Reconciled Home Medications:      Medication List        START taking these medications      atorvastatin 40 MG tablet  Commonly known as: LIPITOR  Take 1 tablet (40 mg total) by mouth once daily.     carvediloL 6.25 MG tablet  Commonly known as: COREG  Take 1 tablet (6.25 mg total) by mouth 2 (two) times daily.     furosemide 40 MG tablet  Commonly known as: LASIX  Take ½ tablet (20 mg total) by mouth once daily.     nitroGLYCERIN 0.4 MG SL tablet  Commonly known as: NITROSTAT  Place 1 tablet (0.4 mg total) under the tongue every 5 (five) minutes as needed for Chest pain.     potassium chloride SA 20 MEQ tablet  Commonly known as: K-DUR,KLOR-CON  Take 2 tablets (40 mEq total) by mouth once daily.  Start taking on: May 31, 2025            CONTINUE taking these medications      amLODIPine 10 MG tablet  Commonly known as: NORVASC  Take 1 tablet (10 mg total) by mouth once daily.     aspirin 81 MG EC tablet  Commonly known as: ECOTRIN  Take 1 tablet (81 mg total) by mouth once daily.     hydroCHLOROthiazide 25  MG tablet  Commonly known as: HYDRODIURIL  Take 1 tablet (25 mg total) by mouth once daily.     losartan 50 MG tablet  Commonly known as: COZAAR  Take 2 tablets (100 mg total) by mouth once daily.              Indwelling Lines/Drains at time of discharge:   Lines/Drains/Airways       None                   Time spent on the discharge of patient: 35 minutes         HUONG Gottlieb  Department of Hospital Medicine  [facility] - Telemetry ([facility])

## 2025-05-30 NOTE — ASSESSMENT & PLAN NOTE
-Due to medication non-compliance  -ARB initiated  -Diurese   -Will plan to add BB tomorrow  -TTE pending    5/28/2025  -BP improved  -Continue ARB  -Coreg 3.125 mg BID added  -TTE with EF of 45%  -MPI stress test pending    5/29/2025  Continue with current meds     5/30/2025  Continue with current regimen

## 2025-05-30 NOTE — ASSESSMENT & PLAN NOTE
Patient has a current diagnosis of hypertensive urgency (without evidence of end organ damage) which is controlled.  Latest blood pressure and vitals reviewed-   Temp:  [97.3 °F (36.3 °C)-98.5 °F (36.9 °C)]   Pulse:  [62-74]   Resp:  [17-21]   BP: (134-180)/()   SpO2:  [94 %-97 %] .   Patient currently off IV antihypertensives.   Home meds for hypertension were reviewed and noted below.   Hypertension Medications              amLODIPine (NORVASC) 10 MG tablet Take 1 tablet (10 mg total) by mouth once daily.    hydroCHLOROthiazide (HYDRODIURIL) 25 MG tablet Take 1 tablet (25 mg total) by mouth once daily.    losartan (COZAAR) 50 MG tablet Take 2 tablets (100 mg total) by mouth once daily.            Medication adjustment for hospital antihypertensives is as follows- resume HTN meds and utilize prn hydralazine     Will aim for controlled BP reduction by medications noted above. Monitor and mitigate end organ damage as indicated.

## 2025-05-30 NOTE — SUBJECTIVE & OBJECTIVE
Review of Systems   Cardiovascular:  Negative for chest pain, dyspnea on exertion, leg swelling, near-syncope, orthopnea and syncope.   Respiratory:  Negative for cough, shortness of breath and wheezing.    Neurological:  Negative for dizziness and light-headedness.     Objective:     Vital Signs (Most Recent):  Temp: 98.3 °F (36.8 °C) (05/30/25 1213)  Pulse: 71 (05/30/25 1213)  Resp: 18 (05/30/25 1213)  BP: (!) 151/85 (05/30/25 1213)  SpO2: 95 % (05/30/25 1213) Vital Signs (24h Range):  Temp:  [97.3 °F (36.3 °C)-98.5 °F (36.9 °C)] 98.3 °F (36.8 °C)  Pulse:  [62-74] 71  Resp:  [17-21] 18  SpO2:  [94 %-97 %] 95 %  BP: (134-180)/() 151/85     Weight: 94.3 kg (208 lb)  Body mass index is 34.61 kg/m².     SpO2: 95 %         Intake/Output Summary (Last 24 hours) at 5/30/2025 1317  Last data filed at 5/30/2025 0041  Gross per 24 hour   Intake --   Output 500 ml   Net -500 ml       Lines/Drains/Airways       Peripheral Intravenous Line  Duration                  Peripheral IV - Single Lumen 05/27/25 0205 18 G Anterior;Left Forearm 3 days                       Physical Exam  Vitals and nursing note reviewed.   Constitutional:       Appearance: Normal appearance. He is obese.   HENT:      Head: Normocephalic and atraumatic.      Nose: Nose normal.   Eyes:      Extraocular Movements: Extraocular movements intact.   Cardiovascular:      Rate and Rhythm: Normal rate.   Pulmonary:      Effort: Pulmonary effort is normal.   Abdominal:      Palpations: Abdomen is soft.   Musculoskeletal:         General: Normal range of motion.      Cervical back: Normal range of motion and neck supple.      Right lower leg: No edema.      Left lower leg: No edema.      Comments: Left arm access site remains CDI    Skin:     General: Skin is warm and dry.   Neurological:      Mental Status: He is alert and oriented to person, place, and time. Mental status is at baseline.   Psychiatric:         Mood and Affect: Mood normal.          "Behavior: Behavior normal.            Significant Labs: BMP:   Recent Labs   Lab 05/29/25  0436 05/30/25  0406   GLU 91 93    140   K 3.3* 3.8    107   CO2 25 24   BUN 26* 22*   CREATININE 1.2 1.1   CALCIUM 8.9 8.5*   , CMP   Recent Labs   Lab 05/29/25  0436 05/30/25  0406    140   K 3.3* 3.8    107   CO2 25 24   GLU 91 93   BUN 26* 22*   CREATININE 1.2 1.1   CALCIUM 8.9 8.5*   ANIONGAP 12 9   , CBC   Recent Labs   Lab 05/30/25  0406   WBC 6.82   HGB 15.5   HCT 45.7      , Lipid Panel No results for input(s): "CHOL", "HDL", "LDLCALC", "TRIG", "CHOLHDL" in the last 48 hours., and Troponin No results for input(s): "TROPONINIHS" in the last 48 hours.    Significant Imaging: Echocardiogram: Transthoracic echo (TTE) complete (Cupid Only):   Results for orders placed or performed during the hospital encounter of 05/27/25   Echo   Result Value Ref Range    BSA 2.14 m2    LA WIDTH 4.2 cm    RA Width 4.5 cm    A4C EF 43 %    LVOT stroke volume 93.4 cm3    LVIDd 5.8 3.5 - 6.0 cm    LV Systolic Volume 113 mL    LV Systolic Volume Index 54.9 mL/m2    LVIDs 4.9 (A) 2.1 - 4.0 cm    LV ESV A2C 114.04 mL    LV Diastolic Volume 167 mL    LV ESV A4C 67.54 mL    LV Diastolic Volume Index 81.07 mL/m2    LV EDV A4C 134.14 mL    Left Ventricular End Systolic Volume by Teichholz Method 113.29 mL    Left Ventricular End Diastolic Volume by Teichholz Method 166.68 mL    IVS 1.3 (A) 0.6 - 1.1 cm    LVOT diameter 2.3 cm    LVOT area 4.2 cm2    FS 15.5 (A) 28 - 44 %    Left Ventricle Relative Wall Thickness 0.62 cm    PW 1.8 (A) 0.6 - 1.1 cm    LV mass 424.8 g    LV Mass Index 206.2 g/m2    MV Peak E Keon 1.00 m/s    TDI LATERAL 0.03 m/s    TDI SEPTAL 0.05 m/s    E/E' ratio 25 m/s    MV Peak A Keon 1.14 m/s    TR Max Keon 2.8 m/s    E/A ratio 0.88     IVRT 86 msec    E wave deceleration time 142 msec    LV SEPTAL E/E' RATIO 20.0 m/s    JOSH 53 mL/m2    LV LATERAL E/E' RATIO 33.3 m/s    LA Vol 108 cm3    LVOT peak " yandel 1.3 m/s    Left Ventricular Outflow Tract Mean Velocity 0.93 cm/s    Left Ventricular Outflow Tract Mean Gradient 3.84 mmHg    RV- caraballo basal diam 3.9 cm    RVOT peak VTI 14.7 cm    TAPSE 2.9 cm    RV/LV Ratio 0.67 cm    LA size 4.3 cm    Left Atrium Minor Axis 6.9 cm    Left Atrium Major Axis 7.2 cm    LA Vol (MOD) 88 mL    JOSH (MOD) 43 mL/m2    RA Major Axis 6.52 cm    AV regurgitation pressure 1/2 time 454 ms    AR Max Yandel 5.37 m/s    AV mean gradient 11 mmHg    AV peak gradient 16 mmHg    Ao peak yandel 2.0 m/s    Ao VTI 35.1 cm    LVOT peak VTI 22.5 cm    AV valve area 2.7 cm²    AV Velocity Ratio 0.65     AV index (prosthetic) 0.64     GODWIN by Velocity Ratio 2.7 cm²    MV mean gradient 4 mmHg    MV peak gradient 8 mmHg    MV stenosis pressure 1/2 time 42.72 ms    MV valve area p 1/2 method 5.15 cm2    MV valve area by continuity eq 3.27 cm2    MV VTI 28.6 cm    Triscuspid Valve Regurgitation Peak Gradient 30 mmHg    PV mean gradient 2 mmHg    PV PEAK VELOCITY 1.49 m/s    PV peak gradient 9 mmHg    Pulmonary Valve Mean Velocity 0.92 m/s    RVOT peak yandel 0.90 m/s    Ao root annulus 4.0 cm    STJ 2.6 cm    Ascending aorta 3.9 cm    ASI 1.9 cm/m2    IVC diameter 1.89 cm    Mean e' 0.04 m/s    ZLVIDS 1.99     ZLVIDD -0.70     LA area A4C 23.40 cm2    LA area A2C 29.94 cm2    EF 45 %    TV resting pulmonary artery pressure 34 mmHg    RV TB RVSP 6 mmHg    Est. RA pres 3 mmHg    Narrative      Left Ventricle: The left ventricle is normal in size. Mildly increased   wall thickness. There is concentric hypertrophy. There is mildly reduced   systolic function with a visually estimated ejection fraction of 40 - 50%.   Ejection fraction is approximately 45%. Grade II diastolic dysfunction.    Right Ventricle: The right ventricle is normal in size Wall thickness   is normal. Systolic function is normal.    Left Atrium: The left atrium is severely dilated    Aortic Valve: There is mild aortic regurgitation.    Pulmonary  Artery: The estimated pulmonary artery systolic pressure is   34 mmHg.    IVC/SVC: Normal venous pressure at 3 mmHg.     , EKG: reviewed, Stress Test: reviewed, and X-Ray: CXR: X-Ray Chest 1 View (CXR): No results found for this visit on 05/27/25.

## 2025-05-30 NOTE — PLAN OF CARE
A254/A254 JOSE J  Henry Harmon is a 40 y.o.male admitted on 5/27/2025 for CHF exacerbation   Code Status: Full Code MRN: [id number]   Review of patient's allergies indicates:   Allergen Reactions    Penicillins Rash     Past Medical History:   Diagnosis Date    Hypertension       PRN meds    0.9% NaCl, , Continuous PRN  acetaminophen, 650 mg, Q8H PRN  acetaminophen, 650 mg, Q4H PRN  labetaloL, 10 mg, Q6H PRN  nitroGLYCERIN, 0.4 mg, Q5 Min PRN  ondansetron, 8 mg, Q8H PRN  ondansetron, 8 mg, Q8H PRN  polyethylene glycol, 17 g, Daily PRN  promethazine, 25 mg, Q6H PRN  sodium chloride 0.9%, 10 mL, PRN      Chart check completed. Will continue plan of care.      Orientation: oriented x 4  Trini Coma Scale Score: 15     Lead Monitored: Lead II Rhythm: normal sinus rhythm    Cardiac/Telemetry Box Number: [id number]  VTE Core Measure: Pharmacological prophylaxis initiated/maintained Last Bowel Movement: 05/28/25  Diet Heart Healthy     Fredi Score: 22  Fall Risk Score: 9  Accucheck [x]   Freq? achs     Lines/Drains/Airways       Peripheral Intravenous Line  Duration                  Peripheral IV - Single Lumen 05/27/25 0205 18 G Anterior;Left Forearm 3 days

## 2025-05-30 NOTE — ASSESSMENT & PLAN NOTE
-Presents with decompensated CHF in setting of uncontrolled HTN  -Continue IV diuresis  -ARB added  -Will plan to add Coreg tmw  -TTE pending  -Strict I's/O's  -Counseled on low salt diet    5/28/2025  -Much improved  -Will consider po Lasix tmw  -TTE with EF of 45%  -Continue Losartan, Coreg added today  -MPI stress test pending    5/29/2025  Symptoms improved   Continue losartan and coreg  Can add PO lasix 20 mg daily upon DC   Stress test today , follow up  results     5/30/2025  Continue with current antihypertensives   Discussed compliance with meds and dietary restrictions

## 2025-05-30 NOTE — PROGRESS NOTES
[facility] - Telemetry ([facility])  Cardiology  Progress Note    Patient Name: Henry Harmon  MRN: [id number]  Admission Date: 5/27/2025  Hospital Length of Stay: 3 days  Code Status: Full Code   Attending Physician: Spenser Lyons MD   Primary Care Physician: Catie, Primary Doctor  Expected Discharge Date: 5/31/2025  Principal Problem:CHF exacerbation    Subjective:   HPI:  Mr. Harmon is a 40 year old male patient whose current medical conditions include HTN who presented to [facility] ED overnight due to increased SOB over the past 1-2 days. Associated symptoms included left-sided chest pressure, orthopnea, non-productive cough, and PND. He denied any associated nausea, BLE edema, vomiting, palpitations, near syncope, or syncope. Reported he had been off of BP medications for past 6 months-1 year. Initial workup in ED revealed uncontrolled BP (233/149), BNP of 236, and troponin of 0.110. CXR with pulmonary edema. Patient subsequently given IV Lasix and IV hydralazine and admitted for further evaluation and treatment. Cardiology consulted to assist with management. Patient seen and examined today, resting in bed. Feeling better, less SOB. Excellent diuresis. CP free. No piror history of CHF. Denies familial history of CAD. Chart reviewed. Troponin 0.110>0.143. TTE pending. EKG with LVH.     Hospital Course:   5/28/2025-Patient seen and examined today, resting in bed. Feels much better. SOB resolved, diuresed over 4L. Did report some mild LH/dizziness with ambulation. No CP. BP improved. TTE with EF of 45%, DD. MPI stress test ordered.    5/29/2025-Pt seen and examined today. Labs reviewed. Stress test performed today and awaiting final read. Discussed if normal he should be good for DC. Discussed importance of med compliance and diet restrictions. Denies any symptoms at this time.     5/30/2025- Pt seen and examined today. LHC with normal coronary arteries and EF approx 40%. Sitting in bed with no acute distress. No  acute events overnight. Medication  adjusted yesterday post cath. Should be discahrged today. Discussed importance on medications on lifestyle modifications.         Review of Systems   Cardiovascular:  Negative for chest pain, dyspnea on exertion, leg swelling, near-syncope, orthopnea and syncope.   Respiratory:  Negative for cough, shortness of breath and wheezing.    Neurological:  Negative for dizziness and light-headedness.     Objective:     Vital Signs (Most Recent):  Temp: 98.3 °F (36.8 °C) (05/30/25 1213)  Pulse: 71 (05/30/25 1213)  Resp: 18 (05/30/25 1213)  BP: (!) 151/85 (05/30/25 1213)  SpO2: 95 % (05/30/25 1213) Vital Signs (24h Range):  Temp:  [97.3 °F (36.3 °C)-98.5 °F (36.9 °C)] 98.3 °F (36.8 °C)  Pulse:  [62-74] 71  Resp:  [17-21] 18  SpO2:  [94 %-97 %] 95 %  BP: (134-180)/() 151/85     Weight: 94.3 kg (208 lb)  Body mass index is 34.61 kg/m².     SpO2: 95 %         Intake/Output Summary (Last 24 hours) at 5/30/2025 1317  Last data filed at 5/30/2025 0041  Gross per 24 hour   Intake --   Output 500 ml   Net -500 ml       Lines/Drains/Airways       Peripheral Intravenous Line  Duration                  Peripheral IV - Single Lumen 05/27/25 0205 18 G Anterior;Left Forearm 3 days                       Physical Exam  Vitals and nursing note reviewed.   Constitutional:       Appearance: Normal appearance. He is obese.   HENT:      Head: Normocephalic and atraumatic.      Nose: Nose normal.   Eyes:      Extraocular Movements: Extraocular movements intact.   Cardiovascular:      Rate and Rhythm: Normal rate.   Pulmonary:      Effort: Pulmonary effort is normal.   Abdominal:      Palpations: Abdomen is soft.   Musculoskeletal:         General: Normal range of motion.      Cervical back: Normal range of motion and neck supple.      Right lower leg: No edema.      Left lower leg: No edema.      Comments: Left arm access site remains CDI    Skin:     General: Skin is warm and dry.   Neurological:       "Mental Status: He is alert and oriented to person, place, and time. Mental status is at baseline.   Psychiatric:         Mood and Affect: Mood normal.         Behavior: Behavior normal.            Significant Labs: BMP:   Recent Labs   Lab 05/29/25  0436 05/30/25  0406   GLU 91 93    140   K 3.3* 3.8    107   CO2 25 24   BUN 26* 22*   CREATININE 1.2 1.1   CALCIUM 8.9 8.5*   , CMP   Recent Labs   Lab 05/29/25  0436 05/30/25  0406    140   K 3.3* 3.8    107   CO2 25 24   GLU 91 93   BUN 26* 22*   CREATININE 1.2 1.1   CALCIUM 8.9 8.5*   ANIONGAP 12 9   , CBC   Recent Labs   Lab 05/30/25  0406   WBC 6.82   HGB 15.5   HCT 45.7      , Lipid Panel No results for input(s): "CHOL", "HDL", "LDLCALC", "TRIG", "CHOLHDL" in the last 48 hours., and Troponin No results for input(s): "TROPONINIHS" in the last 48 hours.    Significant Imaging: Echocardiogram: Transthoracic echo (TTE) complete (Cupid Only):   Results for orders placed or performed during the hospital encounter of 05/27/25   Echo   Result Value Ref Range    BSA 2.14 m2    LA WIDTH 4.2 cm    RA Width 4.5 cm    A4C EF 43 %    LVOT stroke volume 93.4 cm3    LVIDd 5.8 3.5 - 6.0 cm    LV Systolic Volume 113 mL    LV Systolic Volume Index 54.9 mL/m2    LVIDs 4.9 (A) 2.1 - 4.0 cm    LV ESV A2C 114.04 mL    LV Diastolic Volume 167 mL    LV ESV A4C 67.54 mL    LV Diastolic Volume Index 81.07 mL/m2    LV EDV A4C 134.14 mL    Left Ventricular End Systolic Volume by Teichholz Method 113.29 mL    Left Ventricular End Diastolic Volume by Teichholz Method 166.68 mL    IVS 1.3 (A) 0.6 - 1.1 cm    LVOT diameter 2.3 cm    LVOT area 4.2 cm2    FS 15.5 (A) 28 - 44 %    Left Ventricle Relative Wall Thickness 0.62 cm    PW 1.8 (A) 0.6 - 1.1 cm    LV mass 424.8 g    LV Mass Index 206.2 g/m2    MV Peak E Keon 1.00 m/s    TDI LATERAL 0.03 m/s    TDI SEPTAL 0.05 m/s    E/E' ratio 25 m/s    MV Peak A Keon 1.14 m/s    TR Max Keon 2.8 m/s    E/A ratio 0.88     IVRT 86 " msec    E wave deceleration time 142 msec    LV SEPTAL E/E' RATIO 20.0 m/s    JOSH 53 mL/m2    LV LATERAL E/E' RATIO 33.3 m/s    LA Vol 108 cm3    LVOT peak yandel 1.3 m/s    Left Ventricular Outflow Tract Mean Velocity 0.93 cm/s    Left Ventricular Outflow Tract Mean Gradient 3.84 mmHg    RV- caraballo basal diam 3.9 cm    RVOT peak VTI 14.7 cm    TAPSE 2.9 cm    RV/LV Ratio 0.67 cm    LA size 4.3 cm    Left Atrium Minor Axis 6.9 cm    Left Atrium Major Axis 7.2 cm    LA Vol (MOD) 88 mL    JOSH (MOD) 43 mL/m2    RA Major Axis 6.52 cm    AV regurgitation pressure 1/2 time 454 ms    AR Max Yandel 5.37 m/s    AV mean gradient 11 mmHg    AV peak gradient 16 mmHg    Ao peak yandel 2.0 m/s    Ao VTI 35.1 cm    LVOT peak VTI 22.5 cm    AV valve area 2.7 cm²    AV Velocity Ratio 0.65     AV index (prosthetic) 0.64     GODWIN by Velocity Ratio 2.7 cm²    MV mean gradient 4 mmHg    MV peak gradient 8 mmHg    MV stenosis pressure 1/2 time 42.72 ms    MV valve area p 1/2 method 5.15 cm2    MV valve area by continuity eq 3.27 cm2    MV VTI 28.6 cm    Triscuspid Valve Regurgitation Peak Gradient 30 mmHg    PV mean gradient 2 mmHg    PV PEAK VELOCITY 1.49 m/s    PV peak gradient 9 mmHg    Pulmonary Valve Mean Velocity 0.92 m/s    RVOT peak yandel 0.90 m/s    Ao root annulus 4.0 cm    STJ 2.6 cm    Ascending aorta 3.9 cm    ASI 1.9 cm/m2    IVC diameter 1.89 cm    Mean e' 0.04 m/s    ZLVIDS 1.99     ZLVIDD -0.70     LA area A4C 23.40 cm2    LA area A2C 29.94 cm2    EF 45 %    TV resting pulmonary artery pressure 34 mmHg    RV TB RVSP 6 mmHg    Est. RA pres 3 mmHg    Narrative      Left Ventricle: The left ventricle is normal in size. Mildly increased   wall thickness. There is concentric hypertrophy. There is mildly reduced   systolic function with a visually estimated ejection fraction of 40 - 50%.   Ejection fraction is approximately 45%. Grade II diastolic dysfunction.    Right Ventricle: The right ventricle is normal in size Wall thickness   is  normal. Systolic function is normal.    Left Atrium: The left atrium is severely dilated    Aortic Valve: There is mild aortic regurgitation.    Pulmonary Artery: The estimated pulmonary artery systolic pressure is   34 mmHg.    IVC/SVC: Normal venous pressure at 3 mmHg.     , EKG: reviewed, Stress Test: reviewed, and X-Ray: CXR: X-Ray Chest 1 View (CXR): No results found for this visit on 05/27/25.  Assessment and Plan:       * CHF exacerbation  -Presents with decompensated CHF in setting of uncontrolled HTN  -Continue IV diuresis  -ARB added  -Will plan to add Coreg tmw  -TTE pending  -Strict I's/O's  -Counseled on low salt diet    5/28/2025  -Much improved  -Will consider po Lasix tmw  -TTE with EF of 45%  -Continue Losartan, Coreg added today  -MPI stress test pending    5/29/2025  Symptoms improved   Continue losartan and coreg  Can add PO lasix 20 mg daily upon DC   Stress test today , follow up  results     5/30/2025  Continue with current antihypertensives   Discussed compliance with meds and dietary restrictions     Hypertensive urgency  -Due to medication non-compliance  -ARB initiated  -Diurese   -Will plan to add BB tomorrow  -TTE pending    5/28/2025  -BP improved  -Continue ARB  -Coreg 3.125 mg BID added  -TTE with EF of 45%  -MPI stress test pending    5/29/2025  Continue with current meds     5/30/2025  Continue with current regimen     Hypokalemia  -Being repleted    Elevated troponin  -Troponin 0.110>0.143, continue to trend  -Likely due to demand ischemia from uncontrolled HTN/CHF  -ASA  -ARB  -Statin if indicated  -BB tmw  -TTE pending; stress test IP vs OP    5/28/2025  -Flat  -TTE with EF of 45%  -Continue ASA, ARB, BB  -MPI stress test pending    5/29/2025  See above   Stress test today   Continue with ASA, ARB, BB     5/30/2025  Normal coronary arteries   Continue with antihypertensive regimen           VTE Risk Mitigation (From admission, onward)           Ordered     enoxaparin injection 40  mg  Daily         05/27/25 0449     IP VTE HIGH RISK PATIENT  Once         05/27/25 0449     Place sequential compression device  Until discontinued         05/27/25 0449                    Haris Jade PA-C  Cardiology  '[place] - Telemetry ([facility])

## 2025-05-30 NOTE — PLAN OF CARE
Problem: Adult Inpatient Plan of Care  Goal: Plan of Care Review  Outcome: Adequate for Care Transition  Goal: Patient-Specific Goal (Individualized)  Outcome: Adequate for Care Transition  Goal: Absence of Hospital-Acquired Illness or Injury  Outcome: Adequate for Care Transition  Goal: Optimal Comfort and Wellbeing  Outcome: Adequate for Care Transition  Goal: Readiness for Transition of Care  Outcome: Adequate for Care Transition     Problem: Heart Failure  Goal: Optimal Coping  Outcome: Adequate for Care Transition  Goal: Optimal Cardiac Output  Outcome: Adequate for Care Transition  Goal: Stable Heart Rate and Rhythm  Outcome: Adequate for Care Transition  Goal: Optimal Functional Ability  Outcome: Adequate for Care Transition  Goal: Fluid and Electrolyte Balance  Outcome: Adequate for Care Transition  Goal: Improved Oral Intake  Outcome: Adequate for Care Transition  Goal: Effective Oxygenation and Ventilation  Outcome: Adequate for Care Transition  Goal: Effective Breathing Pattern During Sleep  Outcome: Adequate for Care Transition     Problem: Fall Injury Risk  Goal: Absence of Fall and Fall-Related Injury  Outcome: Adequate for Care Transition

## 2025-05-30 NOTE — ASSESSMENT & PLAN NOTE
Patient's most recent potassium results are listed below.   Recent Labs     05/28/25  0422 05/29/25  0436 05/30/25  0406   K 2.9* 3.3* 3.8     Plan  - continue on potassium

## 2025-05-31 NOTE — PLAN OF CARE
A254/A254 JOSE J  Henry Harmon is a 40 y.o.male admitted on 5/27/2025 for CHF exacerbation   Code Status: Prior MRN: [id number]   Review of patient's allergies indicates:   Allergen Reactions    Penicillins Rash     Past Medical History:   Diagnosis Date    Hypertension       PRN meds      AVS Discharge instructions received and reviewed with pt at bedside.  Pt voiced understanding and all questions answered to satisfaction.  Stressed importance to making and keeping all follow up appointments.  Medications at bedside and reviewed with pt.  Tele monitor removed and brought to monitor tech.  IV d/c'd with tip intact, pressure dressing applied.  Pt will call when ready to be transported to front of hospital via w/c to be discharged home.      Orientation: oriented x 4  Trini Coma Scale Score: 15     Lead Monitored: Lead II Rhythm: normal sinus rhythm    Cardiac/Telemetry Box Number: [id number]  VTE Core Measure: Pharmacological prophylaxis initiated/maintained Last Bowel Movement: 05/30/25  Diet Cardiac     Fredi Score: 22  Fall Risk Score: 9  Accucheck []   Freq?      Lines/Drains/Airways       None

## 2025-06-01 LAB
BACTERIA BLD CULT: NORMAL
BACTERIA BLD CULT: NORMAL

## 2025-06-02 ENCOUNTER — TELEPHONE (OUTPATIENT)
Dept: CARDIOLOGY | Facility: CLINIC | Age: 40
End: 2025-06-02
Payer: MEDICAID

## 2025-06-05 ENCOUNTER — OFFICE VISIT (OUTPATIENT)
Dept: CARDIOLOGY | Facility: CLINIC | Age: 40
End: 2025-06-05

## 2025-06-05 VITALS
OXYGEN SATURATION: 95 % | DIASTOLIC BLOOD PRESSURE: 106 MMHG | HEIGHT: 65 IN | HEART RATE: 67 BPM | SYSTOLIC BLOOD PRESSURE: 158 MMHG | BODY MASS INDEX: 34.63 KG/M2 | WEIGHT: 207.88 LBS

## 2025-06-05 DIAGNOSIS — I10 HYPERTENSION, UNSPECIFIED TYPE: Primary | ICD-10-CM

## 2025-06-05 DIAGNOSIS — E78.5 HYPERLIPIDEMIA, UNSPECIFIED HYPERLIPIDEMIA TYPE: ICD-10-CM

## 2025-06-05 DIAGNOSIS — I50.40 COMBINED SYSTOLIC AND DIASTOLIC CONGESTIVE HEART FAILURE, UNSPECIFIED HF CHRONICITY: ICD-10-CM

## 2025-06-05 PROCEDURE — 99999 PR PBB SHADOW E&M-EST. PATIENT-LVL IV: CPT | Mod: PBBFAC,,,

## 2025-06-05 RX ORDER — CLONIDINE HYDROCHLORIDE 0.1 MG/1
0.1 TABLET ORAL
Status: COMPLETED | OUTPATIENT
Start: 2025-06-05 | End: 2025-06-05

## 2025-06-05 RX ORDER — FUROSEMIDE 40 MG/1
20 TABLET ORAL DAILY
Qty: 45 TABLET | Refills: 3 | Status: SHIPPED | OUTPATIENT
Start: 2025-06-05 | End: 2026-05-31

## 2025-06-05 RX ORDER — LOSARTAN POTASSIUM 50 MG/1
100 TABLET ORAL DAILY
Qty: 180 TABLET | Refills: 3 | Status: SHIPPED | OUTPATIENT
Start: 2025-06-05 | End: 2026-06-05

## 2025-06-05 RX ORDER — CARVEDILOL 6.25 MG/1
6.25 TABLET ORAL 2 TIMES DAILY
Qty: 120 TABLET | Refills: 3 | Status: SHIPPED | OUTPATIENT
Start: 2025-06-05 | End: 2026-01-31

## 2025-06-05 RX ORDER — POTASSIUM CHLORIDE 20 MEQ/1
40 TABLET, EXTENDED RELEASE ORAL DAILY
Qty: 180 TABLET | Refills: 3 | Status: SHIPPED | OUTPATIENT
Start: 2025-06-05 | End: 2026-05-31

## 2025-06-05 RX ORDER — ATORVASTATIN CALCIUM 40 MG/1
40 TABLET, FILM COATED ORAL DAILY
Qty: 90 TABLET | Refills: 3 | Status: SHIPPED | OUTPATIENT
Start: 2025-06-05 | End: 2026-05-31

## 2025-06-05 RX ADMIN — CLONIDINE HYDROCHLORIDE 0.1 MG: 0.1 TABLET ORAL at 10:06

## 2025-06-10 NOTE — PHYSICIAN QUERY
Please specify the diagnosis associated with the clinical findings.  Acute diastolic heart failure (HFpEF)

## 2025-06-16 ENCOUNTER — LAB VISIT (OUTPATIENT)
Dept: LAB | Facility: HOSPITAL | Age: 40
End: 2025-06-16
Payer: MEDICAID

## 2025-06-16 DIAGNOSIS — I10 HYPERTENSION, UNSPECIFIED TYPE: ICD-10-CM

## 2025-06-16 LAB
ANION GAP (OHS): 8 MMOL/L (ref 8–16)
BUN SERPL-MCNC: 12 MG/DL (ref 6–20)
CALCIUM SERPL-MCNC: 9.3 MG/DL (ref 8.7–10.5)
CHLORIDE SERPL-SCNC: 107 MMOL/L (ref 95–110)
CO2 SERPL-SCNC: 26 MMOL/L (ref 23–29)
CREAT SERPL-MCNC: 1.2 MG/DL (ref 0.5–1.4)
GFR SERPLBLD CREATININE-BSD FMLA CKD-EPI: >60 ML/MIN/1.73/M2
GLUCOSE SERPL-MCNC: 99 MG/DL (ref 70–110)
POTASSIUM SERPL-SCNC: 4.4 MMOL/L (ref 3.5–5.1)
SODIUM SERPL-SCNC: 141 MMOL/L (ref 136–145)

## 2025-06-16 PROCEDURE — 36415 COLL VENOUS BLD VENIPUNCTURE: CPT

## 2025-06-16 PROCEDURE — 82310 ASSAY OF CALCIUM: CPT

## 2025-06-17 ENCOUNTER — RESULTS FOLLOW-UP (OUTPATIENT)
Dept: CARDIOLOGY | Facility: CLINIC | Age: 40
End: 2025-06-17

## 2025-06-19 NOTE — PROGRESS NOTES
Subjective:   Patient ID:  Henry Harmon is a 40 y.o. male who presents for evaluation of hypertension. His current medical conditions include hypertension, HLD and CHF.     6/5/2025  Patient presents for follow up after recent hospital admission. He reports that he is feeling much better and has had no further episodes of shortness of breath or chest pain. He does report that his blood pressures have been significantly elevated. He checks them at the store, but not daily. He has had systolic blood pressures as high as 200s.   He is unsure of his medications. Upon calling the pharmacist, the patient was not taking all of the medications that were listed on his hospital discharge. He has only been taking lasix, potassium, coreg, atorvastatin, and nitro prn.   He denies any symptoms today. He denies chest pain, dyspnea, palpitations, pre-syncope, syncope, orthopnea, PND, and leg edema. He is watching the salt in his diet.   His blood pressure reading currently is 160/120.   Patient given 0.1 mg clonidine in clinic at 10:25 am. Patient was laid down on the patient bed.   Blood pressure rechecks:   10:45 am - 170/122  11:00 am - 161/106  11:30 am - 166/112   11:40 - 158/106    6/19/2025  Patient presents for follow up of hypertension. At his last visit, he was started on losartan 100 mg along with his coreg. His blood pressure is still significantly high. His home readings range from 150s-180s systolic with diastolics in the 100s. He is compliant with his medication, but he has been out of his lasix. He is attempting to be compliant with salt intake.   He denies all other symptoms including chest pain, dyspnea, palpitations, pre-syncope, syncope, orthopnea, PND and leg swelling.     Heart cath 05/25 - EF of 40%, diastolic dysfunction, normal coronaries     Past Medical History:   Diagnosis Date    Hypertension        Past Surgical History:   Procedure Laterality Date    CATHETERIZATION OF BOTH LEFT AND RIGHT HEART  N/A 5/29/2025    Procedure: CATHETERIZATION, HEART, BOTH LEFT AND RIGHT;  Surgeon: Casey Leija MD;  Location: [facility] CATH LAB;  Service: Cardiology;  Laterality: N/A;       Social History[1]    No family history on file.    Wt Readings from Last 3 Encounters:   06/20/25 96.7 kg (213 lb 3 oz)   06/05/25 94.3 kg (207 lb 14.3 oz)   05/29/25 94.3 kg (208 lb)     Temp Readings from Last 3 Encounters:   05/30/25 98.2 °F (36.8 °C)   12/23/21 98.3 °F (36.8 °C) (Oral)     BP Readings from Last 3 Encounters:   06/20/25 (!) 160/110   06/05/25 (!) 158/106   05/30/25 (!) 174/113     Pulse Readings from Last 3 Encounters:   06/20/25 66   06/05/25 67   05/30/25 71       Medications Ordered Prior to Encounter[2]    No cardiac monitor results found for the past 12 months    Results for orders placed during the hospital encounter of 05/27/25    Echo    Interpretation Summary    Left Ventricle: The left ventricle is normal in size. Mildly increased wall thickness. There is concentric hypertrophy. There is mildly reduced systolic function with a visually estimated ejection fraction of 40 - 50%. Ejection fraction is approximately 45%. Grade II diastolic dysfunction.    Right Ventricle: The right ventricle is normal in size Wall thickness is normal. Systolic function is normal.    Left Atrium: The left atrium is severely dilated    Aortic Valve: There is mild aortic regurgitation.    Pulmonary Artery: The estimated pulmonary artery systolic pressure is 34 mmHg.    IVC/SVC: Normal venous pressure at 3 mmHg.    Results for orders placed during the hospital encounter of 05/27/25    Nuclear Stress - Cardiology Interpreted    Interpretation Summary    Abnormal myocardial perfusion scan.    There is a moderate to severe intensity, large sized, mostly reversible perfusion abnormality with some fixed areas in the anterior and anterolateral wall(s).    There are no other significant perfusion abnormalities.    The gated perfusion images showed  an ejection fraction of 29% at rest. The gated perfusion images showed an ejection fraction of 32% post stress. Normal ejection fraction is greater than 59%.    There is severe global hypokinesis at rest and post-stress.    LV cavity size is mildly enlarged at rest and mildly enlarged at post-stress.    The ECG portion of the study is negative for ischemia.        Results for orders placed or performed during the hospital encounter of 05/27/25   EKG 12-lead    Collection Time: 05/27/25  1:53 AM   Result Value Ref Range    QRS Duration 98 ms    [facility] QTC Calculation 490 ms    Narrative    Test Reason : R06.02,    Vent. Rate :  97 BPM     Atrial Rate :  97 BPM     P-R Int : 168 ms          QRS Dur :  98 ms      QT Int : 386 ms       P-R-T Axes :  50  93 -13 degrees    QTcB Int : 490 ms    Normal sinus rhythm  Biatrial enlargement  Rightward axis  Biventricular hypertrophy with repolarization abnormality  Prolonged QT  Abnormal ECG  No previous ECGs available  Confirmed by Casey Leija ([id number]) on 5/28/2025 8:41:24 AM    Referred By: AAAREFERRAL SELF           Confirmed By: Casey Leija         Review of Systems   Constitutional: Negative.   HENT: Negative.     Eyes: Negative.    Cardiovascular: Negative.  Negative for chest pain, dyspnea on exertion, leg swelling, near-syncope, orthopnea, palpitations, paroxysmal nocturnal dyspnea and syncope.   Respiratory: Negative.     Skin: Negative.    Musculoskeletal: Negative.    Gastrointestinal: Negative.    Genitourinary: Negative.    Neurological: Negative.    Psychiatric/Behavioral: Negative.           Physical Exam  Vitals and nursing note reviewed.   Constitutional:       Appearance: Normal appearance. He is obese.   HENT:      Head: Normocephalic and atraumatic.   Eyes:      General:         Right eye: No discharge.         Left eye: No discharge.      Pupils: Pupils are equal, round, and reactive to light.   Cardiovascular:      Rate and Rhythm: Normal rate and regular  "rhythm.      Heart sounds: S1 normal and S2 normal. No murmur heard.     No friction rub.   Pulmonary:      Effort: Pulmonary effort is normal. No respiratory distress.      Breath sounds: Normal breath sounds. No rales.   Abdominal:      Palpations: Abdomen is soft.      Tenderness: There is no abdominal tenderness.   Musculoskeletal:      Cervical back: Neck supple.      Right lower leg: No edema.      Left lower leg: No edema.   Skin:     General: Skin is warm and dry.   Neurological:      General: No focal deficit present.      Mental Status: He is alert and oriented to person, place, and time.   Psychiatric:         Mood and Affect: Mood normal.         Behavior: Behavior normal.         Thought Content: Thought content normal.         Lab Results   Component Value Date    CHOL 204 (H) 12/22/2021     Lab Results   Component Value Date    HDL 37 (L) 12/22/2021     Lab Results   Component Value Date    LDLCALC 148.8 12/22/2021     Lab Results   Component Value Date    TRIG 91 12/22/2021     Lab Results   Component Value Date    CHOLHDL 18.1 (L) 12/22/2021       Chemistry        Component Value Date/Time     06/16/2025 0905     12/21/2021 2010    K 4.4 06/16/2025 0905    K 4.0 12/21/2021 2010     06/16/2025 0905     12/21/2021 2010    CO2 26 06/16/2025 0905    CO2 24 12/21/2021 2010    BUN 12 06/16/2025 0905    CREATININE 1.2 06/16/2025 0905    GLU 99 06/16/2025 0905    GLU 91 12/21/2021 2010        Component Value Date/Time    CALCIUM 9.3 06/16/2025 0905    CALCIUM 9.0 12/21/2021 2010    ALKPHOS 87 05/27/2025 0205    ALKPHOS 94 12/21/2021 2010    AST 26 05/27/2025 0205    AST 44 (H) 12/21/2021 2010    ALT 30 05/27/2025 0205    ALT 40 12/21/2021 2010    BILITOT 0.6 05/27/2025 0205    BILITOT 0.4 12/21/2021 2010    ESTGFRAFRICA >60 12/21/2021 2010    EGFRNONAA >60 12/21/2021 2010          Lab Results   Component Value Date    TSH 4.238 (H) 05/27/2025     No results found for: "INR", " ""PROTIME"  Lab Results   Component Value Date    WBC 6.82 05/30/2025    HGB 15.5 05/30/2025    HCT 45.7 05/30/2025    MCV 90 05/30/2025     05/30/2025          Assessment:      1. Combined systolic and diastolic congestive heart failure, unspecified HF chronicity    2. Primary hypertension    3. Mixed hyperlipidemia        Plan:   Combined systolic and diastolic congestive heart failure, unspecified HF chronicity  -     olmesartan (BENICAR) 40 MG tablet; Take 1 tablet (40 mg total) by mouth once daily.  Dispense: 90 tablet; Refill: 3  -     spironolactone (ALDACTONE) 25 MG tablet; Take 1 tablet (25 mg total) by mouth once daily.  Dispense: 90 tablet; Refill: 3  -     Basic metabolic panel; Future; Expected date: 07/04/2025    Primary hypertension    Mixed hyperlipidemia        Stop losartan. Start olmesartan 40 mg once daily.   Continue coreg and lasix - HTN/CHF   Start aldactone 25 mg once daily. Stop potassium supplementation.   Patient to return to ED if continued elevated blood pressures.   Continue atorvastatin - HLD  Low salt diet, exercise as tolerated  BMP in 2 weeks  RTC 3 weeks    Vidhya Hermosillo PA-C  [facility]           [1]   Social History  Tobacco Use    Smoking status: Never    Smokeless tobacco: Never   Vaping Use    Vaping status: Never Used   Substance Use Topics    Alcohol use: Not Currently     Comment: Occasional use    Drug use: Never   [2]   Current Outpatient Medications on File Prior to Visit   Medication Sig Dispense Refill    aspirin (ECOTRIN) 81 MG EC tablet Take 1 tablet (81 mg total) by mouth once daily. 30 tablet 1    atorvastatin (LIPITOR) 40 MG tablet Take 1 tablet (40 mg total) by mouth once daily. 90 tablet 3    carvediloL (COREG) 6.25 MG tablet Take 1 tablet (6.25 mg total) by mouth 2 (two) times daily. 120 tablet 3    nitroGLYCERIN (NITROSTAT) 0.4 MG SL tablet Place 1 tablet (0.4 mg total) under the tongue every 5 (five) minutes as needed for Chest pain. 25 tablet 1 "    [DISCONTINUED] losartan (COZAAR) 50 MG tablet Take 2 tablets (100 mg total) by mouth once daily. 180 tablet 3    [DISCONTINUED] potassium chloride SA (K-DUR,KLOR-CON) 20 MEQ tablet Take 2 tablets (40 mEq total) by mouth once daily. 180 tablet 3    furosemide (LASIX) 40 MG tablet Take ½ tablet (20 mg total) by mouth once daily. 45 tablet 3     No current facility-administered medications on file prior to visit.

## 2025-06-20 ENCOUNTER — OFFICE VISIT (OUTPATIENT)
Dept: CARDIOLOGY | Facility: CLINIC | Age: 40
End: 2025-06-20

## 2025-06-20 VITALS
HEIGHT: 65 IN | SYSTOLIC BLOOD PRESSURE: 160 MMHG | WEIGHT: 213.19 LBS | DIASTOLIC BLOOD PRESSURE: 110 MMHG | HEART RATE: 66 BPM | BODY MASS INDEX: 35.52 KG/M2 | OXYGEN SATURATION: 98 %

## 2025-06-20 DIAGNOSIS — I50.40 COMBINED SYSTOLIC AND DIASTOLIC CONGESTIVE HEART FAILURE, UNSPECIFIED HF CHRONICITY: Primary | ICD-10-CM

## 2025-06-20 DIAGNOSIS — E78.2 MIXED HYPERLIPIDEMIA: ICD-10-CM

## 2025-06-20 DIAGNOSIS — I10 PRIMARY HYPERTENSION: ICD-10-CM

## 2025-06-20 PROCEDURE — 99999 PR PBB SHADOW E&M-EST. PATIENT-LVL III: CPT | Mod: PBBFAC,,,

## 2025-06-20 RX ORDER — SPIRONOLACTONE 25 MG/1
25 TABLET ORAL DAILY
Qty: 90 TABLET | Refills: 3 | Status: SHIPPED | OUTPATIENT
Start: 2025-06-20 | End: 2026-06-20

## 2025-06-20 RX ORDER — OLMESARTAN MEDOXOMIL 40 MG/1
40 TABLET ORAL DAILY
Qty: 90 TABLET | Refills: 3 | Status: SHIPPED | OUTPATIENT
Start: 2025-06-20 | End: 2026-06-20

## 2025-06-20 NOTE — PATIENT INSTRUCTIONS
Stop losartan. Start olmesartan once a day.   Start aldactone (spironolactone) once a day.   Stop potassium.

## 2025-07-08 ENCOUNTER — LAB VISIT (OUTPATIENT)
Dept: LAB | Facility: HOSPITAL | Age: 40
End: 2025-07-08

## 2025-07-08 DIAGNOSIS — I50.40 COMBINED SYSTOLIC AND DIASTOLIC CONGESTIVE HEART FAILURE, UNSPECIFIED HF CHRONICITY: ICD-10-CM

## 2025-07-08 LAB
ANION GAP (OHS): 9 MMOL/L (ref 8–16)
BUN SERPL-MCNC: 19 MG/DL (ref 6–20)
CALCIUM SERPL-MCNC: 8.8 MG/DL (ref 8.7–10.5)
CHLORIDE SERPL-SCNC: 106 MMOL/L (ref 95–110)
CO2 SERPL-SCNC: 25 MMOL/L (ref 23–29)
CREAT SERPL-MCNC: 1.3 MG/DL (ref 0.5–1.4)
GFR SERPLBLD CREATININE-BSD FMLA CKD-EPI: >60 ML/MIN/1.73/M2
GLUCOSE SERPL-MCNC: 95 MG/DL (ref 70–110)
POTASSIUM SERPL-SCNC: 4.6 MMOL/L (ref 3.5–5.1)
SODIUM SERPL-SCNC: 140 MMOL/L (ref 136–145)

## 2025-07-08 PROCEDURE — 36415 COLL VENOUS BLD VENIPUNCTURE: CPT

## 2025-07-08 PROCEDURE — 82374 ASSAY BLOOD CARBON DIOXIDE: CPT

## 2025-07-09 ENCOUNTER — TELEPHONE (OUTPATIENT)
Dept: CARDIOLOGY | Facility: CLINIC | Age: 40
End: 2025-07-09

## 2025-07-09 NOTE — TELEPHONE ENCOUNTER
Contacted PT and reviewed results-Please let patient know that kidney function looks good. How have blood pressures been?     PT stated his BP has been good.    ----- Message from Vidhya Hermosillo PA-C sent at 7/9/2025  8:01 AM CDT -----  Please let patient know that kidney function looks good. How have blood pressures been?  ----- Message -----  From: Lab, Background User  Sent: 7/8/2025   5:29 PM CDT  To: Vidhya Hermosillo PA-C

## 2025-07-11 ENCOUNTER — OFFICE VISIT (OUTPATIENT)
Dept: CARDIOLOGY | Facility: CLINIC | Age: 40
End: 2025-07-11

## 2025-07-11 VITALS
BODY MASS INDEX: 36.29 KG/M2 | DIASTOLIC BLOOD PRESSURE: 80 MMHG | WEIGHT: 217.81 LBS | OXYGEN SATURATION: 96 % | SYSTOLIC BLOOD PRESSURE: 130 MMHG | HEART RATE: 65 BPM | HEIGHT: 65 IN

## 2025-07-11 DIAGNOSIS — I10 PRIMARY HYPERTENSION: Primary | ICD-10-CM

## 2025-07-11 DIAGNOSIS — I50.40 COMBINED SYSTOLIC AND DIASTOLIC CONGESTIVE HEART FAILURE, UNSPECIFIED HF CHRONICITY: ICD-10-CM

## 2025-07-11 DIAGNOSIS — E78.2 MIXED HYPERLIPIDEMIA: ICD-10-CM

## 2025-07-11 PROBLEM — I16.0 HYPERTENSIVE URGENCY: Status: RESOLVED | Noted: 2025-05-27 | Resolved: 2025-07-11

## 2025-07-11 PROCEDURE — 99213 OFFICE O/P EST LOW 20 MIN: CPT | Mod: PBBFAC

## 2025-07-11 PROCEDURE — 99999 PR PBB SHADOW E&M-EST. PATIENT-LVL III: CPT | Mod: PBBFAC,,,

## 2025-07-11 NOTE — PROGRESS NOTES
Subjective:   Patient ID:  Henry Harmon is a 40 y.o. male who presents for evaluation of hypertension. His current medical conditions include hypertension, CHF, and HLD    6/5/2025  Patient presents for follow up after recent hospital admission. He reports that he is feeling much better and has had no further episodes of shortness of breath or chest pain. He does report that his blood pressures have been significantly elevated. He checks them at the store, but not daily. He has had systolic blood pressures as high as 200s.   He is unsure of his medications. Upon calling the pharmacist, the patient was not taking all of the medications that were listed on his hospital discharge. He has only been taking lasix, potassium, coreg, atorvastatin, and nitro prn.   He denies any symptoms today. He denies chest pain, dyspnea, palpitations, pre-syncope, syncope, orthopnea, PND, and leg edema. He is watching the salt in his diet.   His blood pressure reading currently is 160/120.   Patient given 0.1 mg clonidine in clinic at 10:25 am. Patient was laid down on the patient bed.   Blood pressure rechecks:   10:45 am - 170/122  11:00 am - 161/106  11:30 am - 166/112   11:40 - 158/106     6/19/2025  Patient presents for follow up of hypertension. At his last visit, he was started on losartan 100 mg along with his coreg. His blood pressure is still significantly high. His home readings range from 150s-180s systolic with diastolics in the 100s. He is compliant with his medication, but he has been out of his lasix. He is attempting to be compliant with salt intake.   He denies all other symptoms including chest pain, dyspnea, palpitations, pre-syncope, syncope, orthopnea, PND and leg swelling.      Heart cath 05/25 - EF of 40%, diastolic dysfunction, normal coronaries    7/11/25  Patient presents for follow up of hypertension. At his last visit, his losartan was switched to olmesartan and aldactone. His blood pressures at home  have been much better controlled ranging from 110s-120s/80s. He is tolerating the medication well and is compliant with a low salt diet.   He denies all other symptoms including chest pain, dyspnea, palpitations, pre-syncope, syncope, orthopnea, PND and leg swelling.       Past Medical History:   Diagnosis Date    Hypertension        Past Surgical History:   Procedure Laterality Date    CATHETERIZATION OF BOTH LEFT AND RIGHT HEART N/A 5/29/2025    Procedure: CATHETERIZATION, HEART, BOTH LEFT AND RIGHT;  Surgeon: Casey Leija MD;  Location: [facility] CATH LAB;  Service: Cardiology;  Laterality: N/A;       Social History[1]    No family history on file.    Wt Readings from Last 3 Encounters:   06/20/25 96.7 kg (213 lb 3 oz)   06/05/25 94.3 kg (207 lb 14.3 oz)   05/29/25 94.3 kg (208 lb)     Temp Readings from Last 3 Encounters:   05/30/25 98.2 °F (36.8 °C)   12/23/21 98.3 °F (36.8 °C) (Oral)     BP Readings from Last 3 Encounters:   06/20/25 (!) 160/110   06/05/25 (!) 158/106   05/30/25 (!) 174/113     Pulse Readings from Last 3 Encounters:   06/20/25 66   06/05/25 67   05/30/25 71       Medications Ordered Prior to Encounter[2]    No cardiac monitor results found for the past 12 months    Results for orders placed during the hospital encounter of 05/27/25    Echo    Interpretation Summary    Left Ventricle: The left ventricle is normal in size. Mildly increased wall thickness. There is concentric hypertrophy. There is mildly reduced systolic function with a visually estimated ejection fraction of 40 - 50%. Ejection fraction is approximately 45%. Grade II diastolic dysfunction.    Right Ventricle: The right ventricle is normal in size Wall thickness is normal. Systolic function is normal.    Left Atrium: The left atrium is severely dilated    Aortic Valve: There is mild aortic regurgitation.    Pulmonary Artery: The estimated pulmonary artery systolic pressure is 34 mmHg.    IVC/SVC: Normal venous pressure at 3  mmHg.    Results for orders placed during the hospital encounter of 05/27/25    Nuclear Stress - Cardiology Interpreted    Interpretation Summary    Abnormal myocardial perfusion scan.    There is a moderate to severe intensity, large sized, mostly reversible perfusion abnormality with some fixed areas in the anterior and anterolateral wall(s).    There are no other significant perfusion abnormalities.    The gated perfusion images showed an ejection fraction of 29% at rest. The gated perfusion images showed an ejection fraction of 32% post stress. Normal ejection fraction is greater than 59%.    There is severe global hypokinesis at rest and post-stress.    LV cavity size is mildly enlarged at rest and mildly enlarged at post-stress.    The ECG portion of the study is negative for ischemia.        Results for orders placed or performed during the hospital encounter of 05/27/25   EKG 12-lead    Collection Time: 05/27/25  1:53 AM   Result Value Ref Range    QRS Duration 98 ms    [facility] QTC Calculation 490 ms    Narrative    Test Reason : R06.02,    Vent. Rate :  97 BPM     Atrial Rate :  97 BPM     P-R Int : 168 ms          QRS Dur :  98 ms      QT Int : 386 ms       P-R-T Axes :  50  93 -13 degrees    QTcB Int : 490 ms    Normal sinus rhythm  Biatrial enlargement  Rightward axis  Biventricular hypertrophy with repolarization abnormality  Prolonged QT  Abnormal ECG  No previous ECGs available  Confirmed by Casey Leija ([id number]) on 5/28/2025 8:41:24 AM    Referred By: AAAREFERRAL SELF           Confirmed By: Casey Leija         Review of Systems   Constitutional: Negative.   HENT: Negative.     Eyes: Negative.    Cardiovascular: Negative.  Negative for chest pain, dyspnea on exertion, irregular heartbeat, leg swelling, near-syncope, orthopnea, palpitations, paroxysmal nocturnal dyspnea and syncope.   Respiratory: Negative.     Skin: Negative.    Musculoskeletal: Negative.    Gastrointestinal: Negative.    Genitourinary:  Negative.    Neurological: Negative.    Psychiatric/Behavioral: Negative.           Physical Exam  Vitals and nursing note reviewed.   Constitutional:       Appearance: Normal appearance.   HENT:      Head: Normocephalic and atraumatic.   Eyes:      General:         Right eye: No discharge.         Left eye: No discharge.      Pupils: Pupils are equal, round, and reactive to light.   Cardiovascular:      Rate and Rhythm: Normal rate and regular rhythm.      Heart sounds: S1 normal and S2 normal. No murmur heard.     No friction rub.   Pulmonary:      Effort: Pulmonary effort is normal. No respiratory distress.      Breath sounds: Normal breath sounds. No rales.   Abdominal:      Palpations: Abdomen is soft.      Tenderness: There is no abdominal tenderness.   Musculoskeletal:      Cervical back: Neck supple.      Right lower leg: No edema.      Left lower leg: No edema.   Skin:     General: Skin is warm and dry.   Neurological:      General: No focal deficit present.      Mental Status: He is alert and oriented to person, place, and time.   Psychiatric:         Mood and Affect: Mood normal.         Behavior: Behavior normal.         Thought Content: Thought content normal.         Lab Results   Component Value Date    CHOL 204 (H) 12/22/2021     Lab Results   Component Value Date    HDL 37 (L) 12/22/2021     Lab Results   Component Value Date    LDLCALC 148.8 12/22/2021     Lab Results   Component Value Date    TRIG 91 12/22/2021     Lab Results   Component Value Date    CHOLHDL 18.1 (L) 12/22/2021       Chemistry        Component Value Date/Time     07/08/2025 0732     12/21/2021 2010    K 4.6 07/08/2025 0732    K 4.0 12/21/2021 2010     07/08/2025 0732     12/21/2021 2010    CO2 25 07/08/2025 0732    CO2 24 12/21/2021 2010    BUN 19 07/08/2025 0732    CREATININE 1.3 07/08/2025 0732    GLU 95 07/08/2025 0732    GLU 91 12/21/2021 2010        Component Value Date/Time    CALCIUM 8.8 07/08/2025  "0732    CALCIUM 9.0 12/21/2021 2010    ALKPHOS 87 05/27/2025 0205    ALKPHOS 94 12/21/2021 2010    AST 26 05/27/2025 0205    AST 44 (H) 12/21/2021 2010    ALT 30 05/27/2025 0205    ALT 40 12/21/2021 2010    BILITOT 0.6 05/27/2025 0205    BILITOT 0.4 12/21/2021 2010    ESTGFRAFRICA >60 12/21/2021 2010    EGFRNONAA >60 12/21/2021 2010          Lab Results   Component Value Date    TSH 4.238 (H) 05/27/2025     No results found for: "INR", "PROTIME"  Lab Results   Component Value Date    WBC 6.82 05/30/2025    HGB 15.5 05/30/2025    HCT 45.7 05/30/2025    MCV 90 05/30/2025     05/30/2025       Assessment:      1. Primary hypertension    2. Combined systolic and diastolic congestive heart failure, unspecified HF chronicity    3. Mixed hyperlipidemia        Plan:   Primary hypertension    Combined systolic and diastolic congestive heart failure, unspecified HF chronicity    Mixed hyperlipidemia      Continue coreg, olmesartan, aldactone and lasix - HTN/CHF    Continue atorvastatin - HLD  Low salt diet, exercise as tolerated  RTC 4 months with lipid panel     Vidhya Hermosillo PA-C  [facility]         [1]   Social History  Tobacco Use    Smoking status: Never    Smokeless tobacco: Never   Vaping Use    Vaping status: Never Used   Substance Use Topics    Alcohol use: Not Currently     Comment: Occasional use    Drug use: Never   [2]   Current Outpatient Medications on File Prior to Visit   Medication Sig Dispense Refill    atorvastatin (LIPITOR) 40 MG tablet Take 1 tablet (40 mg total) by mouth once daily. 90 tablet 3    carvediloL (COREG) 6.25 MG tablet Take 1 tablet (6.25 mg total) by mouth 2 (two) times daily. 120 tablet 3    furosemide (LASIX) 40 MG tablet Take ½ tablet (20 mg total) by mouth once daily. 45 tablet 3    olmesartan (BENICAR) 40 MG tablet Take 1 tablet (40 mg total) by mouth once daily. 90 tablet 3    spironolactone (ALDACTONE) 25 MG tablet Take 1 tablet (25 mg total) by mouth once daily. 90 " tablet 3    aspirin (ECOTRIN) 81 MG EC tablet Take 1 tablet (81 mg total) by mouth once daily. 30 tablet 1    nitroGLYCERIN (NITROSTAT) 0.4 MG SL tablet Place 1 tablet (0.4 mg total) under the tongue every 5 (five) minutes as needed for Chest pain. 25 tablet 1     No current facility-administered medications on file prior to visit.

## (undated) DEVICE — KIT SITE-RITE NDL GUIDE 21G

## (undated) DEVICE — KIT GLIDESHEATH SLEND 6FR 10CM

## (undated) DEVICE — Device

## (undated) DEVICE — OMNIPAQUE 300MG 150ML VIAL

## (undated) DEVICE — KIT INTRODUCER STIFFEN MICRO

## (undated) DEVICE — GUIDEWIRE WHOLEY HI TORQ 175CM

## (undated) DEVICE — BAND TR COMP DEVICE REG 24CM

## (undated) DEVICE — WIRE GUIDE TEFLON 3CM .035 145

## (undated) DEVICE — CATH PIG145 INFINITI 5X110CM

## (undated) DEVICE — CATH INFINITI MULTIPAK JR4 5FR

## (undated) DEVICE — ANGIOTOUCH KIT

## (undated) DEVICE — KIT MANIFOLD LOW PRESS TUBING

## (undated) DEVICE — GUIDEWIRE SV-5 ST .018IN 300CM

## (undated) DEVICE — CATH JR4 5FR

## (undated) DEVICE — CATH JL4 5FR

## (undated) DEVICE — PACK HEART CATH BR

## (undated) DEVICE — CATH CV QD LUMN 6FRX110CM

## (undated) DEVICE — KIT SYR REUSABLE